# Patient Record
Sex: FEMALE | Race: ASIAN | NOT HISPANIC OR LATINO | Employment: UNEMPLOYED | URBAN - METROPOLITAN AREA
[De-identification: names, ages, dates, MRNs, and addresses within clinical notes are randomized per-mention and may not be internally consistent; named-entity substitution may affect disease eponyms.]

---

## 2022-10-13 NOTE — PROGRESS NOTES
"PRENATAL VISIT   FIRST OBSTETRICAL EXAM - OB   Nou is a new pt to the [facility] CNM's  , professional, among used for the visit    Assessment / Impression   First prenatal visit at 11w0d,  LMP 8/3/22, EDC 5/10/23  28yo    Last pap = never  BMI 26.21  EDPS = 030, \"never\" to #10    Plan:      -IOB labs drawn.  Including 1 hour GCT, hemoglobin electrophoresis, lead.  No GC chlamydia done today.  -Pt is a candidate for drawing lead level per [facility] screening tool--only because patient came from [facility] 2 months ago.   -Patient possibly interested in waterbirth.    -Reviewed prenatal care schedule.   -Optimal nutrition and weight gain discussed. Pregnancy weight gain of 15-25 lbs (BMI 25.0-29.9) encouraged.   -Anticipatory guidance for common pregnancy questions and concerns reviewed.   -Danger s/sx for this trimester reviewed with patient.   -Reviewed genetic screening options with patient, patient Does not elect for first trimester screening. The patient does not elect for quad screening.  Patient will check with her insurance to see what it will cover.  Patient originally said yes she would like the NIPS blood test--but when I mention that she is low risk, and that sometimes there is an out-of-pocket cost, encourage patient to check with her insurance first.  So she will do this and let us know in 1 week if she would like it drawn.  Gave written information regarding this test and she can check with her insurance company.  -Reviewed carrier testing options with patient, patient does not elect for testing or referral to genetic counseling.  -IOB packet given and reviewed with patient.   -CN services and hospital options reviewed; emergency and scheduling phone numbers given to patient.   -Because the patient does not have 1 high risk nor 2 or more moderate risk factors, low-dose aspirin not be initiated. (first preg, ask about SES NV)  -Antepartum VTE risk factors absent.  -Patient is at increased " risk for overt diabetes, so early 1hr GCT will be added to IOB labs today.  -Pt is not a candidate for an antepartum OB consult.    -Rx for all supplements recommended given including PNV, Slow Fe, vit D3, vit C, vit b 12  -Return to clinic 1 week for physical exam, GC chlamydia, Pap ?, check back about NIPS, SES?, safety check    Total time: 60 minutes spent on the date of the encounter doing chart review, review of test results, patient visit and documentation.     Subjective:   Sundar Weaver is a 26 year old G 1 P 0000 here today for her first obstetrical exam at 11 okay 1 SROM w0d. Here with  Bala. This pregnancy is planned. Sundar is a new immigrant--arrived in [place] from [facility] 2 months ago. The patient reports nausea, but no vomiting, fatigue and some mild breast tenderness. She has a certain 8/3/22, predicting an expected date of delivery of Estimated Date of Delivery: 5/10/22. Last period was normal. Her previous three cycles were normal. Her pregnancy is dated by LMP for now, but no FHT's so will defer to dating US if different.     The patient states that she is in a monogamous relationship and states that she is safe (but  always present). Offered GC/CT screening today and patient accepts at NV.  Current symptoms also include: breast tenderness, fatigue and frequent urination.     Risk factors: none Pregnancy Risk Factors:Less than 12th grade education, language barrier (needs [facility] )    The patient has the following high risk factors for preeclampsia:none. The patient has the following moderate risk factors for preeclampsia:first pregnancy. (check at NV for SES)    The patient has the following antepartum risk factors for VTE (two or more risk factors, or 1 * risk factor, places patient at higher risk): none.   Clinical history/risk factors requiring antepartum OB consult: none.     The patient has the following risk factors for overt diabetes: Body mass index greater than or equal to 23  "kg/m2in  Americans. Plus the additional risk factor(s) of: High-risk race/ethnicity (eg, , , ,  American, ).    Social History:   Education level: unknown (recent immigrant from [facility]  Occupation: [employer]   Partners name: Chor ()   ?   OB History   No obstetric history on file.        History:   No past medical history on file.   No past surgical history on file.   No family history on file.       No current outpatient medications on file.      Not on File     The patient's medical, surgical and family histories were reviewed     Pap smear: Last Pap: never had    EPDS score today: 0/30.\"never\" to #10   History of anxiety or depression: no    Review of Systems   General: Fatigue but otherwise denies problem   Eyes: Denies problem   Ears/Nose/Throat: Denies problem   Cardiovascular: Denies problem   Respiratory: Denies problem   Gastrointestinal: Nausea without vomiting, otherwise negative   Genitourinary: Denies any discharge, vaginal bleeding or itchiness or any other problem   Musculoskeletal: Breast tenderness otherwise denies problem   Skin: Denies problem   Neurologic: Denies problem   Psychiatric: Denies problem   Endocrine: Denies problem   Heme/Lymphatic: Denies problem   Allergic/Immunologic: Denies problem         Objective:   Objective  There were no vitals filed for this visit.     Physical Exam:   General Appearance: Alert, cooperative, no distress, appears stated age   HEENT: Normocephalic, without obvious abnormality, atraumatic. Conjunctiva/corneas clear  Lungs:  respirations unlabored   Heart: perfused normally  Breasts:deferred.   Abdomen: Gravid, soft, non-tender,  no masses.   FHT: NH  Pelvic: deferred  Musculoskeletal: Extremities normal, atraumatic, no cyanosis   Neurologic: Alert and oriented x 3. Normal speech   Lab: No results found for any visits on 10/19/22.        Addendum:    Order: [id number]   Status: Final result  "     Visible to patient: No (inaccessible in MyChart)      Dx: Normal first pregnancy confirmed, ant...      0 Result Notes  Details    Reading Physician Reading Date Result Priority   He Delcid MD  [phone] 10/19/2022      Narrative & Impression  EXAM: US OB < 14 WEEKS SINGLE-TRANSABDOMINAL  LOCATION: [facility]  DATE/TIME: 10/19/2022 5:29 PM     INDICATION: early US for dating, no FHT's in clinic  COMPARISON: None.  TECHNIQUE: Transabdominal scans were performed.      FINDINGS:  UTERUS: Single normal appearing intrauterine gestation sac.  CRL: Measures 3.4 cm, equals 10 weeks 3 days.  FETAL HEART RATE: 163 bpm.  AMNIOTIC FLUID: Normal.  PLACENTA: Not yet formed. Small subchorionic hemorrhage measuring 2.1 x 0.9 x 1 cm.     RIGHT OVARY: Normal.  LEFT OVARY: Small corpus luteum cyst.                                                                      IMPRESSION:   1.  Single living intrauterine gestation at 10 weeks 3 days, EDC 5/10/2023.  2.  Small subchorionic hemorrhage.

## 2022-10-19 ENCOUNTER — HOSPITAL ENCOUNTER (OUTPATIENT)
Dept: ULTRASOUND IMAGING | Facility: HOSPITAL | Age: 27
Discharge: HOME | End: 2022-10-19
Attending: MIDWIFE | Admitting: MIDWIFE
Payer: COMMERCIAL

## 2022-10-19 ENCOUNTER — PRENATAL OFFICE VISIT (OUTPATIENT)
Dept: MIDWIFE SERVICES | Facility: CLINIC | Age: 27
End: 2022-10-19
Payer: COMMERCIAL

## 2022-10-19 VITALS
BODY MASS INDEX: 25.95 KG/M2 | HEIGHT: 62 IN | OXYGEN SATURATION: 99 % | WEIGHT: 141 LBS | DIASTOLIC BLOOD PRESSURE: 62 MMHG | SYSTOLIC BLOOD PRESSURE: 108 MMHG | HEART RATE: 92 BPM

## 2022-10-19 DIAGNOSIS — Z34.00 NORMAL FIRST PREGNANCY CONFIRMED, ANTEPARTUM: ICD-10-CM

## 2022-10-19 DIAGNOSIS — Z34.00 SUPERVISION OF NORMAL FIRST PREGNANCY, ANTEPARTUM: ICD-10-CM

## 2022-10-19 DIAGNOSIS — Z34.00 NORMAL FIRST PREGNANCY CONFIRMED, ANTEPARTUM: Primary | ICD-10-CM

## 2022-10-19 LAB
ABO/RH(D): NORMAL
ANTIBODY SCREEN: NEGATIVE
ERYTHROCYTE [DISTWIDTH] IN BLOOD BY AUTOMATED COUNT: 12.9 % (ref 10–15)
GLUCOSE 1H P 50 G GLC PO SERPL-MCNC: 124 MG/DL (ref 70–129)
HCT VFR BLD AUTO: 34.3 % (ref 35–47)
HGB BLD-MCNC: 11.7 G/DL (ref 11.7–15.7)
MCH RBC QN AUTO: 29.2 PG (ref 26.5–33)
MCHC RBC AUTO-ENTMCNC: 34.1 G/DL (ref 31.5–36.5)
MCV RBC AUTO: 86 FL (ref 78–100)
PLATELET # BLD AUTO: 251 10E3/UL (ref 150–450)
RBC # BLD AUTO: 4.01 10E6/UL (ref 3.8–5.2)
SPECIMEN EXPIRATION DATE: NORMAL
SPECIMEN EXPIRATION DATE: NORMAL
WBC # BLD AUTO: 9.9 10E3/UL (ref 4–11)

## 2022-10-19 PROCEDURE — 82306 VITAMIN D 25 HYDROXY: CPT | Performed by: MIDWIFE

## 2022-10-19 PROCEDURE — 86850 RBC ANTIBODY SCREEN: CPT | Performed by: MIDWIFE

## 2022-10-19 PROCEDURE — 86803 HEPATITIS C AB TEST: CPT | Performed by: MIDWIFE

## 2022-10-19 PROCEDURE — 85660 RBC SICKLE CELL TEST: CPT | Mod: 90 | Performed by: MIDWIFE

## 2022-10-19 PROCEDURE — 86762 RUBELLA ANTIBODY: CPT | Performed by: MIDWIFE

## 2022-10-19 PROCEDURE — 83021 HEMOGLOBIN CHROMOTOGRAPHY: CPT | Mod: 90 | Performed by: MIDWIFE

## 2022-10-19 PROCEDURE — 87340 HEPATITIS B SURFACE AG IA: CPT | Performed by: MIDWIFE

## 2022-10-19 PROCEDURE — 86900 BLOOD TYPING SEROLOGIC ABO: CPT | Performed by: MIDWIFE

## 2022-10-19 PROCEDURE — 85027 COMPLETE CBC AUTOMATED: CPT | Performed by: MIDWIFE

## 2022-10-19 PROCEDURE — 99205 OFFICE O/P NEW HI 60 MIN: CPT | Performed by: MIDWIFE

## 2022-10-19 PROCEDURE — 83655 ASSAY OF LEAD: CPT | Mod: 90 | Performed by: MIDWIFE

## 2022-10-19 PROCEDURE — 99000 SPECIMEN HANDLING OFFICE-LAB: CPT | Performed by: MIDWIFE

## 2022-10-19 PROCEDURE — 86901 BLOOD TYPING SEROLOGIC RH(D): CPT | Performed by: MIDWIFE

## 2022-10-19 PROCEDURE — 87389 HIV-1 AG W/HIV-1&-2 AB AG IA: CPT | Performed by: MIDWIFE

## 2022-10-19 PROCEDURE — 87086 URINE CULTURE/COLONY COUNT: CPT | Performed by: MIDWIFE

## 2022-10-19 PROCEDURE — 86780 TREPONEMA PALLIDUM: CPT | Performed by: MIDWIFE

## 2022-10-19 PROCEDURE — 36415 COLL VENOUS BLD VENIPUNCTURE: CPT | Performed by: MIDWIFE

## 2022-10-19 PROCEDURE — 76801 OB US < 14 WKS SINGLE FETUS: CPT

## 2022-10-19 PROCEDURE — 2894A VOIDCORRECT: CPT | Performed by: MIDWIFE

## 2022-10-19 PROCEDURE — 82950 GLUCOSE TEST: CPT | Performed by: MIDWIFE

## 2022-10-19 PROCEDURE — 83020 HEMOGLOBIN ELECTROPHORESIS: CPT | Mod: 90 | Performed by: MIDWIFE

## 2022-10-19 RX ORDER — CHOLECALCIFEROL (VITAMIN D3) 50 MCG
2 TABLET ORAL DAILY
Qty: 60 TABLET | Refills: 1 | Status: SHIPPED | OUTPATIENT
Start: 2022-10-19 | End: 2024-09-17

## 2022-10-19 RX ORDER — PRENATAL VIT/IRON FUM/FOLIC AC 27MG-0.8MG
1 TABLET ORAL DAILY
Qty: 90 TABLET | Refills: 3 | Status: SHIPPED | OUTPATIENT
Start: 2022-10-19 | End: 2024-09-17

## 2022-10-19 RX ORDER — MULTIVIT WITH MINERALS/LUTEIN
250 TABLET ORAL DAILY
Qty: 60 TABLET | Refills: 1 | Status: SHIPPED | OUTPATIENT
Start: 2022-10-19 | End: 2024-09-17

## 2022-10-20 LAB
DEPRECATED CALCIDIOL+CALCIFEROL SERPL-MC: 17 UG/L (ref 20–75)
HBV SURFACE AG SERPL QL IA: NONREACTIVE
HCV AB SERPL QL IA: NONREACTIVE
HIV 1+2 AB+HIV1 P24 AG SERPL QL IA: NONREACTIVE
RUBV IGG SERPL QL IA: 18.7 INDEX
RUBV IGG SERPL QL IA: POSITIVE
T PALLIDUM AB SER QL: NONREACTIVE

## 2022-10-21 LAB
BACTERIA UR CULT: NO GROWTH
HGB A1 MFR BLD: 96.2 %
HGB A2 MFR BLD: 3 %
HGB C MFR BLD: 0 %
HGB E MFR BLD: 0 %
HGB F MFR BLD: 0.8 %
HGB FRACT BLD ELPH-IMP: NORMAL
HGB OTHER MFR BLD: 0 %
HGB S BLD QL SOLY: NORMAL
HGB S MFR BLD: 0 %
PATH INTERP BLD-IMP: NORMAL

## 2022-10-22 LAB — LEAD BLDV-MCNC: <2 UG/DL

## 2022-10-26 ENCOUNTER — PRENATAL OFFICE VISIT (OUTPATIENT)
Dept: MIDWIFE SERVICES | Facility: CLINIC | Age: 27
End: 2022-10-26
Payer: COMMERCIAL

## 2022-10-26 VITALS
DIASTOLIC BLOOD PRESSURE: 58 MMHG | SYSTOLIC BLOOD PRESSURE: 104 MMHG | BODY MASS INDEX: 26.13 KG/M2 | HEART RATE: 89 BPM | OXYGEN SATURATION: 99 % | WEIGHT: 142 LBS | HEIGHT: 62 IN

## 2022-10-26 DIAGNOSIS — Z34.00 NORMAL FIRST PREGNANCY CONFIRMED, ANTEPARTUM: Primary | ICD-10-CM

## 2022-10-26 LAB
CLUE CELLS: ABNORMAL
TRICHOMONAS, WET PREP: ABNORMAL
WBC'S/HIGH POWER FIELD, WET PREP: ABNORMAL
YEAST, WET PREP: ABNORMAL

## 2022-10-26 PROCEDURE — G0145 SCR C/V CYTO,THINLAYER,RESCR: HCPCS | Performed by: MIDWIFE

## 2022-10-26 PROCEDURE — 36415 COLL VENOUS BLD VENIPUNCTURE: CPT | Performed by: MIDWIFE

## 2022-10-26 PROCEDURE — 87591 N.GONORRHOEAE DNA AMP PROB: CPT | Performed by: MIDWIFE

## 2022-10-26 PROCEDURE — 99213 OFFICE O/P EST LOW 20 MIN: CPT | Performed by: MIDWIFE

## 2022-10-26 PROCEDURE — 87491 CHLMYD TRACH DNA AMP PROBE: CPT | Performed by: MIDWIFE

## 2022-10-26 PROCEDURE — 87210 SMEAR WET MOUNT SALINE/INK: CPT | Performed by: MIDWIFE

## 2022-10-26 NOTE — PROGRESS NOTES
Sundar presents to [facility] clinic alone for a routine prenatal visit follow up IOB visit at 11w3d.   physical exam, GC chlamydia, Pap, SES--no financial concerns.  Pt and  choosing NIPT today--knows they could have an out of pocket cost, they want to know sex of baby   safety check NV ( is here)  Feeling well.  Reviewed IOB labs, dating ultrasound.   Disc option for genetic screening: NIPS and 20 wk US desired only  Pregnancy discomforts include: not much N/V (only when hungry throws up)  Sleeping: yes  Fetal movement: not yet    Physical Exam:   General Appearance: Alert, cooperative, no distress, appears stated age   HEENT: Normocephalic, without obvious abnormality, atraumatic. Conjunctiva/corneas clear  Neck: Supple, symmetrical, trachea midline, no adenopathy.   Thyroid: not enlarged, symmetric, no tenderness/mass/nodules   Back: Symmetric, no curvature, ROM normal, no CVA tenderness   Lungs: Clear to auscultation bilaterally, respirations unlabored   Heart: Regular rate and rhythm, S1 and S2 normal, no murmur. No edema to lower extremities.   Breasts: No breast masses, tenderness, asymmetry, or nipple discharge. Nipples are everted.   Abdomen: Gravid, soft, non-tender, bowel sounds active all four quadrants, no masses.   FHT: 160   Vulva:  no sign of lesions or condyloma, normal hair distribution   Vagina: pink, normal rugae, no abnormal discharge   Cervix:  long/thick/closed, no lesions or inflammation noted, negative CMT with exam   Uterus: mid position, non tender, enlarged approximately 12 weeks size   Adnexa:  no masses appreciated, non-tender with palpation   Musculoskeletal: Extremities normal, atraumatic, no cyanosis   Skin: Skin color, texture, turgor normal, no rashes or lesions   Lymph nodes: Cervical, supraclavicular, and axillary nodes normal   Neurologic: Alert and oriented x 3. Normal speech       Reviewed our recommendation that she take an iron supplement daily to boost her iron stores  prior to birth   Pt is currently supplementing daily; discussed strategies to manage constipation.     Second trimester pregnancy anticipatory guidance reviewed.     Enc follow-up in 4 weeks or sooner prn. NV safety check and 20 wk US ordered

## 2022-10-27 LAB
C TRACH DNA SPEC QL PROBE+SIG AMP: NEGATIVE
N GONORRHOEA DNA SPEC QL NAA+PROBE: NEGATIVE

## 2022-10-31 LAB
BKR LAB AP GYN ADEQUACY: NORMAL
BKR LAB AP GYN INTERPRETATION: NORMAL
BKR LAB AP HPV REFLEX: NORMAL
BKR LAB AP LMP: NORMAL
BKR LAB AP PREVIOUS ABNORMAL: NORMAL
PATH REPORT.COMMENTS IMP SPEC: NORMAL
PATH REPORT.COMMENTS IMP SPEC: NORMAL
PATH REPORT.RELEVANT HX SPEC: NORMAL

## 2022-11-01 PROBLEM — Z13.79 GENETIC SCREENING: Status: ACTIVE | Noted: 2022-11-01

## 2022-11-01 LAB — SCANNED LAB RESULT: NORMAL

## 2022-11-09 DIAGNOSIS — Z34.02 ENCOUNTER FOR SUPERVISION OF NORMAL FIRST PREGNANCY IN SECOND TRIMESTER: Primary | ICD-10-CM

## 2022-11-22 NOTE — PROGRESS NOTES
Sundar presents to [facility] clinic for a routine prenatal visit at 15w3d. Juan  via phone.  Ordered 20 wk US    Feeling fine--some nausea and minimal vomiting still (not more than once a day).   Disc option for genetic screening: NIP (done,normal boy) and US only, declines AFP   Looked at wt gain chart--meets recommendation, doing well  Anatomy scan ordered.  Pregnancy discomforts include: some mild nausea and vomiting but improved  Sleeping: yes  Fetal movement: not yet  Needs varicella at next blood draw    Reviewed our recommendation that she take an iron supplement daily to boost her iron stores prior to birth   Pt is currently supplementing daily; discussed strategies to manage constipation.     Second trimester pregnancy anticipatory guidance reviewed.     Enc follow-up in 4 weeks or sooner prn.

## 2022-11-23 ENCOUNTER — PRENATAL OFFICE VISIT (OUTPATIENT)
Dept: MIDWIFE SERVICES | Facility: CLINIC | Age: 27
End: 2022-11-23

## 2022-11-23 VITALS
DIASTOLIC BLOOD PRESSURE: 64 MMHG | WEIGHT: 146 LBS | HEART RATE: 68 BPM | HEIGHT: 62 IN | BODY MASS INDEX: 26.87 KG/M2 | SYSTOLIC BLOOD PRESSURE: 106 MMHG

## 2022-11-23 DIAGNOSIS — Z34.00 SUPERVISION OF NORMAL FIRST PREGNANCY, ANTEPARTUM: Primary | ICD-10-CM

## 2022-11-23 PROCEDURE — 99207 PR PRENATAL VISIT: CPT | Performed by: MIDWIFE

## 2023-02-01 ENCOUNTER — TELEPHONE (OUTPATIENT)
Dept: MIDWIFE SERVICES | Facility: CLINIC | Age: 28
End: 2023-02-01

## 2023-02-01 NOTE — TELEPHONE ENCOUNTER
Last visit with CNM was 11/23/22 at 15.3 weeks of pregnancy.  Message routed to on-call CNM for review.

## 2023-02-09 ENCOUNTER — MEDICAL CORRESPONDENCE (OUTPATIENT)
Dept: HEALTH INFORMATION MANAGEMENT | Facility: CLINIC | Age: 28
End: 2023-02-09

## 2023-02-13 NOTE — PROGRESS NOTES
Sundar presents to [facility] clinic for a routine prenatal visit at 27w3d.Juan  ***  Disc lapse in care ***. Stressed the importance of regular PNC. Utox done today.   Feeling ***.  Here for her 1 hr. GCT, hgb, RPR today. Accepts Tdap for fetal protection of pertussis. ***  Reports normal fetal movements. Discussed [facility]. Denies PTL including, regular painful contractions, bleeding, leaking or changes in fetal movement.   Reviewed  labor precautions, warning signs and when/how to call the on-call CNM.   Offers no questions or concerns.   Questions about: ***.     Reviewed our recommendation that she take an iron supplement daily to boost her iron stores prior to birth   Pt. is currently supplementing *** daily; discussed strategies to manage constipation.     NV: plan RTC in 4 wks if labs normal

## 2023-02-15 ENCOUNTER — PRENATAL OFFICE VISIT (OUTPATIENT)
Dept: MIDWIFE SERVICES | Facility: CLINIC | Age: 28
End: 2023-02-15

## 2023-02-15 VITALS
WEIGHT: 162 LBS | HEART RATE: 72 BPM | DIASTOLIC BLOOD PRESSURE: 80 MMHG | SYSTOLIC BLOOD PRESSURE: 106 MMHG | BODY MASS INDEX: 30.11 KG/M2

## 2023-02-15 DIAGNOSIS — Z34.00 SUPERVISION OF NORMAL FIRST PREGNANCY, ANTEPARTUM: Primary | ICD-10-CM

## 2023-02-15 DIAGNOSIS — O09.33 INSUFFICIENT PRENATAL CARE IN THIRD TRIMESTER: ICD-10-CM

## 2023-02-15 DIAGNOSIS — Z75.8 LANGUAGE BARRIER: ICD-10-CM

## 2023-02-15 DIAGNOSIS — Z13.79 GENETIC SCREENING: ICD-10-CM

## 2023-02-15 DIAGNOSIS — Z60.3 LANGUAGE BARRIER: ICD-10-CM

## 2023-02-15 DIAGNOSIS — Z34.02 ENCOUNTER FOR SUPERVISION OF NORMAL FIRST PREGNANCY IN SECOND TRIMESTER: ICD-10-CM

## 2023-02-15 LAB
AMPHETAMINES UR QL SCN: NORMAL
BARBITURATES UR QL SCN: NORMAL
BENZODIAZ UR QL SCN: NORMAL
BZE UR QL SCN: NORMAL
CANNABINOIDS UR QL SCN: NORMAL
CREAT UR-MCNC: 119 MG/DL
GLUCOSE 1H P 50 G GLC PO SERPL-MCNC: 90 MG/DL (ref 70–129)
HGB BLD-MCNC: 11.8 G/DL (ref 11.7–15.7)
HOLD SPECIMEN: NORMAL
OPIATES UR QL SCN: NORMAL
OXYCODONE UR QL: NORMAL
PCP QUAL URINE (ROCHE): NORMAL

## 2023-02-15 PROCEDURE — 99207 PR PRENATAL VISIT: CPT | Performed by: ADVANCED PRACTICE MIDWIFE

## 2023-02-15 PROCEDURE — 36415 COLL VENOUS BLD VENIPUNCTURE: CPT | Performed by: ADVANCED PRACTICE MIDWIFE

## 2023-02-15 PROCEDURE — 90471 IMMUNIZATION ADMIN: CPT | Performed by: ADVANCED PRACTICE MIDWIFE

## 2023-02-15 PROCEDURE — 80307 DRUG TEST PRSMV CHEM ANLYZR: CPT | Performed by: ADVANCED PRACTICE MIDWIFE

## 2023-02-15 PROCEDURE — 86780 TREPONEMA PALLIDUM: CPT | Performed by: ADVANCED PRACTICE MIDWIFE

## 2023-02-15 PROCEDURE — 82950 GLUCOSE TEST: CPT | Performed by: ADVANCED PRACTICE MIDWIFE

## 2023-02-15 PROCEDURE — 85018 HEMOGLOBIN: CPT | Performed by: ADVANCED PRACTICE MIDWIFE

## 2023-02-15 PROCEDURE — 90715 TDAP VACCINE 7 YRS/> IM: CPT | Performed by: ADVANCED PRACTICE MIDWIFE

## 2023-02-15 PROCEDURE — 86787 VARICELLA-ZOSTER ANTIBODY: CPT | Performed by: ADVANCED PRACTICE MIDWIFE

## 2023-02-15 SDOH — SOCIAL STABILITY - SOCIAL INSECURITY: ACCULTURATION DIFFICULTY: Z60.3

## 2023-02-15 NOTE — PROGRESS NOTES
Paulineu presents with her  today.  Her nausea and vomiting of pregnancy has resolved!  Her baby is active, and she denies headache, visual disturbances, right upper quadrant abdominal pain, regular uterine contractions, loss of fluid or vaginal bleeding.  12-week lapse in prenatal care discussed; patient states she did not come to her prenatal appointments due to winter weather/snow.  This writer described the importance of regular and consistent prenatal care within our midwifery practice, and patient and  both have verbal understanding of and agree with returning for prenatal care consistently for the remainder of the pregnancy.  She is agreeable to UDS today.  20-week fetal anatomy ultrasound ordered and scheduled for ..  Serum labs: 1 hour GCT, hemoglobin and RPR.  She is excepting of Tdap and understands the  benefits.  She DECLINES COVID-19 and influenza vaccines today, but may be willing next visit.  Written information reviewed regarding 1 hour GCT GDM screening test, vaccines and pregnancy and  information.   labor precautions and danger signs and symptoms reviewed.  All questions answered.  Encouraged daily fetal movement counting and to call or return to clinic with any questions, concerns, or as needed.

## 2023-02-16 LAB
T PALLIDUM AB SER QL: NONREACTIVE
VZV IGG SER QL IA: 1439 INDEX
VZV IGG SER QL IA: POSITIVE

## 2023-02-17 ENCOUNTER — DOCUMENTATION ONLY (OUTPATIENT)
Dept: MIDWIFE SERVICES | Facility: CLINIC | Age: 28
End: 2023-02-17

## 2023-02-17 ENCOUNTER — HOSPITAL ENCOUNTER (OUTPATIENT)
Dept: ULTRASOUND IMAGING | Facility: HOSPITAL | Age: 28
Discharge: HOME | End: 2023-02-17
Attending: ADVANCED PRACTICE MIDWIFE | Admitting: ADVANCED PRACTICE MIDWIFE

## 2023-02-17 DIAGNOSIS — Z34.00 SUPERVISION OF NORMAL FIRST PREGNANCY, ANTEPARTUM: ICD-10-CM

## 2023-02-17 PROCEDURE — 76805 OB US >/= 14 WKS SNGL FETUS: CPT

## 2023-03-08 ENCOUNTER — PRENATAL OFFICE VISIT (OUTPATIENT)
Dept: MIDWIFE SERVICES | Facility: CLINIC | Age: 28
End: 2023-03-08

## 2023-03-08 VITALS
DIASTOLIC BLOOD PRESSURE: 64 MMHG | BODY MASS INDEX: 30.36 KG/M2 | WEIGHT: 165 LBS | OXYGEN SATURATION: 97 % | HEART RATE: 78 BPM | SYSTOLIC BLOOD PRESSURE: 106 MMHG | HEIGHT: 62 IN

## 2023-03-08 DIAGNOSIS — Z75.8 LANGUAGE BARRIER: ICD-10-CM

## 2023-03-08 DIAGNOSIS — Z34.00 SUPERVISION OF NORMAL FIRST PREGNANCY, ANTEPARTUM: Primary | ICD-10-CM

## 2023-03-08 DIAGNOSIS — O09.33 INSUFFICIENT PRENATAL CARE IN THIRD TRIMESTER: ICD-10-CM

## 2023-03-08 DIAGNOSIS — O26.03 EXCESSIVE WEIGHT GAIN DURING PREGNANCY IN THIRD TRIMESTER: ICD-10-CM

## 2023-03-08 DIAGNOSIS — Z60.3 LANGUAGE BARRIER: ICD-10-CM

## 2023-03-08 PROCEDURE — 99207 PR PRENATAL VISIT: CPT | Performed by: ADVANCED PRACTICE MIDWIFE

## 2023-03-08 SDOH — SOCIAL STABILITY - SOCIAL INSECURITY: ACCULTURATION DIFFICULTY: Z60.3

## 2023-03-08 NOTE — PROGRESS NOTES
Jean Paul presents with FOB Chor, and this visit is conducted with the assistance of a professional [employer] telephone .  Patient states all is well!  20-week fetal anatomy ultrasound results reviewed and WNL.  Baby is active, and she denies regular uterine contractions, loss of fluid or vaginal bleeding.  Lab results reviewed from last visit: UDS all negative, 1 hour GCT 98 mg/dL, RPR nonreactive, hemoglobin 11.8g/dL.  Patient continues to take a prenatal vitamin daily AND other supplements such as oral iron, B12, vitamin C and vitamin D3.  Next visit: Please review 32-week checklist and CNM details under pregnancy diagnosis.   labor precautions and danger signs and symptoms reviewed.  All questions answered.  Encouraged daily fetal movement counting and to call or return to clinic with any questions, concerns, or as needed.

## 2023-03-10 ENCOUNTER — APPOINTMENT (OUTPATIENT)
Dept: INTERPRETER SERVICES | Facility: CLINIC | Age: 28
End: 2023-03-10

## 2023-03-22 ENCOUNTER — PRENATAL OFFICE VISIT (OUTPATIENT)
Dept: MIDWIFE SERVICES | Facility: CLINIC | Age: 28
End: 2023-03-22

## 2023-03-22 VITALS — SYSTOLIC BLOOD PRESSURE: 110 MMHG | BODY MASS INDEX: 31.23 KG/M2 | WEIGHT: 168 LBS | DIASTOLIC BLOOD PRESSURE: 64 MMHG

## 2023-03-22 DIAGNOSIS — Z34.00 SUPERVISION OF NORMAL FIRST PREGNANCY, ANTEPARTUM: Primary | ICD-10-CM

## 2023-03-22 PROCEDURE — 99207 PR PRENATAL VISIT: CPT | Performed by: ADVANCED PRACTICE MIDWIFE

## 2023-03-22 NOTE — PROGRESS NOTES
Jean Paul presents with Chor for a routine PNV at 32w 3d. [employer]  utilized via telephone for entire visit. Feeling overall fine.  Discussed:  1.) Verified date of birth with Nou.  Clarified IS December 3rd, 1995.  2.) Updated/ filled in CNM details in problem list.  3.) Assisted with sign up for my chart.    Reviewed maternal growth chart.    More than recommended weight gain.  Reviewed to limit sugars, salt, meal portion size while continuing healthy snacking throughout day, and focusing on exercise - outside if possible.  RTC 2 weeks, sooner as needed.   Has CNM numbers and aware to call with DFM, LOF, PTL, s/s of PEC, or any questions or concerns.    SARAH Laguerre CNM    3/22/2023  10:27 AM

## 2023-03-22 NOTE — PATIENT INSTRUCTIONS
"\"We hope you had a positive experience and that you can definitely recommend [facility] to your family and friends. You ll be receiving a survey soon and we look forward to hearing your feedback\".    [facility]  - Contact information:  Appointment line and to get a hold of CNM in clinic Monday-Friday 8 am - 5 pm:  [phone].  There are some clinics with early start times (1st appointment 7:40 am) and others with evening hours (last appointment 6:20 pm).  Most are typically open from 8 am to 5 pm.    CNM on call answering service: [phone].  Specify your hospital of choice and leave a brief message for CNM;  will then page CNM who is on call at your specified hospital and you should receive a call back with 15 minutes.  Be sure that your ringer is audible and that you can accept blocked calls so that we can get back in touch with you! This number should be reserved for urgent needs if during the day, before 8 am, after 5 pm, weekends, holidays.    Contact the on-call CNM with warning signs, such as:  vaginal bleeding   Vaginal discharge and itching or pain and burning during urination  Leg/calf pain or swelling on one side  severe abdominal pain  nausea and vomiting more than 4-5 times a day, or if you are unable to keep anything down  fever more than 100.4 degrees F.     [employer]  After each of your visits you are welcome to check Actimagine for your visit summary including education and links to information relevant to your pregnancy and/or well woman care.   Find the \"Visits\" tab at the top of the page, you will see a list of recent visits and for each visit a for link for \"View After Visit Summary.\" View of your After Visit Summary will allow you to read our recommendations from your visit, review any education provided, and link to websites with useful information.   If you have any questions or difficulty navigating TheCrowd, please feel free to " "contact us and we will do our best to direct you.  Meet the Midwives from [facility]  You are invited to an informational meet and greet with [facility] Certified Nurse-Midwives. Our free \"Meet the Midwives\" event is a great opportunity to learn about our midwives' philosophy and experience, the hospitals where we can assist with your birth, and answer questions you may have. Partners, friends, and family are welcome to attend. Currently, this is a virtual event.  Date  Last Thursday of every month at 7 pm.    Link to next (live) meeting  [email or web address]/classes-[email or web address]-the-midwives-from-[facility]  To Join by Telephone (audio only) Call:   [phone] Phone Conference ID: [phone]#      Touring the Maternity Care Center  At this time we are offering a virtual tour of the Maternity Care Centers at both [facility] and [facility]:   [facility] Maternity Care:   [email or web address]/[email or web address]:   [email or web address].[email or web address]/locations/[facility]      Breastfeeding and Birth Control  How do I decide what birth control method is best for me while I am breastfeeding?  Choosing a method of birth control is very personal. First, answer the following questions:     Do you want to have more children?   How much spacing between births do you want for your children?   Do you smoke or have you had any health problems, such as liver disease or a blood clot?  Talk about the answers to each of these questions with your health care provider to help you choose the best method for you.    Can I use breastfeeding as my birth control?  Using breastfeeding as your birth control (the lactational amenorrhea method) can be a good way to keep from getting pregnant in the first months after the baby is born. Each time " your baby nurses, your body releases a hormone called prolactin, which stops your body from making the hormones that cause you to ovulate (release an egg). If you are not ovulating, you cannot get pregnant.    The lactational amenorrhea method works only if:   you have not started your period yet.   you are breastfeeding only and not giving your baby any other food or drink.   you are breastfeeding at least every 4 hours during the day and every 6 hours at night.   your baby is less than 6 months old.  When any 1 of these 4 things is not happening, you no longer have good protection from getting pregnant, and you should use another form of birth control.    What birth control methods are safe for me to use while I breastfeed?    Methods without hormones  Methods without hormones do not affect you, your baby, or your breastfeeding.  Methods without hormones that are the most effective   The copper intrauterine contraceptive device (IUD) (ParaGard) is a small, T-shaped device that is in- serted into your uterus (womb) through the vagina and cervix. The copper IUD lasts for 10 years.   Sterilization (getting your tubes tied or your partner having a vasectomy) is very effective, but it is per- manent. You should choose sterilization only if you do not want to have more children.  A method without hormones that is effective   The lactational amenorrhea method described above is effective for the first 6 months.  Methods without hormones that are less effective   Natural family planning is monitoring your body for signs of ovulation and not having sex when you think you are ovulating. This method is reliable only if you are having regular periods every month.   Barrier methods (condoms, diaphragms, sponges, and spermicides) are used at the time you have sex. These methods are effective only if you use them correctly every time.    Methods with hormones  Birth control methods that use hormones can be used while you are  breastfeeding. They may have a small effect on lowering the amount of milk you make. All hormones will get into your breast milk in very small amounts, but there is no known harm to your baby from this small amount of hormone in breast milk.only methodsmethods use only 1 hormone, called progestin. You can start them right after your baby is born or wait 4 to 6 weeks to make sure your milk supply is good.   Progestin-only pills ( minipills ): If you like to take pills every day, you can use the minipill. In order forpill to work well, you have to take 1 at the same time each day. When you stop breastfeeding, you should start pills that have both estrogen and progestin because they are better at keeping you from get- ting pregnant.   Progestin IUD (Mirena): The progestin IUD is shaped and inserted into the uterus like the copper IUD. It works for up to 5 years. Both IUDs are usually inserted 4 to 6 weeks after the baby is born.  Progestin implant (Nexplanon): The progestin implant is a small matchstick-sized flexible shyann. It is placed into the fatty tissue in the back of your arm. It works for up to 3 years.  Progestin shot (Depo-Provera): The progestin shot is given every 3 months.    estrogen and progestin methods  These methods use 2 hormones, called estrogen and progestin.     These methods increase your risk of a blood clot, which is already higher than normal after you have a baby. You should not use them until your baby is at least 6 weeks old. The combined methods are not recommended as the first choice for women who are breastfeeding. If a combined method is the one that you feel will be best for you to prevent getting pregnant, these methods are okay to use while breastfeeding.   Combined birth control pills: You take a pill each day.  Vaginal ring (NuvaRing): The ring is worn in the vagina for 3 weeks then left out for 1 week before youin a new ring.  Patch (Ortho Evra): The patch is placed on your skin and  changed every week for 3 weeks then left off forweek before putting a new patch on a different area of your skin.    Pediatric Care Providers at [facility]:    Choosing the right provider is one of the most important decisions you ll make about your health care. We can help you find the right one. Remember, you re looking for a provider you can trust and work with to improve your health and well-being, so take time to think about what you need. Depending on how complicated your health care needs are, you may need to see more than one type of provider.    Primary Care Providers: You ll see a primary care provider first for most health issues. They ll work with you to get your recommended screenings, help you manage chronic conditions, and refer you to other types of providers if you need them. Your primary care provider may be called a family physician or doctor, internist, general practitioner, nurse practitioner, or physician s assistant. Your child or teenager s provider may be called a pediatrician.  Specialists: You ll see a specialist for certain services or to treat specific conditions. Specialists include cardiologists, oncologists, psychologists, allergists, podiatrists, and orthopedists. You may need a referral from your primary care provider before you go to a specialist in order for your health plan to pay for your visit.\michelledHere are some tips for finding a provider where you live:  If you already have a provider you like and want to keep working with, call their office and ask if they accept your coverage.  Call your insurance company or state Medicaid and CHIP program. Look at their website or check your member handbook to find providers in your network who take your health coverage.  Ask your friends or family if they have providers they like and use these tools to compare health care providers in your area.    Family Medicine at  [facility]:     [email or web address]-medicine    Many of our families enjoy all seeing the same doctor, who comes to know the whole family very well. We base our practice on the knowledge of the patient in the context of family and community.  WHY CHOOSE A FAMILY MEDICINE PHYSICIAN?  Ability of the whole family to see the same doctor  Focus on the whole person, including physical and emotional health  A personal relationship with their doctor that is nurtured over time  Respect for individual and family beliefs and values  No need to change primary care providers when a certain age is reached  Coordination of care when other health care services are needed    Pediatrics at  [facility]:   [email or web address]-care    Through a teaching affiliation with the [facility], [facility] staff keeps current on new developments in the field of pediatrics.     Everything we do centers around caring for children. We place special emphasis on wellness and prevention.pediatric care team includes a team of pediatricians and certified nurse practitioners who provide care to pediatric and adolescent patients ages 0 to 18, and some up to the age of 26. We offer preventive health maintenance for healthy children as well the diagnosis and treatment of common and chronic illnesses and injuries. In addition, we also offer several pediatric specialists who focus on adolescent health issues and developmental and behavioral issues.    Circumcision    Educational video:     [email or web address]    What is circumcision?  At birth, baby boys have loose skin that covers the head of the penis. This skin is called the foreskin. When all or part of the foreskin of the penis is cut off, this is called circumcision.  Why is circumcision done?  Circumcision is done for many reasons including Judaism,  cultural, looks, and health. Some Faith groups circumcise all boys as a kushal-based practice. Many people in the [place] choose to circumcise their baby boys because they believe it is culturally normal. It is not a common practice in [place], [place], or [place].  Some parents choose circumcision so that their son will have a penis that looks like his father s if the father was also circumcised. Other people choose circumcision because they believe it is  or will protect the boy or man from infection or cancer later in life.  Does circumcision protect against infection or cancer?  Circumcision does seem to protect against some types of infection or cancer. Cancer of the penis is one type of cancer that circumcision may prevent. However, cancer of the penis is very rare. One hundred thousand circumcisions would need to be done to prevent one case of cancer of the penis. Circumcision may also decrease the chance of some sexually transmitted infections, such as HIV and human papilloma virus (HPV). See the next page for more information on the risks and benefits of circumcision.  What happens during a circumcision?  Babies born in the hospital are usually circumcised before they go home. Health care providers also perform circumcisions in their offices and clinics within a few weeks after birth.  Temple circumcisions are most often done at home or in a Judaism.  Before the circumcision is performed, some providers give an injection (shot) of a small amount of anesthetic (numbing medicine) at the base of the penis to block the pain or put an anesthetic cream on the penis to numb the area that will be cut.  There are 2 different ways to do a circumcision. In one type, a clamp placed around the head of the penis cuts off the blood supply to the foreskin, and the foreskin above the clamp is cut off. The clamp is left on the penis until the area heals and it falls off a few days later. In another  type of circumcision, the foreskin is cut off with scissors or a scalpel.  After the circumcision, petroleum jelly and sometimes gauze may be put over the area of the penis where the skin was removed. This protects the end of the penis while it heals.  Can I keep my son s penis  if it is circumcised?  Regular washing with soap and water will keep any penis clean. Circumcision does not make the penis . Uncircumcised boys do need to be taught to clean beneath their foreskin, just like they need to be taught to wash their hands or brush their teeth.  How do I decide if I should have my son circumcised?  The American Academy of Pediatrics (AAP) says that  circumcision may have health benefits. They do not recommend circumcision for all boys as a routine procedure. The AAP recommends that you talk to your health care provider to decide if circumcision is the right choice for your family. You may also wish to discuss the question with your family or .  What are the risks and benefits of circumcision?  We do not have a lot of good scientific information about the health risks or benefits of circumcision.  Possible Risks:  Very few baby boys (less than 1 in 100) will have a problem after circumcision, such as bleeding or mild infection of the penis. These problems are usually not serious and are easy to treat.  Less common problems are:   Removal of too much or too little foreskin  Some rare problems are:   Narrowing of the opening of the penis, which can cause problems with urination   Removal of part of the penis or death of some of the other skin on the penis  Infection that spreads to other parts of the body  People used to think babies did not really feel pain. Now we know that they do. Many baby boys appear to feel a lot of pain during circumcision if anesthesia is not used.  We do not know if circumcision affects sexual function or sensation.  Possible Benefits:   Less risk for  some kinds of cancers, like cancer of the penis   Fewer urinary tract (bladder or kidney) infections for babies   Less risk for some sexually transmitted infections, such as HIV, herpes, and HPV    May protect female sexual partners from some sexually transmitted infections    For More Information  American Academy of Family Physicians: Circumcision  http://familydoctor.org/familydoctor/en/pregnancy-newborns/caring-for-newborns/infant- care/circumcision.html  MedlinePlus: Circumcision (includes a slide show on the procedure)  www.nlm.nih.gov/medlineplus/circumcision.html  American Academy of Pediatrics: Policy statement on circumcision  Http://pediatrics.aappublications.org/content/130/3/e756.abstract      Childbirth and Parenting Education:         Everyday Miracles:   https://www.everyday-miracles.org/    Free Video Series from [facility]: https://nursing.[facility]/academics/specialty-areas/nurse-midwifery/having-baby-prenatal-videos/having-baby-prenatal-and    MARY ALICE parenting center: http://[facility] Butter[facility] HospitalFuze/   [phone]  Blooma: (education, yoga & wellness) www.Diabetica.Karma Snap  Enlightened Mama: www.enlightenedmama.Karma Snap   Childbirth collective: (Parent topic nights)  www.childbirthcollective.org/  Hypnobabies:  www.hypnobabiestwincities.com/  Hypnobirthing:  Http://hypnobirthing.com/  The Birth Hour: https://theFoxtrot.Karma Snap/online-childbirth-class/    APPS and Podcasts:   Audra Hood Nurture    Evidence Based Birth  The Birth Hour (for birth stories)   Birthful   Expectful   The Longest Shortest Time  PregnancyPodcast Maira Ryan    Book Recommendations:   Cheryl Hogansburg's Birthing From Within--first few chapters include a new-age tone, you may prefer to skip it and keep going, because there is good stuff later.  This book recommendation covers emotional preparation, but does cover coping with pain, and use of both pharmacological and nonpharmacological methods.    Dr. Feliciano' The Pregnancy Book  "and The Birth Book--the pregnancy book goes month-by month      The Birth Partner by Yissel Moralez    Womanly Art of Breastfeeding by La Leche League International   Bestfeeding by Chasity Warren--great pictures    Mothering Your Nursing Toddler, by Stacey Ernandez.   Addresses dealing with so many of the challenging behaviors of a nursing toddler.  How Weaning Happens, by La Leche League.  Discusses weaning at all ages, from medically necessary weaning of an infant, all the way up to age 5 (or older), with why/why not, and strategies.  Very empowering book both for deciding to wean and deciding not to.    American College of Nurse-Midwives (ACNM) http://www.midwife.org/; look at the informational handouts at http://www.midwife.org/Share-With-Women     www.mymidwife.org    Mother to Baby (Medication and Herbal guidance in pregnancy): http://www.mothertobaby.org  Toll-Free Hotline: 303.570.6940  LactMed (Medication use while breastfeeding): http://toxnet.nlm.nih.gov/newtoxnet/lactmed.htm    Women's Health.gov:  http://www.womenshealth.gov/a-z-topics/index.html    American pregnancy association - http://americanpregnancy.org    Centering Pregnancy (group prenatal care option): http://centeringhealthcare.org    Information about doulas:  Childbirth collective: http://www.childbirthcollective.org/  Doulas of [place] (NORI):  www.nori.org  [facility]  project: http://twincitiesdoulaproject.com/     Early Childhood and Family Education (ECFE):  ECFE offers parents hands-on learning experiences that will nourish a lifetime of teachable moments.  http://ecfe.info/ecfe-home/    March of Dimes www.Splendid Lab.Vocollect - Nutrition  www.mypyramid.gov  Under \"For Consumers,\" click on \"pregnant and breastfeeding women.\"      Centers for Disease Control and Prevention (CDC) - Vaccines : http://www.cdc.gov/vaccines/       When researching information on the web, question the validity of websites.  The domains " ".gov, .edu and.org tend to be more reliable information.  If there are a lot of advertisements, be cautious of the information provided. Stay away from blogs and chat rooms please!             Virtual Breastfeeding Support:    During this time of isolation, breastfeeding families need even more community!  Here are some area organizations offering virtual support groups for breastfeeding:    [employer] Support Group,  at 10:30 am   Run by TERESA Chin of [employer] [facility]   Go to [employer] Facebook page and click on \"events\" for link   https://www.SimpleReach.com/events/441552997414883/  [facility] Milk Hour,  at 2:30 pm    Run by TERESA Simms   Go to [facility] FB page and send message to get link   https://www.SimpleReach.com/Keynoirfoundations/  [facility] holding virtual meetings the first Tuesday of each month, 8-9 pm, and the   Third Saturday, 10 - 11 am.  Go to [facility] and [place] FB page; message to get link [email or web address]://[email or web address] césar/?hc_location=[facility] offers a Lactation Lounge every Friday 12pm - 1pm, run by Nguyen Decker [facility] Leader   Sign up via link at Intronis/cbe-lactation   https://www.Intronis/cbe-lactation  [facility] is offering virtual support groups every Monday, 10:30 am - 12 pm, run by nurse IBCLC   Https://www.facebook.com/events/438064481680653/    Prenatal Breastfeeding Classes:      Bloom[employer] is offering virtual breastfeeding and  care classes:  https://www.Intronis/education-workshops  BirthEd childbirth and breastfeeding education offering virtual prenatal breastfeeding classes  https://www.birthedmn.com/workshops    Breastfeeding clinic visits are at [facility] on , [facility] on  and [facility] " on . Call to schedule, [phone].    New Parent Connection:   Nelda Kang, [id number] [facility]  In-person meetings on  from 6 pm - 7:15 pm for parents of  to 9 months, at the same site.   All are free, drop-in, no registration required.    There are also free virtual meetings ongoing on :  11:30 am - 12:30 pm for parents of newborns to 3 months  4:15 pm to 5:15 pm for parents of 3 to 9-month olds  For joining info parents should call Jocelyne Leach at [phone]

## 2023-04-05 ENCOUNTER — PRENATAL OFFICE VISIT (OUTPATIENT)
Dept: MIDWIFE SERVICES | Facility: CLINIC | Age: 28
End: 2023-04-05

## 2023-04-05 VITALS
HEART RATE: 68 BPM | WEIGHT: 165 LBS | SYSTOLIC BLOOD PRESSURE: 108 MMHG | HEIGHT: 62 IN | DIASTOLIC BLOOD PRESSURE: 68 MMHG | BODY MASS INDEX: 30.36 KG/M2

## 2023-04-05 DIAGNOSIS — Z34.00 SUPERVISION OF NORMAL FIRST PREGNANCY, ANTEPARTUM: Primary | ICD-10-CM

## 2023-04-05 PROCEDURE — 99207 PR PRENATAL VISIT: CPT | Performed by: ADVANCED PRACTICE MIDWIFE

## 2023-04-05 NOTE — PATIENT INSTRUCTIONS
"\"We hope you had a positive experience and that you can definitely recommend [facility] to your family and friends. You ll be receiving a survey soon and we look forward to hearing your feedback\".    [facility]  - Contact information:  Appointment line and to get a hold of CNM in clinic Monday-Friday 8 am - 5 pm:  [phone].  There are some clinics with early start times (1st appointment 7:40 am) and others with evening hours (last appointment 6:20 pm).  Most are typically open from 8 am to 5 pm.    CNM on call answering service: [phone].  Specify your hospital of choice and leave a brief message for CNM;  will then page CNM who is on call at your specified hospital and you should receive a call back with 15 minutes.  Be sure that your ringer is audible and that you can accept blocked calls so that we can get back in touch with you! This number should be reserved for urgent needs if during the day, before 8 am, after 5 pm, weekends, holidays.    Contact the on-call CNM with warning signs, such as:  vaginal bleeding   Vaginal discharge and itching or pain and burning during urination  Leg/calf pain or swelling on one side  severe abdominal pain  nausea and vomiting more than 4-5 times a day, or if you are unable to keep anything down  fever more than 100.4 degrees F.     WISHCLOUDShart  After each of your visits you are welcome to check iPowow for your visit summary including education and links to information relevant to your pregnancy and/or well woman care.   Find the \"Visits\" tab at the top of the page, you will see a list of recent visits and for each visit a for link for \"View After Visit Summary.\" View of your After Visit Summary will allow you to read our recommendations from your visit, review any education provided, and link to websites with useful information.   If you have any questions or difficulty navigating AtTask, please feel free to " "contact us and we will do our best to direct you.  Meet the Midwives from [facility]  You are invited to an informational meet and greet with [facility] Certified Nurse-Midwives. Our free \"Meet the Midwives\" event is a great opportunity to learn about our midwives' philosophy and experience, the hospitals where we can assist with your birth, and answer questions you may have. Partners, friends, and family are welcome to attend. Currently, this is a virtual event.  Date  Last Thursday of every month at 7 pm.    Link to next (live) meeting  [email or web address]-[facility]  To Join by Telephone (audio only) Call:   [phone] Phone Conference ID: [phone]#      Touring the Maternity Care Center  At this time we are offering a virtual tour of the Maternity Care Centers at both [facility] and [facility]:   [facility] Maternity Care:   [email or web address]:   [email or web address]://[email or web address]/locations/[facility]      Breastfeeding and Birth Control  How do I decide what birth control method is best for me while I am breastfeeding?  Choosing a method of birth control is very personal. First, answer the following questions:     Do you want to have more children?   How much spacing between births do you want for your children?   Do you smoke or have you had any health problems, such as liver disease or a blood clot?  Talk about the answers to each of these questions with your health care provider to help you choose the best method for you.    Can I use breastfeeding as my birth control?  Using breastfeeding as your birth control (the lactational amenorrhea method) can be a good way to keep from getting pregnant in the first months after the baby is born. Each time " your baby nurses, your body releases a hormone called prolactin, which stops your body from making the hormones that cause you to ovulate (release an egg). If you are not ovulating, you cannot get pregnant.    The lactational amenorrhea method works only if:   you have not started your period yet.   you are breastfeeding only and not giving your baby any other food or drink.   you are breastfeeding at least every 4 hours during the day and every 6 hours at night.   your baby is less than 6 months old.  When any 1 of these 4 things is not happening, you no longer have good protection from getting pregnant, and you should use another form of birth control.    What birth control methods are safe for me to use while I breastfeed?    Methods without hormones  Methods without hormones do not affect you, your baby, or your breastfeeding.  Methods without hormones that are the most effective   The copper intrauterine contraceptive device (IUD) (ParaGard) is a small, T-shaped device that is in- serted into your uterus (womb) through the vagina and cervix. The copper IUD lasts for 10 years.   Sterilization (getting your tubes tied or your partner having a vasectomy) is very effective, but it is per- manent. You should choose sterilization only if you do not want to have more children.  A method without hormones that is effective   The lactational amenorrhea method described above is effective for the first 6 months.  Methods without hormones that are less effective   Natural family planning is monitoring your body for signs of ovulation and not having sex when you think you are ovulating. This method is reliable only if you are having regular periods every month.   Barrier methods (condoms, diaphragms, sponges, and spermicides) are used at the time you have sex. These methods are effective only if you use them correctly every time.    Methods with hormones  Birth control methods that use hormones can be used while you are  breastfeeding. They may have a small effect on lowering the amount of milk you make. All hormones will get into your breast milk in very small amounts, but there is no known harm to your baby from this small amount of hormone in breast milk.only methodsmethods use only 1 hormone, called progestin. You can start them right after your baby is born or wait 4 to 6 weeks to make sure your milk supply is good.   Progestin-only pills ( minipills ): If you like to take pills every day, you can use the minipill. In order forpill to work well, you have to take 1 at the same time each day. When you stop breastfeeding, you should start pills that have both estrogen and progestin because they are better at keeping you from get- ting pregnant.   Progestin IUD (Mirena): The progestin IUD is shaped and inserted into the uterus like the copper IUD. It works for up to 5 years. Both IUDs are usually inserted 4 to 6 weeks after the baby is born.  Progestin implant (Nexplanon): The progestin implant is a small matchstick-sized flexible shyann. It is placed into the fatty tissue in the back of your arm. It works for up to 3 years.  Progestin shot (Depo-Provera): The progestin shot is given every 3 months.    estrogen and progestin methods  These methods use 2 hormones, called estrogen and progestin.     These methods increase your risk of a blood clot, which is already higher than normal after you have a baby. You should not use them until your baby is at least 6 weeks old. The combined methods are not recommended as the first choice for women who are breastfeeding. If a combined method is the one that you feel will be best for you to prevent getting pregnant, these methods are okay to use while breastfeeding.   Combined birth control pills: You take a pill each day.  Vaginal ring (NuvaRing): The ring is worn in the vagina for 3 weeks then left out for 1 week before youin a new ring.  Patch (Ortho Evra): The patch is placed on your skin and  changed every week for 3 weeks then left off forweek before putting a new patch on a different area of your skin.    Pediatric Care Providers at [facility]:    Choosing the right provider is one of the most important decisions you ll make about your health care. We can help you find the right one. Remember, you re looking for a provider you can trust and work with to improve your health and well-being, so take time to think about what you need. Depending on how complicated your health care needs are, you may need to see more than one type of provider.    Primary Care Providers: You ll see a primary care provider first for most health issues. They ll work with you to get your recommended screenings, help you manage chronic conditions, and refer you to other types of providers if you need them. Your primary care provider may be called a family physician or doctor, internist, general practitioner, nurse practitioner, or physician s assistant. Your child or teenager s provider may be called a pediatrician.  Specialists: You ll see a specialist for certain services or to treat specific conditions. Specialists include cardiologists, oncologists, psychologists, allergists, podiatrists, and orthopedists. You may need a referral from your primary care provider before you go to a specialist in order for your health plan to pay for your visit.\michelledHere are some tips for finding a provider where you live:  If you already have a provider you like and want to keep working with, call their office and ask if they accept your coverage.  Call your insurance company or state Medicaid and CHIP program. Look at their website or check your member handbook to find providers in your network who take your health coverage.  Ask your friends or family if they have providers they like and use these tools to compare health care providers in your area.    Family Medicine at  [facility]:     [email or web address]-medicine    Many of our families enjoy all seeing the same doctor, who comes to know the whole family very well. We base our practice on the knowledge of the patient in the context of family and community.  WHY CHOOSE A FAMILY MEDICINE PHYSICIAN?  Ability of the whole family to see the same doctor  Focus on the whole person, including physical and emotional health  A personal relationship with their doctor that is nurtured over time  Respect for individual and family beliefs and values  No need to change primary care providers when a certain age is reached  Coordination of care when other health care services are needed    Pediatrics at  [facility]:   [email or web address]-care    Through a teaching affiliation with the [facility], [facility] staff keeps current on new developments in the field of pediatrics.     Everything we do centers around caring for children. We place special emphasis on wellness and prevention.pediatric care team includes a team of pediatricians and certified nurse practitioners who provide care to pediatric and adolescent patients ages 0 to 18, and some up to the age of 26. We offer preventive health maintenance for healthy children as well the diagnosis and treatment of common and chronic illnesses and injuries. In addition, we also offer several pediatric specialists who focus on adolescent health issues and developmental and behavioral issues.    Circumcision    Educational video:     [email or web address]    What is circumcision?  At birth, baby boys have loose skin that covers the head of the penis. This skin is called the foreskin. When all or part of the foreskin of the penis is cut off, this is called circumcision.  Why is circumcision done?  Circumcision is done for many reasons including Sikh,  cultural, looks, and health. Some Spiritism groups circumcise all boys as a kushal-based practice. Many people in the [place] choose to circumcise their baby boys because they believe it is culturally normal. It is not a common practice in [place], [place], or [place].  Some parents choose circumcision so that their son will have a penis that looks like his father s if the father was also circumcised. Other people choose circumcision because they believe it is  or will protect the boy or man from infection or cancer later in life.  Does circumcision protect against infection or cancer?  Circumcision does seem to protect against some types of infection or cancer. Cancer of the penis is one type of cancer that circumcision may prevent. However, cancer of the penis is very rare. One hundred thousand circumcisions would need to be done to prevent one case of cancer of the penis. Circumcision may also decrease the chance of some sexually transmitted infections, such as HIV and human papilloma virus (HPV). See the next page for more information on the risks and benefits of circumcision.  What happens during a circumcision?  Babies born in the hospital are usually circumcised before they go home. Health care providers also perform circumcisions in their offices and clinics within a few weeks after birth.  Moravian circumcisions are most often done at home or in a Sikhism.  Before the circumcision is performed, some providers give an injection (shot) of a small amount of anesthetic (numbing medicine) at the base of the penis to block the pain or put an anesthetic cream on the penis to numb the area that will be cut.  There are 2 different ways to do a circumcision. In one type, a clamp placed around the head of the penis cuts off the blood supply to the foreskin, and the foreskin above the clamp is cut off. The clamp is left on the penis until the area heals and it falls off a few days later. In another  type of circumcision, the foreskin is cut off with scissors or a scalpel.  After the circumcision, petroleum jelly and sometimes gauze may be put over the area of the penis where the skin was removed. This protects the end of the penis while it heals.  Can I keep my son s penis  if it is circumcised?  Regular washing with soap and water will keep any penis clean. Circumcision does not make the penis . Uncircumcised boys do need to be taught to clean beneath their foreskin, just like they need to be taught to wash their hands or brush their teeth.  How do I decide if I should have my son circumcised?  The American Academy of Pediatrics (AAP) says that  circumcision may have health benefits. They do not recommend circumcision for all boys as a routine procedure. The AAP recommends that you talk to your health care provider to decide if circumcision is the right choice for your family. You may also wish to discuss the question with your family or .  What are the risks and benefits of circumcision?  We do not have a lot of good scientific information about the health risks or benefits of circumcision.  Possible Risks:  Very few baby boys (less than 1 in 100) will have a problem after circumcision, such as bleeding or mild infection of the penis. These problems are usually not serious and are easy to treat.  Less common problems are:   Removal of too much or too little foreskin  Some rare problems are:   Narrowing of the opening of the penis, which can cause problems with urination   Removal of part of the penis or death of some of the other skin on the penis  Infection that spreads to other parts of the body  People used to think babies did not really feel pain. Now we know that they do. Many baby boys appear to feel a lot of pain during circumcision if anesthesia is not used.  We do not know if circumcision affects sexual function or sensation.  Possible Benefits:   Less risk for  some kinds of cancers, like cancer of the penis   Fewer urinary tract (bladder or kidney) infections for babies   Less risk for some sexually transmitted infections, such as HIV, herpes, and HPV    May protect female sexual partners from some sexually transmitted infections    For More Information  American Academy of Family Physicians: Circumcision  http://familydoctor.org/familydoctor/en/pregnancy-newborns/caring-for-newborns/infant- care/circumcision.html  MedlinePlus: Circumcision (includes a slide show on the procedure)  www.nlm.nih.gov/medlineplus/circumcision.html  American Academy of Pediatrics: Policy statement on circumcision  Http://pediatrics.aappublications.org/content/130/3/e756.abstract      Childbirth and Parenting Education:         Everyday Miracles:   https://www.everyday-miracles.org/    Free Video Series from [facility]: https://nursing.[facility]/academics/specialty-areas/nurse-midwifery/having-baby-prenatal-videos/having-baby-prenatal-and    MARY ALICE parenting center: http://[facility] RegionSalesforce Radian6/   (893) 406-BABY  Blooma: (education, yoga & wellness) www.Elliptic Technologies.Obvious  Enlightened Mama: www.enlightenedmama.Obvious   Childbirth collective: (Parent topic nights)  www.childbirthcollective.org/  Hypnobabies:  www.hypnobabiestwincities.com/  Hypnobirthing:  Http://hypnobirthing.com/  The Birth Hour: https://theEtransmedia Technology.Obvious/online-childbirth-class/    APPS and Podcasts:   Audra Hood Nurture    Evidence Based Birth  The Birth Hour (for birth stories)   Birthful   Expectful   The Longest Shortest Time  PregnancyPodcast Maira Ryan    Book Recommendations:   Cheryl Waterbury's Birthing From Within--first few chapters include a new-age tone, you may prefer to skip it and keep going, because there is good stuff later.  This book recommendation covers emotional preparation, but does cover coping with pain, and use of both pharmacological and nonpharmacological methods.    Dr. Feliciano' The Pregnancy Book  "and The Birth Book--the pregnancy book goes month-by month      The Birth Partner by Yissel Moralez    Womanly Art of Breastfeeding by La Leche League International   Bestfeeding by Chasity Warren--great pictures    Mothering Your Nursing Toddler, by Stacey Ernandez.   Addresses dealing with so many of the challenging behaviors of a nursing toddler.  How Weaning Happens, by La Leche League.  Discusses weaning at all ages, from medically necessary weaning of an infant, all the way up to age 5 (or older), with why/why not, and strategies.  Very empowering book both for deciding to wean and deciding not to.    American College of Nurse-Midwives (ACNM) http://www.midwife.org/; look at the informational handouts at http://www.midwife.org/Share-With-Women     www.mymidwife.org    Mother to Baby (Medication and Herbal guidance in pregnancy): http://www.mothertobaby.org  Toll-Free Hotline: 792.371.9472  LactMed (Medication use while breastfeeding): http://toxnet.nlm.nih.gov/newtoxnet/lactmed.htm    Women's Health.gov:  http://www.womenshealth.gov/a-z-topics/index.html    American pregnancy association - http://americanpregnancy.org    Centering Pregnancy (group prenatal care option): http://centeringhealthcare.org    Information about doulas:  Childbirth collective: http://www.childbirthcollective.org/  Doulas of [place] (NORI):  www.nori.org  [facility]  project: http://twincitiesdoulaproject.com/     Early Childhood and Family Education (ECFE):  ECFE offers parents hands-on learning experiences that will nourish a lifetime of teachable moments.  http://ecfe.info/ecfe-home/    March of Dimes www.Definigen.CirroSecure - Nutrition  www.mypyramid.gov  Under \"For Consumers,\" click on \"pregnant and breastfeeding women.\"      Centers for Disease Control and Prevention (CDC) - Vaccines : http://www.cdc.gov/vaccines/       When researching information on the web, question the validity of websites.  The domains " ".gov, .edu and.org tend to be more reliable information.  If there are a lot of advertisements, be cautious of the information provided. Stay away from blogs and chat rooms please!             Virtual Breastfeeding Support:    During this time of isolation, breastfeeding families need even more community!  Here are some area organizations offering virtual support groups for breastfeeding:    [employer] Support Group,  at 10:30 am   Run by TERESA Chin of [employer] [facility]   Go to [employer] [facility] [employer] Facebook page and click on \"events\" for link   https://www.NoRedInk.com/events/763917037160985/  [employer] Milk Hour,  at 2:30 pm    Run by TERESA Simms   Go to [facility] FB page and send message to get link   https://www.NoRedInk.com/XP Investimentosfoundations/  [facility] holding virtual meetings the first Tuesday of each month, 8-9 pm, and the   Third Saturday, 10 - 11 am.  Go to [facility] [place] FB page; message to get link [email or web address] [email or web address]_[email or web address]=[facility] offers a Lactation Lounge every Friday 12pm - 1pm, run by Nguyen Decker [facility] Leader   Sign up via link at SourceThought/cbe-lactation   https://www.SourceThought/cbe-lactation  [facility] is offering virtual support groups every Monday, 10:30 am - 12 pm, run by nurse IBCLC   Https://www.facebook.com/events/726912143816990/    Prenatal Breastfeeding Classes:      [employer] is offering virtual breastfeeding and  care classes:  https://www.SourceThought/education-workshops  BirthEd childbirth and breastfeeding education offering virtual prenatal breastfeeding classes  https://www.birthedmn.com/workshops    Breastfeeding clinic visits are at [facility] on , [facility] on  and [facility] " on . Call to schedule, [phone].    New Parent Connection:   Nelda Kang, [id number] [facility]  In-person meetings on  from 6 pm - 7:15 pm for parents of  to 9 months, at the same site.   All are free, drop-in, no registration required.    There are also free virtual meetings ongoing on :  11:30 am - 12:30 pm for parents of newborns to 3 months  4:15 pm to 5:15 pm for parents of 3 to 9-month olds  For joining info parents should call Jocelyne Leach at [phone]

## 2023-04-05 NOTE — PROGRESS NOTES
Jean Paul presents with Chor for a routine PNV at 34w 5d. Professional CloudVelocity  via telephone utilized for entire visit, as Ipad interpretive services not working. Feeling overall well, no questions or concerns.    EPDS today = 0/30.    Reviewed maternal growth chart.   Overall more than recommended weight gain, however, interval weight gain stabilized.   Fetus likely vertex by Leopold's maneuvers, please confirm next visit.  RTC 2 weeks, sooner as needed.    NV: GBS and Hgb  Has CNM numbers and aware to call with DFM, LOF, PTL, or any questions or concerns.  Verbalizes has CNM phone numbers to call/page CNMs.    SARAH Laguerre CNM    4/5/2023   9:33 AM

## 2023-04-19 ENCOUNTER — PRENATAL OFFICE VISIT (OUTPATIENT)
Dept: MIDWIFE SERVICES | Facility: CLINIC | Age: 28
End: 2023-04-19

## 2023-04-19 VITALS
DIASTOLIC BLOOD PRESSURE: 62 MMHG | WEIGHT: 174 LBS | HEART RATE: 74 BPM | SYSTOLIC BLOOD PRESSURE: 112 MMHG | BODY MASS INDEX: 32.34 KG/M2

## 2023-04-19 DIAGNOSIS — Z34.00 SUPERVISION OF NORMAL FIRST PREGNANCY, ANTEPARTUM: Primary | ICD-10-CM

## 2023-04-19 LAB — HGB BLD-MCNC: 11.7 G/DL (ref 11.7–15.7)

## 2023-04-19 PROCEDURE — 99207 PR PRENATAL VISIT: CPT | Performed by: ADVANCED PRACTICE MIDWIFE

## 2023-04-19 PROCEDURE — 36415 COLL VENOUS BLD VENIPUNCTURE: CPT | Performed by: ADVANCED PRACTICE MIDWIFE

## 2023-04-19 PROCEDURE — 87653 STREP B DNA AMP PROBE: CPT | Performed by: ADVANCED PRACTICE MIDWIFE

## 2023-04-19 PROCEDURE — 85018 HEMOGLOBIN: CPT | Performed by: ADVANCED PRACTICE MIDWIFE

## 2023-04-19 NOTE — PROGRESS NOTES
Jean Paul presents to clinic with Nou today for a routine PNV at 36w3d. Seen with assistance of professional Remote Assistant  via Ipad- video turned off during exam.  Feeling overall well.  No questions or concerns.    Reviewed maternal growth chart.    Fetus ROS by Leopold's Maneuvers and confirmed with bedside ultrasound.Ddeclines SVE.      GBS and Hgb today.    RTC 1 week, sooner as needed.    Has CNM numbers and aware to call with SROM, labor, or any questions or concerns.    OP or other malposition Risks: nulliparity.  Reviewed maternal postures and exercises to encourage optimal fetal positioning until labor begins.    SARAH Laguerre CNM   4/19/2023   9:53 AM

## 2023-04-19 NOTE — PATIENT INSTRUCTIONS
"\"We hope you had a positive experience and that you can definitely recommend [facility] to your family and friends. You ll be receiving a survey soon and we look forward to hearing your feedback\".    [facility]  - Contact information:  Appointment line and to get a hold of CNM in clinic Monday-Friday 8 am - 5 pm:  [phone].  There are some clinics with early start times (1st appointment 7:40 am) and others with evening hours (last appointment 6:20 pm).  Most are typically open from 8 am to 5 pm.    CNM on call answering service: [phone].  Specify your hospital of choice and leave a brief message for CNM;  will then page CNM who is on call at your specified hospital and you should receive a call back with 15 minutes.  Be sure that your ringer is audible and that you can accept blocked calls so that we can get back in touch with you! This number should be reserved for urgent needs if during the day, before 8 am, after 5 pm, weekends, holidays.    Contact the on-call CNM with warning signs, such as:  vaginal bleeding   Vaginal discharge and itching or pain and burning during urination  Leg/calf pain or swelling on one side  severe abdominal pain  nausea and vomiting more than 4-5 times a day, or if you are unable to keep anything down  fever more than 100.4 degrees F.     [facility]  After each of your visits you are welcome to check Quu for your visit summary including education and links to information relevant to your pregnancy and/or well woman care.   Find the \"Visits\" tab at the top of the page, you will see a list of recent visits and for each visit a for link for \"View After Visit Summary.\" View of your After Visit Summary will allow you to read our recommendations from your visit, review any education provided, and link to websites with useful information.   If you have any questions or difficulty navigating [facility], please feel free to " "contact us and we will do our best to direct you.  Meet the Midwives from [facility]  You are invited to an informational meet and greet with [facility]ified Nurse-Midwives. Our free \"Meet the Midwives\" event is a great opportunity to learn about our midwives' philosophy and experience, the hospitals where we can assist with your birth, and answer questions you may have. Partners, friends, and family are welcome to attend. Currently, this is a virtual event.  Date  Last Thursday of every month at 7 pm.    Link to next (live) meeting  [email or web address]  To Join by Telephone (audio only) Call:   [phone] Phone Conference ID: [phone] #    Touring the Maternity Care Center  At this time we are offering a virtual tour of the Maternity Care Centers at both [facility] and [facility]:   [facility] Maternity Care:   [email or web address]/[email or web address]/[email or web address]--[facility] Maternity Care:   [email or web address]    Scroll to the bottom of this hyperlink if the above link does not work      Postpartum Depression  The first weeks of caring for a  baby are more than a full-time job. Although it is often a happy time, your feelings and moods may not be what you expected. Many women experience  baby blues.  Here are some tips to help you understand when feelings of sadness are normal, and when you should call your health care provider.    What are the baby blues?  As many as 3 of every 4 women will have short periods of mood swings, crying, or feeling cranky or restless during the first weeks after birth. These feelings can be worse when you are tired or anxious. Women who have the baby blues often say they feel like crying but don t know why. Baby blues usually happen in the first or second week " postpartum (after you give birth) and last less than a week. If you are not sleeping, becoming more upset, don t feel like you can take care of your baby, or your sadness lasts 2 weeks or more, call your health care provider.    What is postpartum depression?  About one in every 5 women will develop depression during the first few months postpartum that may be mild, moderate, or severe. Women who have postpartum depression may have some of these symptoms:  Feeling guilty   Not able to enjoy your baby and feeling like you are not bonding with your baby    Not able to sleep, even when the baby is sleeping  Sleeping too much and feeling too tired to get out of bed  Feeling overwhelmed and not able to do what you need to during the day  Not able to concentrate  Don t feel like eating  Feeling like you are not normal or not yourself anymore  Not able to make decisions  Feeling like a failure as a mother  Feeling lonely or all alone  Thinking your baby might be better off without you  If you have any of these symptoms, call your health care provider!    Which symptoms of postpartum depression are dangerous?  Sometimes a woman with postpartum depression will have thoughts of harming herself or her baby. If you find yourself thinking about hurting yourself or your baby, call your health care provider immediately.    MOTHERHOOD: THE EARLY DAYS  You prepare for the birth of your baby for many months during pregnancy, and then the first months at home after your baby is born can be a quiet, gentle time of getting to know this new person who has come to live in your home. But for most women it is not all quiet or sweet. And for some women it is a very hard time.  What Can I Expect in the First Few Months After the Baby Comes?  New mothers and their families face many challenges in the first few months:  Your body and your hormones have to get back to normal.  You and the baby will be learning to breastfeed.  Babies only sleep a  few hours at a time. The entire family will have a hard time getting enough sleep.  You and your family need to learn how to parent this new family member.  If you have a partner, you have to figure out how to stay together as a couple and maybe even start to have sex again.  You may have to figure out how to keep from getting pregnant again right away.  You may need to return to work and find day care.    How Long Will it Take for My Body to Get Back to Normal?  Some changes will occur quickly. Others will not occur as quickly.  Your uterus, cervix, and vagina will all shrink to their nonpregnant size in about 2 weeks. Your vagina may be tender and dry for a few months--especially if you are breastfeeding.  If you had stitches or hemorrhoids, your   bottom   will be sore for 2 weeks or more.  For some women who have problems urinating, it can take several months for you to be able to hold your urine when you cough or sneeze or suddenly  something heavy.  Your breast milk will   come in   2 to 3 days after the birth of your baby. It will take 6 to 8 weeks for you and the baby to get the hang of breastfeeding and find a pattern. During these first weeks, you can have engorged breasts at times and often leak milk.  Your stomach and intestines all have to fall back into place. You may have a lot of gas for a few weeks.  You may be constipated--especially if you are breastfeeding.  Your stretched stomach muscles can recover in a few weeks, but for some women it takes longer--6 months or a year--to recover.  If you had a  delivery, you may have pain or numbness around the incision for 6 months or more.  Losing the weight you gained during pregnancy will probably take 6 months to a year. Have patience! It took 40 weeks to get here. Give yourself 40 weeks to get back.    What Can I Expect When My Hormones Change?  About 75% of all women will get the   blues.   This usually starts about 3 days after the birth  of your baby. You may cry easily and feel very, very tired. A few women become very depressed. If you had a  delivery or your new baby was sick, you are at a higher risk for depression.  Call your health care provider right away if you cannot care for yourself or your baby, if you feel very nervous or worried, if you cannot stop crying, or if you are having thoughts of hurting yourself or your baby.    Taking Care of Yourself  While you are still pregnant:  Talk with your partner and your family about the time ahead. Arrange for someone to help you during the first weeks at home if you can.  Talk with your health care provider about birth control options and make a plan before the baby comes.  If you are worried about how to parent a , take parenting classes. You will learn a lot about how babies act and you will make some friends who are going through the same thing at the same time. Most [facility] have these classes.  Arrange for someone to help with baby care if you can.  After the baby comes:  Ask for help. Let other people do the cooking and cleaning and run the house. Focus on yourself and your baby.  Sleep whenever you can. Try not to be tempted to   get some things done   when the baby sleeps. This is your time to sleep, too.  Drink lots of water. You will need at least 6 big glasses of water everyday to avoid constipation and make enough breast milk. Every time you sit down to breastfeed, have a big glass of water with you to drink while you are nursing.  Eat lots of vegetables and fruit. You will need lots of vitamins and fiber to help your body get back to normal. This will also help you avoid constipation.  Go outside and walk. Babies can go outside even if it is very cold. Fresh air and sunshine will do you both good.  Take sitz baths. Put about 6 inches of warm water in your bathtub and sit in there for 15 minutes 2 to 3 times a day. This will help your   bottom   heal more quickly. It  will also give you 15 minutes of private time!  Talk to other mothers. Join a new parents group. Call Ashley and go to [facility] meetings if you are breastfeeding.     With your partner:  Keep talking. Share the experience.  Spend time alone. Even a 30-minute walk can be a date.  Start a birth control method. You can get pregnant before you even have a period. It is very important to use birth control if you do not want to get pregnant again right away.  When you have sex, use a lubricant. A lot of lubricant! Take it slow.  The first few months after a baby comes can be a lot like floating in a jar of honey--very sweet and pink, but very sticky, too. Take time to enjoy the good parts. Remind yourself that this time will pass. Bon voyage!    FOR MORE INFORMATION  For questions about depression during and after pregnancy:  http://www.womenshealth.gov/publications/our-publications/fact-sheet/depression-pregnancy.html   After birth: The first 6 weeks:  http://www.[employer]/Post-Birth-and-Recovery   Breastfeeding resources:  http://www.Relative.aidiesCarlyps[employer]vMulu.org/health-info/getting-breastfeeding-off-to-a-good-start/    Preparing for your baby:       Car Seat Clinics:  https://dps.[place].gov/divisions/ots/child-passenger-safety/Pages/car-seat-checks.aspx  [facility]    Free Car Seat Distribution Facilities     By Appt. Address Contact Information (For appointment)      \Yes [employer] [facility]\[address]\[place],\cell Yesenia Padilla)[email or web address]      Yes [facility]\ [facility]\[place],\cell Shannan Israel)[email or web address]      Yes [facility]\[address]\[place],\cell Adriana Le)[phone]      Immunizations:  http://www.cdc.gov/vaccines/schedules/easy-to-read/child.html    [place] Screening Program  Http://www.[facility]./newbornscreening/  [place]  newborns are tested soon after birth for more than 50 hidden, rare disorders, including hearing loss and critical congenital heart disease (CCHD). This site provides resources and information for families and providers.    When to call:   Appointment line and to get a hold of a midwife in clinic Monday-Friday 8 am - 5 pm:  [phone].  There are some clinics with early start times (1st appointment 7:40 am) and others with evening hours (last appointment 6:20 pm).  Most are typically open from 8 am to 5 pm.    CNM on call answering service: [phone].  Specify your hospital of choice and leave a brief message for a midwife;  will then page a midwife who is on call at your specified hospital and you should receive a call back with 15 minutes.  Be sure that your ringer is audible and that you can accept blocked calls so that we can get back in touch with you! This number should be reserved for urgent needs if during the day, before 8 am, after 5 pm, weekends, holidays.    Contact the on-call CNM with warning signs, such as:  vaginal bleeding   Vaginal discharge and itching or pain and burning during urination  Leg/calf pain or swelling on one side  severe abdominal pain  nausea and vomiting more than 4-5 times a day, or if you are unable to keep anything down  fever more than 100.4 degrees F.     Make plans for transportation and  as needed for when you are going to the hospital.    Ask your health care provider about vaccinations you may need following delivery. By now, you should have received a Tdap immunization to protect against pertussis or whooping cough. Fathers and family members who will be in close contact with the baby should also receive a Tdap shot at least two weeks before the expected birth of the baby if they have not had a Td (tetanus) shot for at least two years.    Your midwife may offer to check your cervix for changes. If you are past your due date, discuss the next  steps leading to delivery with your midwife. If you don't start labor on your own by 41 or 42 weeks, your midwife may recommend giving you medicines to ripen your cervix and start labor.  Induction of labor: http://onlinelibrary.leo.com/store/10.1016/j.jmwh.2008.04.018/asset/j.jmwh.2008.04.018.pdf?v=1&t=jyim8dps&m=[email or web address]    Tell your midwife or physician how you plan to feed your baby (breast or bottle), who you have chosen to do pediatric care for your baby, and if you have a boy, whether you have chosen to have him circumcised. You will need a car seat correctly installed in your vehicle to bring your baby home. As you start to set up the nursery at home for your baby, make sure the crib is safe. The mattress needs to fit snugly against the edges of the crib. If you can fit a soda can between the bars, they are too far apart and can allow the baby's head to caught between them.    Learn about infant care and feeding, including information about infant CPR. We recommend that you put your baby to sleep on his or her back to reduce the chance of Sudden Infant Death Syndrome (SIDS). To maintain a healthy environment in which your child can grow, it's best to keep your home smoke-free. By preparing ahead, your transition into parenthood will go smoothly for you and your baby.    Your midwife will want to see you for a checkup 2 to 6 weeks after delivery.      Making Plans for Feeding My Baby    By this point, you probably have read a lot about feeding your baby.  Breastfeed or formula? Each mother s decision is her own and [facility] respects you and your choices. We ve gathered information on both breastfeeding and formula feeding to help with your decision. Talking with your physician or nurse-midwife can also help in your decision.  However you plan to feed your baby, [facility] Maternity Care Centers encourage rooming in with your baby, skin-to-skin contact and feeding your baby  based on his or her cues.    Skin-to-skin contact  Being close to mom helps your baby adjust to life outside of the womb.  It helps your baby regulate their body temperature, heart rate, and breathing.  Your baby will usually be placed skin-to-skin immediately following birth or as soon as possible, if medical intervention is needed.    Rooming-In  Having your baby stay with you in your room is called  rooming-in .  Keeping your baby in your room helps you to learn how to care for your baby by getting to know your baby s cues, body rhythms and sleep cycle.       Cue-based feeding  Cues (signals) are baby s way of telling you what he or she wants.  When you learn your infant s cues, you know how to care for and feed your baby.   Feeding cues are the licking and smacking of lips, bringing their fist to their mouth, and a reflex called  rooting - where baby turns and opens his or her mouth, searching for the breast or bottle.  Crying is a late feeding cue.  Babies can feed frequently, often at least 8 times in 24 hours.  Breastfeeding facts  Breast milk is the best source of nutrition for your baby and is available at birth. In the first couple of days, your milk volume is already starting to increase, though it may not be noticeable. Breastfeed frequently to increase your milk supply. Within three to five days, you will begin to notice larger milk volumes. An increase in breast size, heaviness and firmness are often described as the milk  coming in.  Frequent breastfeeding can help breasts from getting overly firm and painful. You will know the baby is getting enough milk if your baby is having wet and dirty diapers and gaining weight.     If your goal is to exclusively breastfeed, it is important to not use any formula or artificial nipples (including bottles and pacifiers) while your baby is learning to breastfeed.  While it may seem like an  easy  option to give your baby a bottle, formula should only be given if  there is a medical reason for your baby to have it.    Positioning and attachment   Get comfortable.  Use pillows as needed to support your arms and baby.  Hold baby close at the level of your breast, facing you in a tummy to tummy position.  Skin to skin helps with this.  Position the baby with his or her nose by the nipple.  There should be a straight line from baby s ear to shoulder to hips.  Tickle your baby s lips or wait for baby to open mouth wide, bring baby to breast by leading with the chin.  Aim the nipple at the roof of baby s mouth.  A rapid sucking pattern is followed by longer, drawing pattern with occasional swallows heard.  When baby is correctly latched, your nipple and much of the areola are pulled well into baby s mouth.      Returning to work or school  Focus on a good start to breastfeeding.  Many women continue to provide breastmilk for their baby when they return to work or school.  Making plans about where to pump and store milk can make the transition go well.  Talk with other mothers who have also returned to work or school for tips and support.  Your employer s Human Resource department may be a resource as well.     Returning to work or school: (continued)   babies can mean fewer  sick  days for you.  A quality breast pump will also save time and add comfort.  Check with your insurance prior to giving birth for breast pump coverage.  Many insurance companies include a pump within your benefits.  Wait until your baby is at least three weeks old to introduce a bottle for the first time.  Have someone besides you give the bottle.  Breastfeed when you are with your baby. Reserve your bottles of breast milk for when you are away.   Your breasts will need to be  emptied  either by your baby or a pump.  Plan to pump at least twice in an eight hour day.  If you cannot pump at work, continue breastfeeding at home. Any amount of breast milk is worth giving to your baby.    Formula feeding  facts  If you are planning to use formula to feed your baby, you will want to make some preparations ahead of time. Talk to your doctor or nurse-midwife about what type of formula to use. Some are iron-fortified, meaning they have extra iron in them. You will want to purchase formula and bottles before your baby is born to be sure you are ready after you return from the hospital. The [facility] do not provide formula samples to take home.    Be sure to follow formula mixing directions closely. Regular milk in the dairy case at the grocery store should not be given to babies under 1 year old. Baby formula is sold in several forms including:  Ready-to-use. This is the most expensive, but no mixing is necessary.  Concentrated liquid. This is less expensive than ready-to-use and you mix with water.  Powder. This is the least expensive. You mix one level scoop of powdered formula with two ounces of water and stir well.    Most babies need 2.5 ounces of formula per pound of body weight each day. This means an 8-pound baby may drink about 20 ounces of formula a day; however, this is just an estimate. The most important thing is to pay attention to your baby s cues.  If your baby is always fussy, needs more iron or has certain food allergies, your physician may suggest you change your baby s formula to a different kind.     How do I warm my baby s bottles?  You may feed your baby a bottle without warming it first. It is OK for the breast milk or formula to be cool or room temperature. If your baby seems to prefer it warmed, you can put the filled bottle in a container of warm water and let it stand for a few minutes. Check the temperature of the liquid on your skin before feeding it to your baby; to be sure it isn t too hot. Do not heat bottles in the microwave. Microwaves heat food and liquids unevenly, and this can cause hot spots that can burn your baby.    How do I clean and sterilize bottles?  Sterilize  bottles and nipples before you use them for the first time. You can do this by putting them in boiling water for 5 minutes. After that first time, you can wash them in hot and soapy water. Rinse them carefully to be sure there is no soap left on them. You can also wash them in the .      Am I in Labor?  What is labor? Labor is the work that your body does to birth your baby. Your uterus (the womb) contracts(tightens). The contractions(labor pains) push your baby down onto your cervix(the opening ofyour uterus). Thispressure causesyour cervix to open. When your cervix iscompletely open (10 centimeters dilated), you will push your baby through your vagina and out into the world.  What do contractions feel like? When contractionsfirst start, theyusually feellike cramps duringyour period. Sometimesyoufeelpain in your back. Mostoften,contractions feel like muscles pulling painfully in your lower belly. At first, the contractions will probably be 15 to 20 minutes apart.They maybe irregular and will not feel too painful. As labor goes on, the contractionsget stronger,closer together, more consistent, and more painful.  How do I time the contractions? When the contractionsseem to be coming regularly, youshould start to time them.You time your contractions by counting the number of minutes from the start of one contraction to the start of the next contraction.  What should I do during early labor when the contractions start? If it is night andyoucan sleep, do so. If it happensduring the day, there are some things you can do to take care of yourself at home: Walk. If the painsyou are having are reallabor, walking will makethecontractionscome closer together and they will be stronger,but you will be able to cope with them better if you are standing or moving around. If the contractions are early labor ones that come andgo (sometimes called false labor), walkingcan make them go away. Take a shower or bath. This will  help you relax. Eat. Labor is a big event.Your body needs a lot of energy to be effective.Eat whatever you feel like eating. Drink water. Not drinking enough water can cause contractions to not be as effectiveas theyshould be.You need to be well hydrated (drinking enoughwater) to help your body work well during labor. Take a na p. If youfeel tired, lay down on your side and get all the rest you can. It helps to be rested when you go intoactive labor. Do something you enjoy. Spend time with family. Watch a movie. Distraction will help you relax. Get a massage. If your labor is in your back, a strong massage on your lower back may feel very good. Getting a foot massage or having a partner rub your feet can also be very relaxing. Don t panic. You can do this. Your body was made for this. You are strong!  When should I call my health care provider if I think I am in labor? Your contractions have been 5 minutes or less apart for at least an hour. Your contractions are becoming so painful youcannot walk or talk during one. You think your amniotic sac (bag of garcia) breaks. You may have a big gush of amniotic fluid (water) or just fluid that runs down your legs when you walk or move or change position.  Are there other reasons to call my health care provider? If you are concerned about anything, don t hesitate to call your health care provider.You should definitely call your health care provider or go to the hospital if:  It is 3 weeks or more before your due date, and you are having contractions.  You have vaginal bleeding that is more than your period, soaks your underwear, or runs down your legs.  You have sudden severe pain that does not go away with rest.  Your baby has not movedfor several hours.  You are leaking greenish fluid.    For More Information: http://onlinelibrary.leo.com/doi/10.1111/jmwh.02306/epdf     US Department of Health and Human Services: Signs oflabor,labor stages, and types of  birth  http://womenshealth.gov/pregnancy/childbirth-beyond/labor-birth.html#a      Childbirth and Parenting Education:       Everyday Miracles:   https://www.everyday-miracles.org/    Free Video Series from [facility]: https://nursing.[facility]/academics/specialty-areas/nurse-midwifery/having-baby-prenatal-videos/having-baby-prenatal-and    MARY ALICE parenting center: http://ClickerKaiser Richmond Medical CenterClinked/   (598) 911-BABY  [place]: (education, yoga & wellness) www.ThoughtLeadraMeetingSprout  Enlightened Mama: www.enlightenedmama.Emailage   Childbirth collective: (Parent topic nights)  www.childbirthcollective.org/  Hypnobabies:  www.hypnobabiestwincities.Emailage/  Hypnobirthing:  Http://hypnobirthing.Emailage/  The Birth Hour: https://F[employer] Solutions/online-childbirth-class/    APPS and Podcasts:   Audra Hood Nurture    Evidence Based Birth  The Birth Hour (for birth stories)   Birthful   Expectful   The Longest Shortest Time  PregnancyPodcast Maira Ryan    Book Recommendations:   Cheryl Clifton's Birthing From Within--first few chapters include a new-age tone, you may prefer to skip it and keep going, because there is good stuff later.  This book recommendation covers emotional preparation, but does cover coping with pain, and use of both pharmacological and nonpharmacological methods.    Dr. Feliciano' The Pregnancy Book and The Birth Book--the pregnancy book goes month-by month      The Birth Partner by Yissel Damon    Womanly Art of Breastfeeding by La Leche League International   Bestfeeding by Chasity Warren--great pictures    Mothering Your Nursing Toddler, by Stacey Ernandez.   Addresses dealing with so many of the challenging behaviors of a nursing toddler.  How Weaning Happens, by La Leche League.  Discusses weaning at all ages, from medically necessary weaning of an infant, all the way up to age 5 (or older), with why/why not, and strategies.  Very empowering book both for deciding to wean and deciding not to.    American College of  "Nurse-Midwives (ACNM) http://www.midwife.org/; look at the informational handouts at http://www.midwife.org/Share-With-Women     www.mymidwife.org    Mother to Baby (Medication and Herbal guidance in pregnancy): http://www.mothertobaby.org  Toll-Free Hotline: 189.209.7433  LactMed (Medication use while breastfeeding): http://toxnet.nlm.nih.gov/newtoxnet/lactmed.htm    Women's Health.gov:  http://www.womenshealth.gov/a-z-topics/index.html    American pregnancy association - http://americanpregnancy.org    Centering Pregnancy (group prenatal care option): http://centeringhealthcare.org    Information about doulas:  Childbirth collective: http://www.childbirthcollective.org/  Doulas of [place] (NORI):  www.nori.org  [facility]  project: http://Petrotechnicscitiesdoulaproject.com/     Early Childhood and Family Education (ECFE):  ECFE offers parents hands-on learning experiences that will nourish a lifetime of teachable moments.  http://ecfe.info/ecfe-home/    March of Dimes www.Cuedd.Generous Deals     FDA - Nutrition  www.mypyramid.gov  Under \"For Consumers,\" click on \"pregnant and breastfeeding women.\"      Centers for Disease Control and Prevention (CDC) - Vaccines : http://www.cdc.gov/vaccines/       When researching information on the web, question the validity of websites.  The domains .gov, .edu and.org tend to be more reliable information.  If there are a lot of advertisements, be cautious of the information provided. Stay away from blogs and chat rooms please!     [facility] Breastfeeding Support    Early Childhood Family Education [place] [address] provides a free, drop-in class/breastfeeding support group, facilitated by a lactation consultant and .  During the group you can connect with other parents, weigh your baby, ask questions about feeding and baby development, and more.  You do not need to register.  Fall in-person meetings will begin on September 12, are for parents of babies from birth to 9 " "months, and will meet on Monday evenings from 6 - 7:15 pm in [facility], which is at [address] [facility].  [place] [address] also offers virtual group meetings with a lactation consultant/.  These take place on , from 11:30 am - 12:30 pm for parents of newborns to 3-month-olds, and from 4:15 - 5:15 for parents of 3 - 9 - month olds.  To get the meeting link contact Jocelyne Leach at [phone].    [facility] offers a free, drop-in breastfeeding support group facilitated by TERESA Herrera.   at [place] [facility] [address], [place].  A scale is available to check baby weights, if desired.  [place] also has a variety of new parent classes:  (cost for registration)  https://Razor Insights/classes/    [facility] Lactation Lounge facilitated by TERESA Tilley:  Free, drop-in support group for breastfeeding, with baby scale available if needed.  Meets at [facility], second Tuesday of each month, 10 am - 12 pm.  Text [phone] for info.    [facility] Support Group,  at 10:30 am   Run by TERESA Chin of [facility]   Go to [facility] Facebook page, click on \"events\" for link:   Https://www.Flixpress.com/events/975496031612266/    [facility] [place] breastfeeding support community:  free, drop-in breastfeeding support facilitated by Certified Lactation Counselors, open  and  from 1 pm - 5 pm.  In [place]:  [facility] [place], [address]:   guidingstsurendrakota.org    [facility] Milk Hour,  at 2:30 pm    Run by TERESA Simms  Go to [facility] FB page and send message to get link   Https://www.Flixpress.com/healthfoundations/    Charli Campa:  http://www.[facility] Cent[facility]das.org/    [facility] offers a Lactation Lounge every Friday 12pm - 1pm, run by Nguyen" "Charli Decker Leader.  Sign up via link at Etece/cbe-lactation   https://www.Etece/cbe-lactation    [facility] is offering virtual support groups every Monday, 10:30 am - 12 pm, run by RN IBCLC: Https://www.CryoXtract Instruments.com/events/719231285638534/    Culturally-Specific Breastfeeding Support:     For Hmong Families:   The Hmong Breastfeeding Coalition is a wonderful support for [place] Hmong women who are breastfeeding.  They are best found on Facebook.    for Black families:    Chocolate Milk Club:  http://www.Auctions by Wallace/chocolate-milk-club/  Email: Demi@Anyone Home    R.O.S.E. = Reaching our Sisters Everywhere  Http://www.breastfeedingrose.org/    Club Mom breastfeeding support for Black mothers:  Contact Travis Guzman  Phone: [phone]   Email:  Conrad@[facility].     Riya Bourgeois  Phone: [phone]   Email:  Bernard@[facility].    Club Dad parent support for Black fathers:   Contact Eder Earl   Phone: [phone]   Email:  Anita@[facility]    For Native/Indigenous Families:    https://www.C[employer]oXtract Instruments.com/groups/nitamising.gimashkikinaan   Virtual Breastfeeding Support:    During this time of isolation, breastfeeding families need even more community!  Here are some area organizations offering virtual support groups for breastfeeding:    [facility] Support Group, Tuesdays at 10:30 am   Run by TERESA Chin of [facility]   Go to [facility] Facebook page and click on \"events\" for link   https://www.facebook.com/events/747927244075248/  [facility] Milk Hour, Thursdays at 2:30 pm    Run by TERESA Simms   Go to [facility] FB page and send message to get link   https://www.CryoXtract Instruments.com/healthfoundations/  [facility] holding virtual " meetings the first Tuesday of each month, 8-9 pm, and the   Third Saturday, 10 - 11 am.  Go to La Leche League of [place] and [place] FB page; message to get link https://www.Travel Distribution Systems.com/LLGlenysfGKenna/?hc_location=ufi  [facility] offers a Lactation Lounge every Friday 12pm - 1pm, run by Charli Lora Leader   Sign up via link at RealSelf/cbe-lactation   https://www.RealSelf/cbe-lactation  [facility] is offering virtual support groups every Monday, 10:30 am - 12 pm, run by nurse TERESA   Https://www.facebook.com/events/219333420315088/    Prenatal Breastfeeding Classes:      [facility] is offering virtual breastfeeding and  care classes:  https://www.RealSelf/education-workshops  BirthEd childbirth and breastfeeding education offering virtual prenatal breastfeeding classes  https://www.birthedmn.com/workshops

## 2023-04-20 LAB — GP B STREP DNA SPEC QL NAA+PROBE: NEGATIVE

## 2023-04-27 ENCOUNTER — PRENATAL OFFICE VISIT (OUTPATIENT)
Dept: MIDWIFE SERVICES | Facility: CLINIC | Age: 28
End: 2023-04-27

## 2023-04-27 VITALS
WEIGHT: 171 LBS | HEART RATE: 70 BPM | SYSTOLIC BLOOD PRESSURE: 102 MMHG | DIASTOLIC BLOOD PRESSURE: 70 MMHG | BODY MASS INDEX: 31.79 KG/M2

## 2023-04-27 DIAGNOSIS — Z60.3 LANGUAGE BARRIER: ICD-10-CM

## 2023-04-27 DIAGNOSIS — Z75.8 LANGUAGE BARRIER: ICD-10-CM

## 2023-04-27 DIAGNOSIS — O26.03 EXCESSIVE WEIGHT GAIN DURING PREGNANCY IN THIRD TRIMESTER: ICD-10-CM

## 2023-04-27 DIAGNOSIS — O09.33 INSUFFICIENT PRENATAL CARE IN THIRD TRIMESTER: ICD-10-CM

## 2023-04-27 DIAGNOSIS — Z34.00 SUPERVISION OF NORMAL FIRST PREGNANCY, ANTEPARTUM: Primary | ICD-10-CM

## 2023-04-27 PROCEDURE — 99207 PR PRENATAL VISIT: CPT | Performed by: MIDWIFE

## 2023-04-27 PROCEDURE — 59426 ANTEPARTUM CARE ONLY: CPT | Performed by: MIDWIFE

## 2023-04-27 SDOH — SOCIAL STABILITY - SOCIAL INSECURITY: ACCULTURATION DIFFICULTY: Z60.3

## 2023-04-27 NOTE — PROGRESS NOTES
"4/19/2023 GBS negative and hemoglobin 11.7 g/dL  Professional andrés  by ipad.    Sundar presents to [facility] for a routine prenatal visit at 37w 4d. Feeling well in general.   Discussed [facility]  No questions  Feeling \"ready, but a little nervous\" about upcoming labor/birth.     Reviewed our recommendation that she take an iron supplement daily to boost her iron stores prior to birth   Pt is currently supplementing iron some days. No constipation now;      Reviewed term labor precautions, warning signs and when/how to call the on-call CNM. Encouraged weekly visits until birth.   "

## 2023-05-05 ENCOUNTER — PRENATAL OFFICE VISIT (OUTPATIENT)
Dept: MIDWIFE SERVICES | Facility: CLINIC | Age: 28
End: 2023-05-05

## 2023-05-05 VITALS — SYSTOLIC BLOOD PRESSURE: 98 MMHG | BODY MASS INDEX: 32.16 KG/M2 | DIASTOLIC BLOOD PRESSURE: 60 MMHG | WEIGHT: 173 LBS

## 2023-05-05 DIAGNOSIS — O26.03 EXCESSIVE WEIGHT GAIN DURING PREGNANCY IN THIRD TRIMESTER: ICD-10-CM

## 2023-05-05 DIAGNOSIS — Z60.3 LANGUAGE BARRIER: ICD-10-CM

## 2023-05-05 DIAGNOSIS — Z75.8 LANGUAGE BARRIER: ICD-10-CM

## 2023-05-05 DIAGNOSIS — Z34.00 SUPERVISION OF NORMAL FIRST PREGNANCY, ANTEPARTUM: Primary | ICD-10-CM

## 2023-05-05 PROCEDURE — 99212 OFFICE O/P EST SF 10 MIN: CPT | Performed by: ADVANCED PRACTICE MIDWIFE

## 2023-05-05 SDOH — SOCIAL STABILITY - SOCIAL INSECURITY: ACCULTURATION DIFFICULTY: Z60.3

## 2023-05-05 NOTE — PROGRESS NOTES
Jean Paul presents to the clinic with Chor.  All is well!  Eager for baby's arrival!  Today, she is seen with the assistance of a Rivian Automotiveandrés  on the iPad.  Baby is active, and she denies regular uterine contractions, loss of fluid or vaginal bleeding.  She is taking both a prenatal vitamin and iron supplement at a different time of day.  Term labor precautions and danger signs and symptoms reviewed.  All questions answered.  Encouraged daily fetal movement counting and to call or return to clinic with any questions, concerns, or as needed.

## 2023-05-11 ENCOUNTER — PRENATAL OFFICE VISIT (OUTPATIENT)
Dept: MIDWIFE SERVICES | Facility: CLINIC | Age: 28
End: 2023-05-11
Payer: COMMERCIAL

## 2023-05-11 VITALS
HEIGHT: 62 IN | WEIGHT: 176 LBS | DIASTOLIC BLOOD PRESSURE: 70 MMHG | SYSTOLIC BLOOD PRESSURE: 106 MMHG | BODY MASS INDEX: 32.39 KG/M2 | HEART RATE: 76 BPM

## 2023-05-11 DIAGNOSIS — Z75.8 LANGUAGE BARRIER: ICD-10-CM

## 2023-05-11 DIAGNOSIS — Z34.00 SUPERVISION OF NORMAL FIRST PREGNANCY, ANTEPARTUM: Primary | ICD-10-CM

## 2023-05-11 DIAGNOSIS — Z60.3 LANGUAGE BARRIER: ICD-10-CM

## 2023-05-11 DIAGNOSIS — O26.03 EXCESSIVE WEIGHT GAIN DURING PREGNANCY IN THIRD TRIMESTER: ICD-10-CM

## 2023-05-11 PROCEDURE — 99212 OFFICE O/P EST SF 10 MIN: CPT | Performed by: ADVANCED PRACTICE MIDWIFE

## 2023-05-11 SDOH — SOCIAL STABILITY - SOCIAL INSECURITY: ACCULTURATION DIFFICULTY: Z60.3

## 2023-05-11 NOTE — PROGRESS NOTES
Sundar presents to the clinic with her  Chor.  The visit was conducted with the assistance of a professional telephone BookingBug .  All is well!  Patient denies regular uterine contractions, loss of fluid or vaginal bleeding.  Baby is active.  CNM details completed under pregnancy diagnosis.  GBS NEGATIVE and 36-week hemoglobin was 11.7 g/dL.  Term labor precautions and danger signs and symptoms reviewed.  All questions answered.  Encouraged daily fetal movement counting and to call or return to clinic with any questions, concerns, or as needed.

## 2023-05-18 ENCOUNTER — HOSPITAL ENCOUNTER (OUTPATIENT)
Dept: ULTRASOUND IMAGING | Facility: HOSPITAL | Age: 28
Discharge: HOME | End: 2023-05-18
Attending: ADVANCED PRACTICE MIDWIFE | Admitting: ADVANCED PRACTICE MIDWIFE
Payer: COMMERCIAL

## 2023-05-18 ENCOUNTER — HOSPITAL ENCOUNTER (INPATIENT)
Facility: HOSPITAL | Age: 28
LOS: 4 days | Discharge: HOME | End: 2023-05-22
Attending: ADVANCED PRACTICE MIDWIFE | Admitting: ADVANCED PRACTICE MIDWIFE
Payer: COMMERCIAL

## 2023-05-18 DIAGNOSIS — O36.8130 DECREASED FETAL MOVEMENTS IN THIRD TRIMESTER, SINGLE OR UNSPECIFIED FETUS: ICD-10-CM

## 2023-05-18 DIAGNOSIS — Z37.9 VACUUM-ASSISTED VAGINAL DELIVERY: ICD-10-CM

## 2023-05-18 PROBLEM — Z34.90 ENCOUNTER FOR INDUCTION OF LABOR: Status: ACTIVE | Noted: 2023-05-18

## 2023-05-18 PROBLEM — O48.0 POST-TERM PREGNANCY, 40-42 WEEKS OF GESTATION: Status: ACTIVE | Noted: 2023-05-18

## 2023-05-18 PROBLEM — O12.03 GESTATIONAL EDEMA IN THIRD TRIMESTER: Status: ACTIVE | Noted: 2023-05-18

## 2023-05-18 LAB
ABO/RH(D): NORMAL
ALBUMIN SERPL BCG-MCNC: 3.1 G/DL (ref 3.5–5.2)
ALP SERPL-CCNC: 203 U/L (ref 35–104)
ALT SERPL W P-5'-P-CCNC: 9 U/L (ref 10–35)
ANION GAP SERPL CALCULATED.3IONS-SCNC: 11 MMOL/L (ref 7–15)
ANTIBODY SCREEN: NEGATIVE
AST SERPL W P-5'-P-CCNC: 16 U/L (ref 10–35)
BILIRUB SERPL-MCNC: 0.3 MG/DL
BUN SERPL-MCNC: 8.6 MG/DL (ref 6–20)
CALCIUM SERPL-MCNC: 8.6 MG/DL (ref 8.6–10)
CHLORIDE SERPL-SCNC: 108 MMOL/L (ref 98–107)
CREAT SERPL-MCNC: 0.56 MG/DL (ref 0.51–0.95)
DEPRECATED HCO3 PLAS-SCNC: 18 MMOL/L (ref 22–29)
ERYTHROCYTE [DISTWIDTH] IN BLOOD BY AUTOMATED COUNT: 14 % (ref 10–15)
GFR SERPL CREATININE-BSD FRML MDRD: >90 ML/MIN/1.73M2
GLUCOSE SERPL-MCNC: 71 MG/DL (ref 70–99)
HCT VFR BLD AUTO: 35.3 % (ref 35–47)
HGB BLD-MCNC: 11.2 G/DL (ref 11.7–15.7)
HOLD SPECIMEN: NORMAL
MCH RBC QN AUTO: 27.5 PG (ref 26.5–33)
MCHC RBC AUTO-ENTMCNC: 31.7 G/DL (ref 31.5–36.5)
MCV RBC AUTO: 87 FL (ref 78–100)
PLATELET # BLD AUTO: 167 10E3/UL (ref 150–450)
POTASSIUM SERPL-SCNC: 3.9 MMOL/L (ref 3.4–5.3)
PROT SERPL-MCNC: 6.5 G/DL (ref 6.4–8.3)
RBC # BLD AUTO: 4.08 10E6/UL (ref 3.8–5.2)
SODIUM SERPL-SCNC: 137 MMOL/L (ref 136–145)
SPECIMEN EXPIRATION DATE: NORMAL
WBC # BLD AUTO: 11.1 10E3/UL (ref 4–11)

## 2023-05-18 PROCEDURE — 80053 COMPREHEN METABOLIC PANEL: CPT | Performed by: ADVANCED PRACTICE MIDWIFE

## 2023-05-18 PROCEDURE — 3E0P7VZ INTRODUCTION OF HORMONE INTO FEMALE REPRODUCTIVE, VIA NATURAL OR ARTIFICIAL OPENING: ICD-10-PCS | Performed by: ADVANCED PRACTICE MIDWIFE

## 2023-05-18 PROCEDURE — 120N000001 HC R&B MED SURG/OB

## 2023-05-18 PROCEDURE — 86592 SYPHILIS TEST NON-TREP QUAL: CPT | Performed by: ADVANCED PRACTICE MIDWIFE

## 2023-05-18 PROCEDURE — 36415 COLL VENOUS BLD VENIPUNCTURE: CPT | Performed by: ADVANCED PRACTICE MIDWIFE

## 2023-05-18 PROCEDURE — 76819 FETAL BIOPHYS PROFIL W/O NST: CPT

## 2023-05-18 PROCEDURE — 99222 1ST HOSP IP/OBS MODERATE 55: CPT | Performed by: ADVANCED PRACTICE MIDWIFE

## 2023-05-18 PROCEDURE — 86780 TREPONEMA PALLIDUM: CPT | Performed by: ADVANCED PRACTICE MIDWIFE

## 2023-05-18 PROCEDURE — 250N000013 HC RX MED GY IP 250 OP 250 PS 637: Performed by: ADVANCED PRACTICE MIDWIFE

## 2023-05-18 PROCEDURE — 86850 RBC ANTIBODY SCREEN: CPT | Performed by: ADVANCED PRACTICE MIDWIFE

## 2023-05-18 PROCEDURE — 85027 COMPLETE CBC AUTOMATED: CPT | Performed by: ADVANCED PRACTICE MIDWIFE

## 2023-05-18 RX ORDER — ONDANSETRON 4 MG/1
4 TABLET, ORALLY DISINTEGRATING ORAL EVERY 6 HOURS PRN
Status: DISCONTINUED | OUTPATIENT
Start: 2023-05-18 | End: 2023-05-20 | Stop reason: HOSPADM

## 2023-05-18 RX ORDER — FENTANYL CITRATE 50 UG/ML
50 INJECTION, SOLUTION INTRAMUSCULAR; INTRAVENOUS EVERY 30 MIN PRN
Status: DISCONTINUED | OUTPATIENT
Start: 2023-05-18 | End: 2023-05-20 | Stop reason: HOSPADM

## 2023-05-18 RX ORDER — NALOXONE HYDROCHLORIDE 0.4 MG/ML
0.4 INJECTION, SOLUTION INTRAMUSCULAR; INTRAVENOUS; SUBCUTANEOUS
Status: DISCONTINUED | OUTPATIENT
Start: 2023-05-18 | End: 2023-05-20 | Stop reason: HOSPADM

## 2023-05-18 RX ORDER — CARBOPROST TROMETHAMINE 250 UG/ML
250 INJECTION, SOLUTION INTRAMUSCULAR
Status: DISCONTINUED | OUTPATIENT
Start: 2023-05-18 | End: 2023-05-20 | Stop reason: HOSPADM

## 2023-05-18 RX ORDER — CITRIC ACID/SODIUM CITRATE 334-500MG
30 SOLUTION, ORAL ORAL
Status: DISCONTINUED | OUTPATIENT
Start: 2023-05-18 | End: 2023-05-20 | Stop reason: HOSPADM

## 2023-05-18 RX ORDER — PROCHLORPERAZINE MALEATE 10 MG
10 TABLET ORAL EVERY 6 HOURS PRN
Status: DISCONTINUED | OUTPATIENT
Start: 2023-05-18 | End: 2023-05-20 | Stop reason: HOSPADM

## 2023-05-18 RX ORDER — ACETAMINOPHEN 325 MG/1
650 TABLET ORAL EVERY 4 HOURS PRN
Status: DISCONTINUED | OUTPATIENT
Start: 2023-05-18 | End: 2023-05-20 | Stop reason: HOSPADM

## 2023-05-18 RX ORDER — KETOROLAC TROMETHAMINE 30 MG/ML
30 INJECTION, SOLUTION INTRAMUSCULAR; INTRAVENOUS
Status: DISCONTINUED | OUTPATIENT
Start: 2023-05-18 | End: 2023-05-22 | Stop reason: HOSPADM

## 2023-05-18 RX ORDER — METHYLERGONOVINE MALEATE 0.2 MG/ML
200 INJECTION INTRAVENOUS
Status: DISCONTINUED | OUTPATIENT
Start: 2023-05-18 | End: 2023-05-20 | Stop reason: HOSPADM

## 2023-05-18 RX ORDER — OXYTOCIN 10 [USP'U]/ML
10 INJECTION, SOLUTION INTRAMUSCULAR; INTRAVENOUS
Status: DISCONTINUED | OUTPATIENT
Start: 2023-05-18 | End: 2023-05-20 | Stop reason: HOSPADM

## 2023-05-18 RX ORDER — MORPHINE SULFATE 10 MG/ML
10 INJECTION, SOLUTION INTRAMUSCULAR; INTRAVENOUS
Status: COMPLETED | OUTPATIENT
Start: 2023-05-18 | End: 2023-05-19

## 2023-05-18 RX ORDER — LIDOCAINE HYDROCHLORIDE 10 MG/ML
INJECTION, SOLUTION EPIDURAL; INFILTRATION; INTRACAUDAL; PERINEURAL
Status: DISPENSED
Start: 2023-05-18 | End: 2023-05-19

## 2023-05-18 RX ORDER — ONDANSETRON 2 MG/ML
4 INJECTION INTRAMUSCULAR; INTRAVENOUS EVERY 6 HOURS PRN
Status: DISCONTINUED | OUTPATIENT
Start: 2023-05-18 | End: 2023-05-20 | Stop reason: HOSPADM

## 2023-05-18 RX ORDER — HYDROXYZINE HYDROCHLORIDE 50 MG/1
50 TABLET, FILM COATED ORAL
Status: DISCONTINUED | OUTPATIENT
Start: 2023-05-18 | End: 2023-05-19

## 2023-05-18 RX ORDER — OXYTOCIN 10 [USP'U]/ML
10 INJECTION, SOLUTION INTRAMUSCULAR; INTRAVENOUS
Status: DISCONTINUED | OUTPATIENT
Start: 2023-05-18 | End: 2023-05-22 | Stop reason: HOSPADM

## 2023-05-18 RX ORDER — IBUPROFEN 800 MG/1
800 TABLET, FILM COATED ORAL
Status: DISCONTINUED | OUTPATIENT
Start: 2023-05-18 | End: 2023-05-22 | Stop reason: HOSPADM

## 2023-05-18 RX ORDER — PROCHLORPERAZINE 25 MG
25 SUPPOSITORY, RECTAL RECTAL EVERY 12 HOURS PRN
Status: DISCONTINUED | OUTPATIENT
Start: 2023-05-18 | End: 2023-05-20 | Stop reason: HOSPADM

## 2023-05-18 RX ORDER — METOCLOPRAMIDE HYDROCHLORIDE 5 MG/ML
10 INJECTION INTRAMUSCULAR; INTRAVENOUS EVERY 6 HOURS PRN
Status: DISCONTINUED | OUTPATIENT
Start: 2023-05-18 | End: 2023-05-20 | Stop reason: HOSPADM

## 2023-05-18 RX ORDER — OXYTOCIN/0.9 % SODIUM CHLORIDE 30/500 ML
340 PLASTIC BAG, INJECTION (ML) INTRAVENOUS CONTINUOUS PRN
Status: DISCONTINUED | OUTPATIENT
Start: 2023-05-18 | End: 2023-05-20 | Stop reason: HOSPADM

## 2023-05-18 RX ORDER — TERBUTALINE SULFATE 1 MG/ML
0.25 INJECTION, SOLUTION SUBCUTANEOUS
Status: DISCONTINUED | OUTPATIENT
Start: 2023-05-18 | End: 2023-05-19

## 2023-05-18 RX ORDER — OXYTOCIN/0.9 % SODIUM CHLORIDE 30/500 ML
100-340 PLASTIC BAG, INJECTION (ML) INTRAVENOUS CONTINUOUS PRN
Status: DISCONTINUED | OUTPATIENT
Start: 2023-05-18 | End: 2023-05-22 | Stop reason: HOSPADM

## 2023-05-18 RX ORDER — MISOPROSTOL 200 UG/1
400 TABLET ORAL
Status: DISCONTINUED | OUTPATIENT
Start: 2023-05-18 | End: 2023-05-20 | Stop reason: HOSPADM

## 2023-05-18 RX ORDER — METOCLOPRAMIDE 10 MG/1
10 TABLET ORAL EVERY 6 HOURS PRN
Status: DISCONTINUED | OUTPATIENT
Start: 2023-05-18 | End: 2023-05-20 | Stop reason: HOSPADM

## 2023-05-18 RX ORDER — MISOPROSTOL 200 UG/1
800 TABLET ORAL
Status: DISCONTINUED | OUTPATIENT
Start: 2023-05-18 | End: 2023-05-20 | Stop reason: HOSPADM

## 2023-05-18 RX ORDER — NALOXONE HYDROCHLORIDE 0.4 MG/ML
0.2 INJECTION, SOLUTION INTRAMUSCULAR; INTRAVENOUS; SUBCUTANEOUS
Status: DISCONTINUED | OUTPATIENT
Start: 2023-05-18 | End: 2023-05-20 | Stop reason: HOSPADM

## 2023-05-18 RX ADMIN — DINOPROSTONE 10 MG: 10 INSERT VAGINAL at 17:36

## 2023-05-18 ASSESSMENT — ACTIVITIES OF DAILY LIVING (ADL)
DIFFICULTY_EATING/SWALLOWING: NO
WEAR_GLASSES_OR_BLIND: NO
ADLS_ACUITY_SCORE: 20
TOILETING_ISSUES: NO
DOING_ERRANDS_INDEPENDENTLY_DIFFICULTY: YES
DRESSING/BATHING_DIFFICULTY: NO
HEARING_DIFFICULTY_OR_DEAF: NO
FALL_HISTORY_WITHIN_LAST_SIX_MONTHS: NO
TOILETING: 0-->INDEPENDENT
CHANGE_IN_FUNCTIONAL_STATUS_SINCE_ONSET_OF_CURRENT_ILLNESS/INJURY: NO
COMMUNICATION: DIFFICULTY UNDERSTANDING;DIFFICULTY SPEAKING
TOILETING: 0-->INDEPENDENT
CONCENTRATING,_REMEMBERING_OR_MAKING_DECISIONS_DIFFICULTY: NO
ADLS_ACUITY_SCORE: 20
ADLS_ACUITY_SCORE: 35
ADLS_ACUITY_SCORE: 20
WALKING_OR_CLIMBING_STAIRS_DIFFICULTY: NO
ADLS_ACUITY_SCORE: 20
DIFFICULTY_COMMUNICATING: YES

## 2023-05-18 NOTE — PROGRESS NOTES
Writer walks into room, Flower MCNEAL and Юлия MCNEAL, utilizing Ipad  to discuss plan of care, SVE performed by Flower as documented, Flower and Юлия discuss options for cervical ripening with Nou, Nou chooses option for cervidil.

## 2023-05-18 NOTE — PROGRESS NOTES
"Labor Progress Note:    Assessment: 27 year old  @ 41w1d here for IOL for BPP 2/8 and postdates  - Fetal status: FHT Category 1 with moderate variability    Plan:   - Cervidil placed with ring forceps by Cynthia Jones CNM at 1720.   -Routine CNM support & management. Encourage position changes, ambulation, rest as desired.  -Anticipate progress and NSVB.   -Reevaluate progress in 12 hours or sooner with a change in status.       Subjective:  Nouxiong is coping well with change in plans for IOL today. Not feeling contractions. PO fluid intake.   Voiding without issue. Encouraged to order food.  Chor at bedside.    Objective:  Patient Vitals for the past 4 hrs:   BP Temp Pulse Resp Height Weight   23 1554 -- -- -- -- 1.562 m (5' 1.5\") 80.8 kg (178 lb 3.2 oz)   23 1524 124/82 99  F (37.2  C) 68 16 -- --       Membrane Status: Intact     Fetal Heart Rate:  FHR:Baseline: 140 bpm, Variability: Moderate (6 - 25 bpm), Accelerations: present and Decelerations: Absent    Uterine contractions:TocoFrequency: Every 3-10 minutes, Duration: 45 seconds and Intensity: mild    SVE: deferred      SARAH Gallego CNM        Total time spent with patient: 10 minutes, of which >50% time spent counseling and coordination of care.    2023 5:38 PM     "

## 2023-05-18 NOTE — H&P
Date: 2023  Time: 4:06 PM    Admission H&P  Jean Paul Goodman,  1995, MRN [id number]    [facility]   No admission diagnoses are documented for this encounter.    PCP: No Ref-Primary, Physician, None          Extended Emergency Contact Information  Primary Emergency Contact: yang chor  Home Phone: [phone]  Relation: Spouse       Chief Complaint: Post-term pregnancy, 40-42 weeks of gestation and decreased fetal movements       HPI:      Jean Paul Goodman, is a 27 year old,  at 41w1d, LMP 2022, Estimated Date of Delivery: May 10, 2023, confirmed by ultrasound at 10 weeks, admitted to [facility] on 2023 at 1600 secondary to: postdates with BPP 2/8 and decreased fetal movement. She initially presented to clinic for routine OB and noted decreased fetal movements. She had a reactive NST     Prenatal History:  Jean Paul Goodman began care with [facility] the  [facility] on 10/19/2022 at 11 weeks gestation with regular care thereafter for a total of 12 visits; she did have a lapse in care in the second trimester between 15-27 weeks GA. Her care was otherwise uncomplicated.        Pre-pregnant weight:  141 lbs   Pre-pregnant BMI: 26.21    Total weight gain:  37 lbs    Labs:  Prenatal Office Visit on 2023   Component Date Value Ref Range Status     Group B Strep PCR 2023 Negative  Negative Final    Presumed negative for Streptococcus agalactiae (Group B Streptococcus) or the number of organisms may be below the limit of detection of the assay.     Hemoglobin 2023 11.7  11.7 - 15.7 g/dL Final   Prenatal Office Visit on 02/15/2023   Component Date Value Ref Range Status     VZV Annel IgG Instrument Value 02/15/2023 1,439.0  <135.0 Index Final     Varicella Zoster Antibody IgG 02/15/2023 Positive   Final    Suggests previous exposure or immunization and probable immunity.     Glu Gest Screen 1hr  50g 02/15/2023 90  70 - 129 mg/dL Final     Treponema Antibody Total 02/15/2023 Nonreactive  Nonreactive Final     Hemoglobin 02/15/2023 11.8  11.7 - 15.7 g/dL Final     Hold Specimen 02/15/2023 JI   Final     Amphetamines Urine 02/15/2023 Screen Negative  Screen Negative Final    Cutoff for a negative amphetamine is less than 500 ng/mL.     Benzodiazepine Urine 02/15/2023 Screen Negative  Screen Negative Final    Cutoff for a negative benzodiazepine is less than 100 ng/mL.     Opiates Urine 02/15/2023 Screen Negative  Screen Negative Final    Cutoff for a negative opiate is less than 300 ng/mL.     PCP Urine 02/15/2023 Screen Negative  Screen Negative Final    Cutoff for a negative PCP is less than 25 ng/mL.     Cannabinoids Urine 02/15/2023 Screen Negative  Screen Negative Final    Cutoff for a negative cannabinoid is less than 50 ng/mL.     Barbituates Urine 02/15/2023 Screen Negative  Screen Negative Final    Cutoff for a negative barbiturate is less than 200 ng/mL.     Cocaine Urine 02/15/2023 Screen Negative  Screen Negative Final    Cutoff for a negative cocaine is less than 300 ng/mL.     Oxycodone Urine 02/15/2023 Screen Negative  Screen Negative Final    Cutoff for a negative oxycodone is less than 100 ng/mL.     Creatinine Urine mg/dL 02/15/2023 119.0  mg/dL Final   Prenatal Office Visit on 10/26/2022   Component Date Value Ref Range Status     Trichomonas 10/26/2022 Absent  Absent Final     Yeast 10/26/2022 Absent  Absent Final     Clue Cells 10/26/2022 Absent  Absent Final     WBCs/high power field 10/26/2022 1+ (A)  None Final     See Scanned Result 10/26/2022 INVITAE NON-INVASIVE PRENATAL SCREENING-Scanned   Final     Interpretation 10/26/2022 Negative for Intraepithelial Lesion or Malignancy (NILM)    Final     Comment 10/26/2022    Final                    Value:This result contains rich text formatting which cannot be displayed here.     Specimen Adequacy 10/26/2022 Satisfactory for evaluation,  endocervical/transformation zone component absent   Final     Clinical Information 10/26/2022    Final                    Value:This result contains rich text formatting which cannot be displayed here.     LMP/Menopause Date 10/26/2022    Final                    Value:This result contains rich text formatting which cannot be displayed here.     Reflex Testing 10/26/2022 Yes if ASCUS   Final     Previous Abnormal? 10/26/2022    Final                    Value:This result contains rich text formatting which cannot be displayed here.     Performing Labs 10/26/2022    Final                    Value:This result contains rich text formatting which cannot be displayed here.     Chlamydia Trachomatis 10/26/2022 Negative  Negative Final    Negative for C. trachomatis rRNA by transcription mediated amplification.   A negative result by transcription mediated amplification does not preclude the presence of infection because results are dependent on proper and adequate collection, absence of inhibitors and sufficient rRNA to be detected.     Neisseria gonorrhoeae 10/26/2022 Negative  Negative Final    Negative for N. gonorrhoeae rRNA by transcription mediated amplification. A negative result by transcription mediated amplification does not preclude the presence of C. trachomatis infection because results are dependent on proper and adequate collection, absence of inhibitors and sufficient rRNA to be detected.   Prenatal Office Visit on 10/19/2022   Component Date Value Ref Range Status     Culture 10/19/2022 No Growth   Final     ABO/RH(D) 10/19/2022 O POS   Final     SPECIMEN EXPIRATION DATE 10/19/2022 [id number]   Final     Antibody Screen 10/19/2022 Negative  Negative Final     SPECIMEN EXPIRATION DATE 10/19/2022 [id number]   Final     WBC Count 10/19/2022 9.9  4.0 - 11.0 10e3/uL Final     RBC Count 10/19/2022 4.01  3.80 - 5.20 10e6/uL Final     Hemoglobin 10/19/2022 11.7  11.7 - 15.7 g/dL Final     Hematocrit  "10/19/2022 34.3 (L)  35.0 - 47.0 % Final     MCV 10/19/2022 86  78 - 100 fL Final     MCH 10/19/2022 29.2  26.5 - 33.0 pg Final     MCHC 10/19/2022 34.1  31.5 - 36.5 g/dL Final     RDW 10/19/2022 12.9  10.0 - 15.0 % Final     Platelet Count 10/19/2022 251  150 - 450 10e3/uL Final     Rubella Annel IgG Instrument Value 10/19/2022 18.70  <0.90 Index Final     Rubella Antibody IgG 10/19/2022 Positive   Final    Suggests previous exposure or immunization and probable immunity.     Treponema Antibody Total 10/19/2022 Nonreactive  Nonreactive Final     Hepatitis B Surface Antigen 10/19/2022 Nonreactive  Nonreactive Final     Hepatitis C Antibody 10/19/2022 Nonreactive  Nonreactive Final     HIV Antigen Antibody Combo 10/19/2022 Nonreactive  Nonreactive Final    HIV-1 p24 Ag & HIV-1/HIV-2 Ab Not Detected     Vitamin D, Total (25-Hydroxy) 10/19/2022 17 (L)  20 - 75 ug/L Final     Lead Venous Blood 10/19/2022 <2.0  <=4.9 ug/dL Final    Comment: INTERPRETIVE INFORMATION: Lead, Blood (Venous)    Analysis performed by Inductively Coupled Plasma-Mass   Spectrometry (ICP-MS).    Elevated results may be due to skin or collection-related   contamination, including the use of a noncertified   lead-free tube. If contamination concerns exist due to   elevated levels of blood lead, confirmation with a second   specimen collected in a certified lead-free tube is   recommended.    Information sources for blood lead reference intervals and   interpretive comments include the CDC's \"Childhood Lead   Poisoning Prevention: Recommended Actions Based on Blood   Lead Level\" and the \"Adult Blood Lead Epidemiology and   Surveillance: Reference Blood Lead Levels (BLLs) for Adults   in the [place].\" Thresholds and time intervals for retesting,   medical evaluation, and response vary by state and   regulatory body. Contact your State Department of Health   and/or applicable regulatory agency for specific guidance   on medical management                    "         recommendations.    This test was developed and its performance characteristics   determined by [facility]. It has not been cleared or   approved by the U.S. Food and Drug Administration. This   test was performed in a CLIA-certified laboratory and is   intended for clinical purposes.         Group          Concentration   Comment    Children       3.5-19.9 ug/dL  Children under the age of 6                                 years are the most vulnerable                                 to the harmful effects of                                  lead exposure. Environmental                                  investigation and exposure                                  history to identify potential                                 sources of lead. Biological                                  and nutritional monitoring                                 are recommended. Follow-up                                  blood lead monitoring is                                  recommended.                                                           20-44.9 ug/dL   Lead hazard reduction and                                  prompt medical evaluation are                                 recommended. Contact a                                  Pediatric Environmental                                  Health Specialty Unit or                                  poison control center for                                  guidance.                   Greater than    Critical. Immediate medical                  44.9 ug/dL      evaluation, including                                  detailed neurological exam is                                 recommended. Consider                                  chelation therapy when                                 symptoms of lead toxicity   are                                  present. Contact a Pediatric                                  Environmental Health                                  Specialty  Unit or poison                                  control center for                                                             assistance.    Adult          5-19.9 ug/dL    Medical removal is                                  recommended for pregnant                                  women or those who are trying                                 or may become pregnant.                                  Adverse health effects are                                  possible. Reduced lead                                  exposure and increased blood                                  lead monitoring are                                  recommended.                    20-69.9 ug/dL   Adverse health effects are                                  indicated. Medical removal                                  from lead exposure is                                  required by OSHA if blood                                  lead level exceeds 50 ug/dL.                                 Prompt medical evaluation is                                 recommended.                    Greater than    Critical. Immediate medical                                             69.9 ug/dL      evaluation is recommended.                                  Consider chelation therapy                                 when symptoms of lead                                  toxicity are present.  Performed By: [facility] (Closed)  [address] [place] [id number]  : Breezy Restrepo MD, PhD     Glu Gest Screen 1hr 50g 10/19/2022 124  70 - 129 mg/dL Final     Hemoglobin A2 10/20/2022 3.0  2.0 - 3.5 % Final     Hemoglobin F 10/20/2022 0.8  0.0 - 2.1 % Final    REFERENCE INTERVAL: Hemoglobin F    Access complete set of age- and/or gender-specific   reference intervals for this test in the [facility]   Test Directory ([email or web address]).     Hemoglobin S 10/20/2022 0.0  0.0 - 0.0 % Final     Hemoglobin C 10/20/2022 0.0  0.0 - 0.0 % Final      Hemoglobin E 10/20/2022 0.0  0.0 - 0.0 % Final     Hemoglobin A 10/20/2022 96.2  95.0 - 97.9 % Final     Hemoglobin - Other 10/20/2022 0.0  0.0 - 0.0 % Final     Hemoglobin Evaluation 10/20/2022 See Note   Final       Impression: Normal HPLC evaluation.     This normal HPLC result does not rule out the possibility   of alpha globin gene deletions associated with silent   carrier status or alpha thalassemia trait. Individuals who   carry a rare, Greek beta thalassemia variant often have a   normal Hb A2 and may not be identified by this assay.   Please correlate with clinical and laboratory findings.     Sickle Cell Solubility 10/20/2022 Not Performed   Final     Hemoglobin, Capillary Electrophore* 10/20/2022 Not Performed   Final    Performed By: [email or web address]  [address] [place] [id number]  : Breezy Restrepo MD, PhD       Pertinent Radiology:  [facility] 23  IMPRESSION:  1.  Single living intrauterine gestation in vertex/cephalic presentation.  2.  Abnormal  biophysical profile. Fetal breathing movement, gross body movement and fetal tone were all abnormal.       OB HISTORY  OB History    Para Term  AB Living   1 0 0 0 0 0   SAB IAB Ectopic Multiple Live Births   0 0 0 0 0      # Outcome Date GA Lbr Jose/2nd Weight Sex Delivery Anes PTL Lv   1 Current                  Medical History  History reviewed. No pertinent past medical history.      Surgical History  She  has a past surgical history that includes no history of surgery.       Social History  Reviewed, and she  reports that she has never smoked. She has never been exposed to tobacco smoke. She has never used smokeless tobacco. She reports that she does not drink alcohol and does not use drugs.  Partner: Bala  Education level: High school  Occupation: [employer]      Family History  Reviewed, and family history includes No Known Problems in her brother, brother, brother, brother, father, maternal grandfather,  "maternal grandmother, mother, paternal grandfather, paternal grandmother, sister, sister, and sister.          No Known Allergies   Medications Prior to Admission   Medication Sig Dispense Refill Last Dose     cyanocobalamin (CYANOCOBALAMIN) 1000 MCG tablet Take 1 tablet (1,000 mcg) by mouth daily 60 tablet 1 Past Week     ferrous sulfate (SLO-FE) 142 (45 Fe) MG CR tablet Take 1 tablet (142 mg) by mouth daily 60 tablet 1 Past Week     Prenatal Vit-Fe Fumarate-FA (PRENATAL MULTIVITAMIN W/IRON) 27-0.8 MG tablet Take 1 tablet by mouth daily 90 tablet 3 Past Week     vitamin C (ASCORBIC ACID) 250 MG tablet Take 1 tablet (250 mg) by mouth daily 60 tablet 1 Past Week     vitamin D3 (CHOLECALCIFEROL) 50 mcg (2000 units) tablet Take 2 tablets (100 mcg) by mouth daily 60 tablet 1 Past Week        Review of Systems:  Pertinent items are noted in HPI.   Physical Exam:  Temp:  [99  F (37.2  C)] 99  F (37.2  C)  Pulse:  [68-80] 68  Resp:  [16] 16  BP: (114-124)/(76-82) 124/82    /82 (BP Location: Right arm, Patient Position: Semi-Brady's, Cuff Size: Adult Regular)   Pulse 68   Temp 99  F (37.2  C)   Resp 16   Ht 1.562 m (5' 1.5\")   Wt 80.8 kg (178 lb 3.2 oz)   LMP 08/03/2022 (Exact Date)   BMI 33.13 kg/m      Height: 5' 1.5\"  General appearance:  comfortable  Psych: AAO x3  Skin: Pink, warm & dry  HEENT: unremarkable  Cardiovascular:  RRR, S1, S2, no extra sounds or murmurs  Respiratory:  breath sounds CTA bilaterally, anteriorly and posteriorly  Abdomen: soft, NT, gravid  Leopolds: vertex         EFW:<4500 grams (AGA)     FHR:Baseline: 140 bpm, Variability: Moderate (6 - 25 bpm), Accelerations: present and Decelerations: Absent    Uterine contractions: Frequency: Every 6-8 minutes, Duration: 45 seconds and Intensity: mild    SVE:Dilation of Cervix:  0        Score = 0  Effacement:  40-50%;   Score = 1  Consistency:  Average;   Score = 1  Position:  Mid;   Score = 1  Station:  -3;  Score = 0    Extremeties: +2 " edema        Membrane Status: intact        Notable AP/IP factors to date:  1.) Normal pregnancy course  2.) Post-dates testing today with reactive NST ad BPP 2/8  3.) Reviewed options for cervical ripening including PO cytotec or PV cervidil. Nou opts for cervidil.    Impression:  Jean Paul Goodman is a 27 year old year old at 41w1d weeks, Category 1 FHTs, IOL for postdates.      Plan:  1.  [facility]  2.  Encourage position changes  3.  Labor support PRN.  4.  Continuous fetal monitoring  5.  Anticipate Spontaneous vaginal birth  6. CNM to place cervidil after pt is admitted.     Total time with patient:  40 minutes at bedside and in the [facility] obtaining and clarifying the above history, performing the physical exam, education and counseling and developing this plan of care.  >50% spent on counseling and coordination of care.    Visit conducted with ipad [employer] interpretor.     Provider: SARAH Gallego CNM, ELIZABET SMITH

## 2023-05-19 ENCOUNTER — ANESTHESIA (OUTPATIENT)
Dept: OBGYN | Facility: HOSPITAL | Age: 28
End: 2023-05-19
Payer: COMMERCIAL

## 2023-05-19 ENCOUNTER — ANESTHESIA EVENT (OUTPATIENT)
Dept: OBGYN | Facility: HOSPITAL | Age: 28
End: 2023-05-19
Payer: COMMERCIAL

## 2023-05-19 LAB
FLUAV RNA SPEC QL NAA+PROBE: NEGATIVE
FLUBV RNA RESP QL NAA+PROBE: NEGATIVE
RPR SER QL: NONREACTIVE
RSV RNA SPEC NAA+PROBE: NEGATIVE
SARS-COV-2 RNA RESP QL NAA+PROBE: POSITIVE
T PALLIDUM AB SER QL: REACTIVE

## 2023-05-19 PROCEDURE — 258N000003 HC RX IP 258 OP 636: Performed by: ADVANCED PRACTICE MIDWIFE

## 2023-05-19 PROCEDURE — 250N000013 HC RX MED GY IP 250 OP 250 PS 637: Performed by: ADVANCED PRACTICE MIDWIFE

## 2023-05-19 PROCEDURE — 250N000011 HC RX IP 250 OP 636: Performed by: ANESTHESIOLOGY

## 2023-05-19 PROCEDURE — 87637 SARSCOV2&INF A&B&RSV AMP PRB: CPT | Performed by: ADVANCED PRACTICE MIDWIFE

## 2023-05-19 PROCEDURE — 258N000003 HC RX IP 258 OP 636: Performed by: ANESTHESIOLOGY

## 2023-05-19 PROCEDURE — 250N000009 HC RX 250: Performed by: ADVANCED PRACTICE MIDWIFE

## 2023-05-19 PROCEDURE — 120N000001 HC R&B MED SURG/OB

## 2023-05-19 PROCEDURE — 250N000011 HC RX IP 250 OP 636: Performed by: ADVANCED PRACTICE MIDWIFE

## 2023-05-19 PROCEDURE — 00HU33Z INSERTION OF INFUSION DEVICE INTO SPINAL CANAL, PERCUTANEOUS APPROACH: ICD-10-PCS | Performed by: ANESTHESIOLOGY

## 2023-05-19 PROCEDURE — 3E0R3BZ INTRODUCTION OF ANESTHETIC AGENT INTO SPINAL CANAL, PERCUTANEOUS APPROACH: ICD-10-PCS | Performed by: ANESTHESIOLOGY

## 2023-05-19 PROCEDURE — 370N000003 HC ANESTHESIA WARD SERVICE: Performed by: ANESTHESIOLOGY

## 2023-05-19 RX ORDER — SODIUM CHLORIDE, SODIUM LACTATE, POTASSIUM CHLORIDE, CALCIUM CHLORIDE 600; 310; 30; 20 MG/100ML; MG/100ML; MG/100ML; MG/100ML
INJECTION, SOLUTION INTRAVENOUS CONTINUOUS PRN
Status: DISCONTINUED | OUTPATIENT
Start: 2023-05-19 | End: 2023-05-20 | Stop reason: HOSPADM

## 2023-05-19 RX ORDER — LIDOCAINE 40 MG/G
CREAM TOPICAL
Status: DISCONTINUED | OUTPATIENT
Start: 2023-05-19 | End: 2023-05-20 | Stop reason: HOSPADM

## 2023-05-19 RX ORDER — FENTANYL/ROPIVACAINE/NS/PF 2MCG/ML-.1
PLASTIC BAG, INJECTION (ML) EPIDURAL
Status: DISCONTINUED | OUTPATIENT
Start: 2023-05-19 | End: 2023-05-20 | Stop reason: HOSPADM

## 2023-05-19 RX ORDER — SODIUM CHLORIDE, SODIUM LACTATE, POTASSIUM CHLORIDE, CALCIUM CHLORIDE 600; 310; 30; 20 MG/100ML; MG/100ML; MG/100ML; MG/100ML
INJECTION, SOLUTION INTRAVENOUS CONTINUOUS
Status: DISCONTINUED | OUTPATIENT
Start: 2023-05-19 | End: 2023-05-22 | Stop reason: HOSPADM

## 2023-05-19 RX ORDER — BUPIVACAINE HYDROCHLORIDE 2.5 MG/ML
INJECTION, SOLUTION EPIDURAL; INFILTRATION; INTRACAUDAL
Status: COMPLETED | OUTPATIENT
Start: 2023-05-19 | End: 2023-05-19

## 2023-05-19 RX ORDER — OXYTOCIN/0.9 % SODIUM CHLORIDE 30/500 ML
1-24 PLASTIC BAG, INJECTION (ML) INTRAVENOUS CONTINUOUS
Status: DISCONTINUED | OUTPATIENT
Start: 2023-05-19 | End: 2023-05-20 | Stop reason: HOSPADM

## 2023-05-19 RX ORDER — FENTANYL CITRATE-0.9 % NACL/PF 10 MCG/ML
100 PLASTIC BAG, INJECTION (ML) INTRAVENOUS EVERY 5 MIN PRN
Status: DISCONTINUED | OUTPATIENT
Start: 2023-05-19 | End: 2023-05-20 | Stop reason: HOSPADM

## 2023-05-19 RX ORDER — DIPHENHYDRAMINE HYDROCHLORIDE 50 MG/ML
25 INJECTION INTRAMUSCULAR; INTRAVENOUS EVERY 6 HOURS PRN
Status: DISCONTINUED | OUTPATIENT
Start: 2023-05-19 | End: 2023-05-22 | Stop reason: HOSPADM

## 2023-05-19 RX ORDER — NALBUPHINE HYDROCHLORIDE 20 MG/ML
2.5-5 INJECTION, SOLUTION INTRAMUSCULAR; INTRAVENOUS; SUBCUTANEOUS EVERY 6 HOURS PRN
Status: DISCONTINUED | OUTPATIENT
Start: 2023-05-19 | End: 2023-05-22 | Stop reason: HOSPADM

## 2023-05-19 RX ADMIN — SODIUM CHLORIDE, POTASSIUM CHLORIDE, SODIUM LACTATE AND CALCIUM CHLORIDE: 600; 310; 30; 20 INJECTION, SOLUTION INTRAVENOUS at 06:41

## 2023-05-19 RX ADMIN — SODIUM CHLORIDE, POTASSIUM CHLORIDE, SODIUM LACTATE AND CALCIUM CHLORIDE 500 ML: 600; 310; 30; 20 INJECTION, SOLUTION INTRAVENOUS at 14:10

## 2023-05-19 RX ADMIN — HYDROXYZINE HYDROCHLORIDE 50 MG: 50 TABLET, FILM COATED ORAL at 02:16

## 2023-05-19 RX ADMIN — BUPIVACAINE HYDROCHLORIDE 5 ML: 2.5 INJECTION, SOLUTION EPIDURAL; INFILTRATION; INTRACAUDAL at 15:37

## 2023-05-19 RX ADMIN — FENTANYL CITRATE 50 MCG: 50 INJECTION, SOLUTION INTRAMUSCULAR; INTRAVENOUS at 13:56

## 2023-05-19 RX ADMIN — SODIUM CHLORIDE, POTASSIUM CHLORIDE, SODIUM LACTATE AND CALCIUM CHLORIDE: 600; 310; 30; 20 INJECTION, SOLUTION INTRAVENOUS at 12:54

## 2023-05-19 RX ADMIN — DIPHENHYDRAMINE HYDROCHLORIDE 25 MG: 50 INJECTION, SOLUTION INTRAMUSCULAR; INTRAVENOUS at 23:00

## 2023-05-19 RX ADMIN — FENTANYL CITRATE 50 MCG: 50 INJECTION, SOLUTION INTRAMUSCULAR; INTRAVENOUS at 14:07

## 2023-05-19 RX ADMIN — Medication: at 15:28

## 2023-05-19 RX ADMIN — ACETAMINOPHEN 650 MG: 325 TABLET ORAL at 18:32

## 2023-05-19 RX ADMIN — SODIUM CHLORIDE, POTASSIUM CHLORIDE, SODIUM LACTATE AND CALCIUM CHLORIDE 250 ML: 600; 310; 30; 20 INJECTION, SOLUTION INTRAVENOUS at 18:31

## 2023-05-19 RX ADMIN — Medication 1 MILLI-UNITS/MIN: at 11:15

## 2023-05-19 RX ADMIN — MORPHINE SULFATE 10 MG: 10 INJECTION INTRAVENOUS at 03:12

## 2023-05-19 ASSESSMENT — ACTIVITIES OF DAILY LIVING (ADL)
ADLS_ACUITY_SCORE: 20

## 2023-05-19 NOTE — PROGRESS NOTES
"Labor Progress Note:    Patient Name:  Jean Paul Goodman  :      1995  MRN:      [id number]      Professional in-person [facility]  used to conduct several visits with patient.  All staff in full PPE since diagnosis of COVID.       Subjective:  Nou continues to be very uncomfortable.  It is a mix of the pain of contractions, and her extreme fatigue from lack of sleep last night.  Has mostly rested in bed, but agreed to get up and move around the room for an hour at CNM request (sat toilet, walked in room, rocked in rocking chair).      After the hour of movement is requesting pain relief.  Interested in starting with Fentanyl, but desires epidural if doesn't get enough relief.  Spouse Chor is reluctant about the epidural.  Asking questions about how it is placed, how it works, long-term side effects or risks.  At end of conversation Chor \"ok's\" Nou's ability to get an epidural if she desires.      Voiding without issue. Declining lunch as in too much pain (chicken/rice meal and fruit moved to fridge).  Chor at bedside.        Objective:  Temp:  [98.2  F (36.8  C)-99.1  F (37.3  C)] 98.8  F (37.1  C)  Pulse:  [66-82] 70  Resp:  [16-20] 20  BP: (118-141)/(63-83) 136/73  SpO2:  [93 %-99 %] 97 %  /73   Pulse 70   Temp 98.8  F (37.1  C) (Oral)   Resp 20   Ht 1.562 m (5' 1.5\")   Wt 80.8 kg (178 lb 3.2 oz)   LMP 2022 (Exact Date)   SpO2 97%   BMI 33.13 kg/m      FHR: 135, mod variability, accels present, no decels.   Contractions: q 3-6 minutes for stronger contractions and irritability between, mild-mod to palpation.  Cervix: exam deferred at this time.  Other: Breathing and moaning lightly with contractions.  Appears very fatigued.  Pitocin:  5mu/min      Assessment:    -  at 41.2 weeks  - IOL for post-dates testing with BPP 2/8 and reactive NST (total score 4/10)    Cervical ripening = cervidil.      SROM with subsequent stronger spontaneous contractions.      Pitocin " "augmentation started 1115.  - Maternal fatigue, which is contributing to difficulty coping with pain of labor  - Meconium stained fluid - ROM X 9 hours  - COVID positive - diagnosed TODAY (new mild intermittent cough started today).  - Category I FHTs      Plan:   - IV Fentanyl given with some relief of pain (particularly relaxation between contractions is more evident).  After discussion with spouse Chor and him \"OKing\" the option of an epidural, patient may choose to pursue this after fentanyl wears off.  She will try to rest now.  In right lateral position with peanut ball.  -Routine CNM support & management. Encourage position changes, ambulation, rest as desired.  -Anticipate progress         Total time spent with patient: 70 minutes, of which >50% time spent counseling and coordination of care.      Provider:  SARAH Bragg/ELIZABET        Date:  5/19/2023  Time:  2:25 PM        "

## 2023-05-19 NOTE — PROGRESS NOTES
"Labor Progress Note:    Patient Name:  Jean Paul Goodman  :      1995  MRN:      [id number]      Professional [employer]  used to conduct this visit      Subjective:  \"Nou\" is coping well with contractions, breathing and moaning quietly.  States has been uncomfortable since last evening, but the pain increased significantly when her water broke at 0520.  She was able to get a few hours of relatively solid sleep after po Vistaril / IM morphine were administered half way through the night, but since water broke she isn't able to sleep any longer.    Was up on the ball for a little while and moving around room.  Now, just wants to lay in bed to see if can rest in-between contractions.  Declines getting up to tub or shower to help with pain management at this time.  Plans an unmedicated birth.    Spouse Chor at bedside and supportive.      Objective:  Temp:  [98.6  F (37  C)-99  F (37.2  C)] 98.6  F (37  C)  Pulse:  [66-82] 70  Resp:  [16-18] 17  BP: (114-141)/(63-83) 120/63  SpO2:  [93 %-99 %] 96 %  /63 (BP Location: Right arm, Patient Position: Semi-Brady's, Cuff Size: Adult Regular)   Pulse 70   Temp 98.6  F (37  C) (Oral)   Resp 17   Ht 1.562 m (5' 1.5\")   Wt 80.8 kg (178 lb 3.2 oz)   LMP 2022 (Exact Date)   SpO2 96%   BMI 33.13 kg/m      FHR: 127, mod variability, accels present, no decels.   Contractions: q 2-7 minutes, mild-mod to palpation.  Seems to have some coupling from how she is reacting to contractions.  Contraction pattern not picking up well with EFM.  Cervix: cervical exam deferred at this time   (*recent exam by RN at 0522 = 1 / 50-60% / -2 / medium consistency, anterior, cephalic, blood show present, small meconium stained fluid noted following SROM)  Other: Nou is breathing with contractions, moaning lightly, and obviously uncomfortable.  Fatigued as well, and morphine/vistaril effects still present which is adding to fatigue.       Assessment:    -  " at 41.2 weeks  - IOL for post-dates testing with BPP 2/8 and reactive NST (total score 4/10)  - Cervical ripening with cervidil last night.  Cervidil spontaneously feel out with SROM at 0520 this morning.  - SROM with meconium stained fluid 0520 - now 2+ hours post ROM  - Category I FHTs  - GBS negative, O+, rubella immune      Plan:   - Discussed CNMs recommendation of expectant management at this time as Nou has become so uncomfortable since SROM at 0520.  Discussed typical progression of early labor, and how I'd be hopeful that her cervix will change some over the next several hours, ripening on its own.  Encouraged to eat, hydrate, and alternate between rest and movement this morning.  Discussed ideas for non-medication coping.  Plan reevaluation later in the morning, or sooner PRN  - Moving monitors from traditional external monitor to Ermelinda as contractions not picking up well.  This will also allow Nou more flexibility to move.  -Routine CNM support & management.   -Anticipate progress.         Total time spent with patient: 30 minutes, of which >50% time spent counseling and coordination of care.        Provider:  SARAH Bragg/ELIZABET        Date:  5/19/2023  Time:  7:25 AM

## 2023-05-19 NOTE — PLAN OF CARE
Problem: Labor  Goal: Acceptable Pain Control  Outcome: Progressing   Goal Outcome Evaluation:                    Dr Cast at bedside at 1515 for labor epidural. Test dose given at 1528. Patient repositioned to right lateral  @ 1537. Patient rating contractions at 0 out of 10.    Rafaela Cunningham RN  5/19/2023 3:51 PM

## 2023-05-19 NOTE — PROGRESS NOTES
Labor Progress Note:    Patient Name:  Jean Paul Goodman  :      1995  MRN:      [id number]        COVID test came back POSITIVE.  Will put patient in isolation now.    Has had exposure to evening staff, night staff, and daytime staff without wearing a mask.        Provider:  MANDA Bragg          Date:  2023  Time:  12:13 PM

## 2023-05-19 NOTE — PROGRESS NOTES
"Labor Progress Note:    Patient Name:  Jean Paul Goodman  :      1995  MRN:      [id number]      Professional in-person [facility]  used to conduct several visits with patient.    All staff in full PPE since diagnosis of COVID.       Subjective:  Nou is very comfortable now.  Getting much needed rest!      Objective:  Temp:  [98.2  F (36.8  C)-99.1  F (37.3  C)] 98.5  F (36.9  C)  Pulse:  [66-82] 70  Resp:  [16-20] 18  BP: (101-161)/(55-92) 114/56  SpO2:  [93 %-99 %] 97 %  /56   Pulse 70   Temp 98.5  F (36.9  C)   Resp 18   Ht 1.562 m (5' 1.5\")   Wt 80.8 kg (178 lb 3.2 oz)   LMP 2022 (Exact Date)   SpO2 97%   BMI 33.13 kg/m      FHR: 140, min-mod variability following decel but had been moderate prior to it, accels present, prolonged decel to christal 85 X 3.5 minutes, and 1 other decel christal 110 X1 minute.  Contractions: q 1-3 minutes, mild-mod to palpation.  Cervix: On initial exam is 4cm, 90% around 3/4 of cervix except for anterior lip area which is swollen at 70%, +1, anterior, soft, cephalic.  Full internal monitors placed (IUPC at 2-3:00 position with ease, and FSE.with ease), and following placement of monitors cervix is now 5 cm from the manipulation.  Other: Very comfortable, peaceful seeming now  Pitocin:  9 mu/min until prolonged decel when pitocin stopped.  Will plan to restart once Category I again.  Barboza:  Placed by CNM       Assessment:    -  at 41.2 weeks currently in early labor following IOL for post-dates testing with BPP 2/8 and reactive NST (total score 4/10)    Cervical ripening with cervidil.      SROM with subsequent stronger spontaneous contractions.      Pitocin augmentation now  - Meconium stained fluid - ROM X nearly 12 hours  - COVID positive - diagnosed TODAY (new mild intermittent cough started today).  - Category II FHTs  - Epidural with great comfort achieved      Plan:   - CNM talked with Nou and Chor about recommendation for placement of " IUPC to help guide pitocin augmentation more effectively.  Explained procedure, reasoning for the recommendation, risks/benefits.  The couple agrees to proceed.  While discussion happening about IUPC prolonged FHR decel noted.  Position changes instituted with improvement of FHR.  CNM then talked with the couple about placement of FSE as well.  The couple also agrees to this intervention. Therefore full internals placed, and Ermelinda monitor removed.    - Barboza cath placed  - Plan to restart pitocin once FHR Category I again.  Will advance pitocin to achieve adequate labor per guidelines/orders.  -Routine CNM support & management.   -Anticipate progress       Midwives Paulette Awad and Flower Carreno will assume care at 1800      Total time spent with patient: 30 minutes, of which >50% time spent counseling and coordination of care.      Provider:  MANDA Bragg        Date:  5/19/2023  Time:  4:59 PM

## 2023-05-19 NOTE — ANESTHESIA PREPROCEDURE EVALUATION
Anesthesia Pre-Procedure Evaluation    Patient: Jean Paul Goodman   MRN: [id number] : 1995        Procedure :           History reviewed. No pertinent past medical history.   Past Surgical History:   Procedure Laterality Date     NO HISTORY OF SURGERY        No Known Allergies   Social History     Tobacco Use     Smoking status: Never     Passive exposure: Never     Smokeless tobacco: Never   Vaping Use     Vaping status: Not on file   Substance Use Topics     Alcohol use: Never      Wt Readings from Last 1 Encounters:   23 80.8 kg (178 lb 3.2 oz)        Anesthesia Evaluation   Pt has had prior anesthetic.         ROS/MED HX  ENT/Pulmonary: Comment: COVID positive      Neurologic:       Cardiovascular:       METS/Exercise Tolerance:     Hematologic:  - neg hematologic  ROS     Musculoskeletal:       GI/Hepatic:       Renal/Genitourinary:       Endo:  - neg endo ROS     Psychiatric/Substance Use:       Infectious Disease:       Malignancy:       Other:            Physical Exam    Airway        Mallampati: II   TM distance: > 3 FB   Neck ROM: full   Mouth opening: > 3 cm    Respiratory Devices and Support         Dental           Cardiovascular             Pulmonary                   OUTSIDE LABS:  CBC:   Lab Results   Component Value Date    WBC 11.1 (H) 2023    WBC 9.9 10/19/2022    HGB 11.2 (L) 2023    HGB 11.7 2023    HCT 35.3 2023    HCT 34.3 (L) 10/19/2022     2023     10/19/2022     BMP:   Lab Results   Component Value Date     2023    POTASSIUM 3.9 2023    CHLORIDE 108 (H) 2023    CO2 18 (L) 2023    BUN 8.6 2023    CR 0.56 2023    GLC 71 2023     COAGS: No results found for: PTT, INR, FIBR  POC: No results found for: BGM, HCG, HCGS  HEPATIC:   Lab Results   Component Value Date    ALBUMIN 3.1 (L) 2023    PROTTOTAL 6.5 2023    ALT 9 (L) 2023    AST 16 2023    ALKPHOS 203 (H)  05/18/2023    BILITOTAL 0.3 05/18/2023     OTHER:   Lab Results   Component Value Date    LIANNA 8.6 05/18/2023       Anesthesia Plan    ASA Status:  3      Anesthesia Type: Epidural.              Consents    Anesthesia Plan(s) and associated risks, benefits, and realistic alternatives discussed. Questions answered and patient/representative(s) expressed understanding.    - Discussed:     - Discussed with:  Patient         Postoperative Care            Comments:    Other Comments: I performed this patient s epidural which required substantially increased work beyond that of the typical epidural based on the need to use COVID-19 precaution which required increased time and skill employed through the use of personal protective equipment in preparing for and during the epidural as well as the additional time spent properly disposing of the equipment upon completion of the procedure.            Nathalie Cast MD

## 2023-05-19 NOTE — ANESTHESIA PROCEDURE NOTES
"Epidural catheter Procedure Note    Pre-Procedure   Staff -        Anesthesiologist:  Nathalie Cast MD       Performed By: anesthesiologist       Location: pre-op       Pre-Anesthestic Checklist: patient identified, IV checked, risks and benefits discussed, informed consent, monitors and equipment checked, pre-op evaluation, at physician/surgeon's request and post-op pain management  Timeout:       Correct Patient: Yes        Correct Procedure: Yes        Correct Site: Yes        Correct Position: Yes   Procedure Documentation  Procedure: epidural catheter       Patient Position: sitting       Patient Prep/Sterile Barriers: sterile gloves, mask, patient draped       Skin prep: Chloraprep       Insertion Site: L3-4. (midline approach).       Technique: LORT saline        Needle Type: Touhy needle       Needle Gauge: 18.        Needle Length (Inches): 5        Catheter: 19 G.          Catheter threaded easily.           Threaded 12 cm at skin.         # of attempts: 1 and  # of redirects:  0    Assessment/Narrative         Paresthesias: No.       Test dose of 5 mL lidocaine 1.5% w/ 1:200,000 epinephrine at 13:32 CDT.         Test dose negative, 3 minutes after injection, for signs of intravascular, subdural, or intrathecal injection.       Insertion/Infusion Method: LORT saline       Aspiration negative for Heme or CSF via Epidural Catheter.    Medication(s) Administered   0.25% Bupivacaine PF (Epidural) - EPIDURAL   5 mL - 5/19/2023 3:37:00 PM    FOR [facility] ([facility] ONLY:   Pain Team Contact information: please page the Pain Team Via paymio. Search \"Pain\". During daytime hours, please page the attending first. At night please page the resident first.      "

## 2023-05-19 NOTE — PLAN OF CARE
Problem: Plan of Care - These are the overarching goals to be used throughout the patient stay.    Goal: Plan of Care Review  Description: The Plan of Care Review/Shift note should be completed every shift.  The Outcome Evaluation is a brief statement about your assessment that the patient is improving, declining, or no change.  This information will be displayed automatically on your shift note.  5/19/2023 0735 by Rafaela Cunningham, RN  Outcome: Progressing   Goal Outcome Evaluation:               Nou is not coping well with her pain with her contractions. Asking for relief. Fentanyl given at 1356 and 1407. Will assess pain and make a plan.  Rafaela Cunningham RN  5/19/2023 2:16 PM

## 2023-05-19 NOTE — PROGRESS NOTES
"Labor Progress Note:    Patient Name:  Jean Paul Goodman  :      1995  MRN:      [id number]      Professional iPad [employer]  used to conduct this visit    Subjective:  \"Nou\" continues to be uncomfortable.  Moaning quietly and breathing with contractions.  Was able to eat.  Up to the bathroom PRN.  Has mostly been in bed through the morning due to fatigue from residual morphine/vistaril administration.  Chor at bedside and supportive.    Endorses new cough that has begun in last 24 hours.  Doesn't feel ill otherwise, just mild coughing.  No illness exposures that the couple are aware of.        Objective:3  SpO2:  [93 %-99 %] 97 %  /73   Pulse 70   Temp 99  F (37.2  C) (Oral)   Resp 18   Ht 1.562 m (5' 1.5\")   Wt 80.8 kg (178 lb 3.2 oz)   LMP 2022 (Exact Date)   SpO2 97%   BMI 33.13 kg/m      FHR: 130, mod variability, accels present, no decels.   Contractions: q 1-7 minutes, mild-mod to palpation.  Also some irritability between true contractions that patient doesn't respond to (breathing with stronger contractions).   Cervix: 3, 90%, 0 to +1, anterior, cephalic, bloody show present.  Continues to leak meconium stained fluid.  Other: Lungs clear to ascultation bilaterally, non-labored breathing pattern.  Mild intermittent cough.      Assessment:    -  at 41.2 weeks  - IOL for post-dates testing with BPP 2/8 and reactive NST (total score 4/10)  - Cervical ripening with cervidil last night.  Then SROM with subsequent stronger spontaneous contractions resulting in adequate cervical ripening.  - Meconium stained fluid - ROM X 5.5 hours  - New cough in last 24 hours, and temp now 99.0.  No known illness exposure.  - Category I FHTs      Plan:   - Will do a nasal swab now to evaluate for COVID, influenza, and RSV.  Await results  - Discussed the following 2 options now that cervix is ripe:  1) Further expectant management to see if labor progresses on its own as leslye " relatively frequently and has demonstrated cervical change, vs. 2) Starting pitocin to help augment labor now.  Nou and Chor talk, and would like to move things forward for actively, and are choosing to start pitocin.  Will start low-dose pitocin protocol.  -Routine CNM support & management. Encourage position changes, ambulation, rest as desired.  -Anticipate progress and vaginal birth.         Total time spent with patient: 30 minutes, of which >50% time spent counseling and coordination of care.      Provider:  SARAH Bragg/ELIZABET        Date:  5/19/2023  Time:  10:49 AM

## 2023-05-19 NOTE — PLAN OF CARE
Problem: Plan of Care - These are the overarching goals to be used throughout the patient stay.    Goal: Plan of Care Review  Description: The Plan of Care Review/Shift note should be completed every shift.  The Outcome Evaluation is a brief statement about your assessment that the patient is improving, declining, or no change.  This information will be displayed automatically on your shift note.  Outcome: Progressing   Goal Outcome Evaluation:             At bedside with Татьяна CNBENY, using Ipad  to talk with patient about her pain and plan of care.  Rafaela Cunningham RN  5/19/2023 7:36 AM

## 2023-05-20 PROBLEM — O41.1230 CHORIOAMNIONITIS IN THIRD TRIMESTER: Status: ACTIVE | Noted: 2023-05-20

## 2023-05-20 PROBLEM — Z37.9 VACUUM-ASSISTED VAGINAL DELIVERY: Status: ACTIVE | Noted: 2023-05-20

## 2023-05-20 LAB
BASOPHILS # BLD MANUAL: 0 10E3/UL (ref 0–0.2)
BASOPHILS NFR BLD MANUAL: 0 %
CREAT SERPL-MCNC: 0.97 MG/DL (ref 0.51–0.95)
EOSINOPHIL # BLD MANUAL: 0 10E3/UL (ref 0–0.7)
EOSINOPHIL NFR BLD MANUAL: 0 %
ERYTHROCYTE [DISTWIDTH] IN BLOOD BY AUTOMATED COUNT: 14.6 % (ref 10–15)
GFR SERPL CREATININE-BSD FRML MDRD: 83 ML/MIN/1.73M2
HCT VFR BLD AUTO: 37.2 % (ref 35–47)
HGB BLD-MCNC: 12 G/DL (ref 11.7–15.7)
LYMPHOCYTES # BLD MANUAL: 2.3 10E3/UL (ref 0.8–5.3)
LYMPHOCYTES NFR BLD MANUAL: 10 %
MCH RBC QN AUTO: 27.5 PG (ref 26.5–33)
MCHC RBC AUTO-ENTMCNC: 32.3 G/DL (ref 31.5–36.5)
MCV RBC AUTO: 85 FL (ref 78–100)
MONOCYTES # BLD MANUAL: 1.1 10E3/UL (ref 0–1.3)
MONOCYTES NFR BLD MANUAL: 5 %
NEUTROPHILS # BLD MANUAL: 19.5 10E3/UL (ref 1.6–8.3)
NEUTROPHILS NFR BLD MANUAL: 85 %
PLAT MORPH BLD: ABNORMAL
PLATELET # BLD AUTO: 166 10E3/UL (ref 150–450)
RBC # BLD AUTO: 4.36 10E6/UL (ref 3.8–5.2)
RBC MORPH BLD: ABNORMAL
WBC # BLD AUTO: 22.9 10E3/UL (ref 4–11)

## 2023-05-20 PROCEDURE — 250N000013 HC RX MED GY IP 250 OP 250 PS 637: Performed by: ADVANCED PRACTICE MIDWIFE

## 2023-05-20 PROCEDURE — 0UC97ZZ EXTIRPATION OF MATTER FROM UTERUS, VIA NATURAL OR ARTIFICIAL OPENING: ICD-10-PCS | Performed by: STUDENT IN AN ORGANIZED HEALTH CARE EDUCATION/TRAINING PROGRAM

## 2023-05-20 PROCEDURE — 722N000001 HC LABOR CARE VAGINAL DELIVERY SINGLE

## 2023-05-20 PROCEDURE — 258N000003 HC RX IP 258 OP 636: Performed by: STUDENT IN AN ORGANIZED HEALTH CARE EDUCATION/TRAINING PROGRAM

## 2023-05-20 PROCEDURE — 120N000001 HC R&B MED SURG/OB

## 2023-05-20 PROCEDURE — 85007 BL SMEAR W/DIFF WBC COUNT: CPT | Performed by: ADVANCED PRACTICE MIDWIFE

## 2023-05-20 PROCEDURE — 258N000003 HC RX IP 258 OP 636: Performed by: ADVANCED PRACTICE MIDWIFE

## 2023-05-20 PROCEDURE — 250N000011 HC RX IP 250 OP 636: Performed by: ADVANCED PRACTICE MIDWIFE

## 2023-05-20 PROCEDURE — 88307 TISSUE EXAM BY PATHOLOGIST: CPT | Mod: 26 | Performed by: PATHOLOGY

## 2023-05-20 PROCEDURE — 85027 COMPLETE CBC AUTOMATED: CPT | Performed by: ADVANCED PRACTICE MIDWIFE

## 2023-05-20 PROCEDURE — 250N000011 HC RX IP 250 OP 636: Performed by: ANESTHESIOLOGY

## 2023-05-20 PROCEDURE — 82565 ASSAY OF CREATININE: CPT | Performed by: ADVANCED PRACTICE MIDWIFE

## 2023-05-20 PROCEDURE — 36415 COLL VENOUS BLD VENIPUNCTURE: CPT | Performed by: ADVANCED PRACTICE MIDWIFE

## 2023-05-20 PROCEDURE — 88307 TISSUE EXAM BY PATHOLOGIST: CPT | Mod: TC | Performed by: ADVANCED PRACTICE MIDWIFE

## 2023-05-20 PROCEDURE — 250N000011 HC RX IP 250 OP 636: Performed by: STUDENT IN AN ORGANIZED HEALTH CARE EDUCATION/TRAINING PROGRAM

## 2023-05-20 PROCEDURE — 250N000009 HC RX 250: Performed by: ADVANCED PRACTICE MIDWIFE

## 2023-05-20 PROCEDURE — 999N000127 HC STATISTIC PERIPHERAL IV START W US GUIDANCE

## 2023-05-20 PROCEDURE — 0DQP0ZZ REPAIR RECTUM, OPEN APPROACH: ICD-10-PCS | Performed by: STUDENT IN AN ORGANIZED HEALTH CARE EDUCATION/TRAINING PROGRAM

## 2023-05-20 RX ORDER — ACETAMINOPHEN 325 MG/1
650 TABLET ORAL EVERY 4 HOURS PRN
Status: DISCONTINUED | OUTPATIENT
Start: 2023-05-20 | End: 2023-05-22 | Stop reason: HOSPADM

## 2023-05-20 RX ORDER — IBUPROFEN 800 MG/1
800 TABLET, FILM COATED ORAL EVERY 6 HOURS PRN
Status: DISCONTINUED | OUTPATIENT
Start: 2023-05-20 | End: 2023-05-22 | Stop reason: HOSPADM

## 2023-05-20 RX ORDER — MISOPROSTOL 200 UG/1
800 TABLET ORAL
Status: DISCONTINUED | OUTPATIENT
Start: 2023-05-20 | End: 2023-05-22 | Stop reason: HOSPADM

## 2023-05-20 RX ORDER — MODIFIED LANOLIN
OINTMENT (GRAM) TOPICAL
Status: DISCONTINUED | OUTPATIENT
Start: 2023-05-20 | End: 2023-05-22 | Stop reason: HOSPADM

## 2023-05-20 RX ORDER — AMPICILLIN 2 G/1
2 INJECTION, POWDER, FOR SOLUTION INTRAVENOUS EVERY 6 HOURS
Status: DISCONTINUED | OUTPATIENT
Start: 2023-05-20 | End: 2023-05-21

## 2023-05-20 RX ORDER — OXYTOCIN/0.9 % SODIUM CHLORIDE 30/500 ML
340 PLASTIC BAG, INJECTION (ML) INTRAVENOUS CONTINUOUS PRN
Status: DISCONTINUED | OUTPATIENT
Start: 2023-05-20 | End: 2023-05-22 | Stop reason: HOSPADM

## 2023-05-20 RX ORDER — ACETAMINOPHEN 325 MG/1
975 TABLET ORAL EVERY 6 HOURS
Status: DISCONTINUED | OUTPATIENT
Start: 2023-05-20 | End: 2023-05-20 | Stop reason: HOSPADM

## 2023-05-20 RX ORDER — CARBOPROST TROMETHAMINE 250 UG/ML
250 INJECTION, SOLUTION INTRAMUSCULAR
Status: DISCONTINUED | OUTPATIENT
Start: 2023-05-20 | End: 2023-05-22 | Stop reason: HOSPADM

## 2023-05-20 RX ORDER — HYDROCORTISONE 25 MG/G
CREAM TOPICAL 3 TIMES DAILY PRN
Status: DISCONTINUED | OUTPATIENT
Start: 2023-05-20 | End: 2023-05-22 | Stop reason: HOSPADM

## 2023-05-20 RX ORDER — METHYLERGONOVINE MALEATE 0.2 MG/ML
200 INJECTION INTRAVENOUS
Status: DISCONTINUED | OUTPATIENT
Start: 2023-05-20 | End: 2023-05-22 | Stop reason: HOSPADM

## 2023-05-20 RX ORDER — AMPICILLIN 2 G/1
2 INJECTION, POWDER, FOR SOLUTION INTRAVENOUS EVERY 6 HOURS
Status: DISCONTINUED | OUTPATIENT
Start: 2023-05-20 | End: 2023-05-20

## 2023-05-20 RX ORDER — AMPICILLIN 2 G/1
2 INJECTION, POWDER, FOR SOLUTION INTRAVENOUS EVERY 6 HOURS
Status: DISCONTINUED | OUTPATIENT
Start: 2023-05-20 | End: 2023-05-20 | Stop reason: HOSPADM

## 2023-05-20 RX ORDER — MISOPROSTOL 200 UG/1
400 TABLET ORAL
Status: DISCONTINUED | OUTPATIENT
Start: 2023-05-20 | End: 2023-05-22 | Stop reason: HOSPADM

## 2023-05-20 RX ORDER — OXYTOCIN 10 [USP'U]/ML
10 INJECTION, SOLUTION INTRAMUSCULAR; INTRAVENOUS
Status: DISCONTINUED | OUTPATIENT
Start: 2023-05-20 | End: 2023-05-22 | Stop reason: HOSPADM

## 2023-05-20 RX ORDER — DOCUSATE SODIUM 100 MG/1
100 CAPSULE, LIQUID FILLED ORAL 2 TIMES DAILY
Status: DISCONTINUED | OUTPATIENT
Start: 2023-05-20 | End: 2023-05-22 | Stop reason: HOSPADM

## 2023-05-20 RX ADMIN — AMPICILLIN SODIUM 2 G: 2 INJECTION, POWDER, FOR SOLUTION INTRAVENOUS at 20:39

## 2023-05-20 RX ADMIN — AMPICILLIN SODIUM 2 G: 2 INJECTION, POWDER, FOR SOLUTION INTRAVENOUS at 14:11

## 2023-05-20 RX ADMIN — METHYLERGONOVINE MALEATE 200 MCG: 0.2 INJECTION INTRAVENOUS at 09:52

## 2023-05-20 RX ADMIN — AMPICILLIN SODIUM 2 G: 2 INJECTION, POWDER, FOR SOLUTION INTRAMUSCULAR; INTRAVENOUS at 08:00

## 2023-05-20 RX ADMIN — GENTAMICIN SULFATE 160 MG: 40 INJECTION, SOLUTION INTRAMUSCULAR; INTRAVENOUS at 08:20

## 2023-05-20 RX ADMIN — GENTAMICIN SULFATE 160 MG: 40 INJECTION, SOLUTION INTRAMUSCULAR; INTRAVENOUS at 14:31

## 2023-05-20 RX ADMIN — Medication 100 ML/HR: at 10:13

## 2023-05-20 RX ADMIN — LIDOCAINE HYDROCHLORIDE 30 ML: 10 INJECTION, SOLUTION EPIDURAL; INFILTRATION; INTRACAUDAL; PERINEURAL at 09:58

## 2023-05-20 RX ADMIN — DOCUSATE SODIUM 100 MG: 100 CAPSULE, LIQUID FILLED ORAL at 14:15

## 2023-05-20 RX ADMIN — ACETAMINOPHEN 975 MG: 325 TABLET ORAL at 08:00

## 2023-05-20 RX ADMIN — Medication: at 04:05

## 2023-05-20 RX ADMIN — SODIUM CHLORIDE, POTASSIUM CHLORIDE, SODIUM LACTATE AND CALCIUM CHLORIDE: 600; 310; 30; 20 INJECTION, SOLUTION INTRAVENOUS at 05:12

## 2023-05-20 RX ADMIN — AMPICILLIN SODIUM 2 G: 2 INJECTION, POWDER, FOR SOLUTION INTRAVENOUS at 22:17

## 2023-05-20 RX ADMIN — KETOROLAC TROMETHAMINE 30 MG: 30 INJECTION, SOLUTION INTRAMUSCULAR; INTRAVENOUS at 12:36

## 2023-05-20 RX ADMIN — DOCUSATE SODIUM 100 MG: 100 CAPSULE, LIQUID FILLED ORAL at 22:28

## 2023-05-20 ASSESSMENT — ACTIVITIES OF DAILY LIVING (ADL)
ADLS_ACUITY_SCORE: 20

## 2023-05-20 NOTE — PLAN OF CARE
"Patient assessments and vitals are WDL. Pain has been adequately controlled by rest. Spouse is supportive of pt. Pt is attentive to infant and active in cares. Pt is breast and formula feeding baby. Educated mom how to use breast pump including usage and cares. Pt verbalized understanding. Continue to promote frequent breast pumping. When I got pt, got report that pt hadn't gotten up yet and saw that the pt's avatar had a catheter in. When I went to do my assessment, pt stated the catheter was removed in the morning and her vision was \"yellow\". Washed eyes with wash cloth and got pt up - vision returned to normal once she sat up and pt voided without difficulty. Continue to monitor pain.      Problem: Plan of Care - These are the overarching goals to be used throughout the patient stay.    Goal: Optimal Comfort and Wellbeing  Outcome: Progressing  Intervention: Monitor Pain and Promote Comfort  Recent Flowsheet Documentation  Taken 5/20/2023 1600 by Charley Mariano RN  Pain Management Interventions: rest  Intervention: Provide Person-Centered Care  Recent Flowsheet Documentation  Taken 5/20/2023 1600 by Charley Mariano RN  Trust Relationship/Rapport:    care explained    choices provided    questions answered    questions encouraged    thoughts/feelings acknowledged     Problem: Postpartum (Vaginal Delivery)  Goal: Optimal Pain Control and Function  Outcome: Progressing  Intervention: Prevent or Manage Pain  Recent Flowsheet Documentation  Taken 5/20/2023 1600 by Charley Mariano RN  Pain Management Interventions: rest     Problem: Postpartum (Vaginal Delivery)  Goal: Effective Urinary Elimination  Outcome: Progressing     "

## 2023-05-20 NOTE — PROGRESS NOTES
Labor Progress Note:    Patient Name:  Jean Paul Goodman  :      1995  MRN:      [id number]      Professional I-Pad ong  used to conduct beginning part of visit, and then switched to in-person  when  arrived on unit.       All staff in full PPE since diagnosis of COVID.      Dr Gandhi in to visit with/evaluate patient in-person.  Dr Gandhi discussed reason she is in evaluating patient, routes of potential delivery for Nou, and ways an IHOB can be of help for the end of labor/birth.  Cervical exam by Dr Gandhi at 0822 = 10/100/+2.  Dr Gandhi then pushed with Nou for a few contractions.  Dr Gandhi feels the baby is moving with pushing, and is recommending proceeding with further pushing.    CNM and RN coaching Nou in effective pushing. She is now getting the hang of pushing and baby does move small amounts with her strong pushing efforts.     FHR:  155, minimal variability with some intermittent moments of moderate variability, accels present, variable decels present with pushing to christal 115-145.  Contr:  q 2-3 minutes, strong to palpation (IUPC fell out with exam, so external toco placed)    Nou has gotten her first doses of Ampicillin (0800) and Gentamycin (0820) per Triple I protocol.  Also got oral Tylenol at 0800.        Lab updates:    Recent Results (from the past 24 hour(s))   Symptomatic Influenza A/B, RSV, & SARS-CoV2 PCR (COVID-19) Nasopharyngeal    Collection Time: 23 11:02 AM    Specimen: Nasopharyngeal; Swab   Result Value Ref Range    Influenza A PCR Negative Negative    Influenza B PCR Negative Negative    RSV PCR Negative Negative    SARS CoV2 PCR Positive (A) Negative   Creatinine    Collection Time: 23  8:04 AM   Result Value Ref Range    Creatinine 0.97 (H) 0.51 - 0.95 mg/dL    GFR Estimate 83 >60 mL/min/1.73m2   CBC with platelets and differential    Collection Time: 23  8:04 AM   Result Value Ref Range    WBC Count 22.9 (H) 4.0 - 11.0  10e3/uL    RBC Count 4.36 3.80 - 5.20 10e6/uL    Hemoglobin 12.0 11.7 - 15.7 g/dL    Hematocrit 37.2 35.0 - 47.0 %    MCV 85 78 - 100 fL    MCH 27.5 26.5 - 33.0 pg    MCHC 32.3 31.5 - 36.5 g/dL    RDW 14.6 10.0 - 15.0 %    Platelet Count 166 150 - 450 10e3/uL   Manual Differential    Collection Time: 05/20/23  8:04 AM   Result Value Ref Range    % Neutrophils 85 %    % Lymphocytes 10 %    % Monocytes 5 %    % Eosinophils 0 %    % Basophils 0 %    Absolute Neutrophils 19.5 (H) 1.6 - 8.3 10e3/uL    Absolute Lymphocytes 2.3 0.8 - 5.3 10e3/uL    Absolute Monocytes 1.1 0.0 - 1.3 10e3/uL    Absolute Eosinophils 0.0 0.0 - 0.7 10e3/uL    Absolute Basophils 0.0 0.0 - 0.2 10e3/uL    RBC Morphology Confirmed RBC Indices     Platelet Assessment  Automated Count Confirmed. Platelet morphology is normal.     Automated Count Confirmed. Platelet morphology is normal.       Vitals updates:    05/20/23 0855 -- -- -- -- 97 %   05/20/23 0852 -- (!) 102.3  F (39.1  C) Oral -- 96 %   05/20/23 0851 -- -- -- -- (!) 87 %   05/20/23 0846 -- -- -- -- (!) 83 %   05/20/23 0845 -- -- -- -- 98 %   05/20/23 0842 137/79 -- -- -- --   05/20/23 0840 -- -- -- -- 99 %   05/20/23 0835 -- -- -- -- 96 %   05/20/23 0832 -- -- -- -- (!) 89 %   05/20/23 0830 -- -- -- -- 96 %   05/20/23 0825 -- -- -- -- 98 %   05/20/23 0820 -- -- -- -- 97 %   05/20/23 0815 -- -- -- -- 96 %   05/20/23 0810 -- -- -- -- 95 %   05/20/23 0805 -- -- -- -- 96 %   05/20/23 0800 -- 100.3  F (37.9  C) -- -- 97 %   05/20/23 0755 -- -- -- -- 97 %   05/20/23 0750 -- -- -- -- 98 %   05/20/23 0745 -- -- -- -- 97 %   05/20/23 0742 135/89 -- -- -- --   05/20/23 0740 -- -- -- -- 98 %   05/20/23 0735 -- -- -- -- 97 %   05/20/23 0730 -- -- -- -- 96 %   05/20/23 0727 -- (!) 101.1  F (38.4  C) -- -- --   05/20/23 0726 -- -- -- -- 90 %   05/20/23 0725 -- -- -- -- 92 %   05/20/23 0720 -- -- -- -- 97 %   05/20/23 0715 -- -- -- -- 97 %   05/20/23 0711 -- -- -- -- (!) 86 %   05/20/23 0710 -- -- -- --  98 %   05/20/23 0705 -- -- -- -- 98 %   05/20/23 0700 -- -- -- -- 98 %   05/20/23 0655 -- -- -- -- 97 %   05/20/23 0650 -- -- -- -- 97 %   05/20/23 0645 -- -- -- -- 97 %   05/20/23 0640 -- -- -- -- 99 %   05/20/23 0635 124/70 99.1  F (37.3  C) Oral -- 98 %   05/20/23 0630 -- -- -- -- 96 %   05/20/23 0625 -- -- -- -- 96 %   05/20/23 0620 -- -- -- -- 95 %   05/20/23 0615 -- -- -- -- 96 %   05/20/23 0610 -- -- -- -- 95 %   05/20/23 0555 -- -- -- -- 96 %   05/20/23 0550 -- -- -- -- 98 %   05/20/23 0542 127/60 -- -- -- --   05/20/23 0528 -- 99.1  F (37.3  C) Oral 18 --   05/20/23 0510 -- -- -- -- 96 %   05/20/23 0505 -- -- -- -- 95 %   05/20/23 0500 -- -- -- -- 95 %   05/20/23 0455 -- -- -- -- 96 %   05/20/23 0442 122/70 -- -- -- --   05/20/23 0418 -- 98.8  F (37.1  C) Oral 18 --   05/20/23 0401 126/75 -- -- -- --   05/20/23 0345 -- 98.8  F (37.1  C) Oral -- --   05/20/23 0342 (!) 134/98 -- -- -- --   05/20/23 0243 125/78 98.9  F (37.2  C) Oral 18 --   05/20/23 0142 121/74 -- -- -- --   05/20/23 0130 -- 98.8  F (37.1  C) Oral 18 --   05/20/23 0040 134/75 -- -- -- --   05/20/23 0030 -- 98.8  F (37.1  C) Oral 18 --   05/19/23 2340 121/65 -- -- -- --   05/19/23 2335 -- 99.2  F (37.3  C) Oral 18 --   05/19/23 2243 114/66 99.2  F (37.3  C) Oral 18 --   05/19/23 2145 -- 99.3  F (37.4  C) Oral 18 --   05/19/23 2143 130/80 -- -- -- --   05/19/23 2041 122/66 -- -- -- --   05/19/23 2040 -- 98.9  F (37.2  C) Oral 18 --   05/19/23 2013 114/63 -- -- -- --   05/19/23 1942 123/78 -- -- -- --   05/19/23 1931 -- 99.1  F (37.3  C) Oral 18 --   05/19/23 1912 130/77 -- -- -- --   05/19/23 1832 -- 99.3  F (37.4  C) -- -- --   05/19/23 1745 -- -- -- -- 98 %   05/19/23 1744 -- -- -- -- 90 %   05/19/23 1742 118/69 -- -- -- --   05/19/23 1740 -- -- -- -- 97 %   05/19/23 1735 -- -- -- -- 97 %   05/19/23 1730 -- 99.4  F (37.4  C) -- -- 98 %   05/19/23 1725 -- -- -- -- 97 %   05/19/23 1720 -- -- -- -- 97 %   05/19/23 1715 -- -- -- -- 96 %    05/19/23 1711 112/64 -- -- -- --   05/19/23 1710 -- -- -- -- 97 %   05/19/23 1705 -- -- -- -- 97 %   05/19/23 1700 -- -- -- -- 98 %   05/19/23 1658 110/68 98.8  F (37.1  C) Oral -- --   05/19/23 1655 -- -- -- -- 97 %   05/19/23 1650 -- -- -- -- 98 %   05/19/23 1645 -- -- -- -- 98 %   05/19/23 1640 -- -- -- -- 98 %   05/19/23 1635 -- -- -- -- 97 %   05/19/23 1630 -- -- -- -- 99 %   05/19/23 1625 114/56 -- -- 18 97 %   05/19/23 1620 126/56 -- -- -- 99 %   05/19/23 1615 115/57 -- -- -- 99 %   05/19/23 1612 111/58 -- -- -- --   05/19/23 1610 -- -- -- -- 99 %   05/19/23 1605 102/55 -- -- -- 98 %   05/19/23 1600 105/57 -- -- -- 97 %   05/19/23 1555 101/56 98.5  F (36.9  C) -- -- 96 %   05/19/23 1550 -- -- -- -- 96 %   05/19/23 1545 102/55 -- -- 18 96 %   05/19/23 1540 -- -- -- -- 96 %   05/19/23 1535 -- -- -- -- 96 %   05/19/23 1534 114/62 -- -- -- --   05/19/23 1532 117/63 -- -- -- --   05/19/23 1531 122/81 -- -- -- --   05/19/23 1530 -- -- -- -- 96 %   05/19/23 1528 (!) 140/67 -- -- -- --   05/19/23 1526 (!) 156/71 -- -- -- --   05/19/23 1524 (!) 161/92 -- -- -- --   05/19/23 1500 134/86 98.8  F (37.1  C) Oral 20 97 %   05/19/23 1402 -- 98.8  F (37.1  C) Oral 20 97 %   05/19/23 1300 -- 99.1  F (37.3  C) Oral 18 --         Total time spent with patient: 35 minutes, >50% time spent counseling and coordination of care.          Provider:  MANDA Bragg          Date:  5/20/2023  Time:  9:18 AM

## 2023-05-20 NOTE — ANESTHESIA POSTPROCEDURE EVALUATION
Patient: Jean Paul Goodman    Procedure: * No procedures listed *       Anesthesia Type:  Epidural    Note:  Disposition: Inpatient   Postop Pain Control: Uneventful            Sign Out: Well controlled pain   PONV: No   Neuro/Psych: Uneventful            Sign Out: Acceptable/Baseline neuro status   Airway/Respiratory: Uneventful            Sign Out: Acceptable/Baseline resp. status   CV/Hemodynamics: Uneventful            Sign Out: Acceptable CV status; No obvious hypovolemia; No obvious fluid overload   Other NRE: NONE   DID A NON-ROUTINE EVENT OCCUR?            Last vitals:  Vitals:    05/20/23 1130 05/20/23 1145 05/20/23 1200   BP: 131/68 130/74 122/73   Pulse:      Resp:      Temp:      SpO2:          Electronically Signed By: Xiomara Shi MD  May 20, 2023  3:04 PM

## 2023-05-20 NOTE — PROGRESS NOTES
Labor Progress Note:    Patient Name:  Jean Paul Goodman  :      1995  MRN:      [id number]        CNM remained in an emotionally supportive role for Sundar and her family during the delivery.  Then assisted Dr Gandhi with visualization, as Dr Gandhi repaired Sundar's 4th degree laceration.    Please see Dr Gandhi's delivery note for full details of vacuum assisted vaginal birth, QBL and bleeding interventions, and repair of 4th degree laceration.  Dr Gandhi plans to continue with 1 more dose of both Ampicillin and Gentamycin due to Triple I diagnosis, several manual sweeps of uterus post birth for retained membranes/clots, and repair of 4th degree lac.  Then plans to discontinue antibiotics after that unless maternal fever persists.    Mom and baby skin-to-skin.  Combo of breast/bottle feeding planned.          Total time spent with patient: 70 minutes, >50% time spent counseling and coordination of care.          Provider:  SARAH Bragg/ELIZABET          Date:  2023  Time:  11:48 AM

## 2023-05-20 NOTE — PLAN OF CARE
Problem: Plan of Care - These are the overarching goals to be used throughout the patient stay.    Goal: Plan of Care Review  Description: The Plan of Care Review/Shift note should be completed every shift.  The Outcome Evaluation is a brief statement about your assessment that the patient is improving, declining, or no change.  This information will be displayed automatically on your shift note.  5/20/2023 1306 by aRfaela Cunningham, RN  Outcome: Progressing   Goal Outcome Evaluation:               Patient complete at 0823  Patient pushing at 0828  Delivery of viable male at 0946 via vacuum  Baby placed skin to skin  Placenta delivered at 0946  Pitocin started per provider at 0946  Toradol/Ibuprofen given at 1230  Rafaela Cunningham RN  5/20/2023 1:08 PM

## 2023-05-20 NOTE — L&D DELIVERY NOTE
OB Vaginal Delivery Note    Jean Paul Goodman MRN# [id number]   Age: 27 year old YOB: 1995       GA: 41w3d  GP:   Labor Complications: Chorioamnionitis   EBL:   mL  Delivery QBL: 79 mL  Delivery Type: Vaginal, Vacuum (Extractor)   ROM to Delivery Time: (Delivered) Days: 1 Hours: 4 Minutes: 26   Weight: 3.74 kg (8 lb 3.9 oz)    1 Minute 5 Minute 10 Minute   Apgar Totals: 8   9        TAD DYER;KLAUDIA DUNLAP;ELAINE BRENNAN     Delivery Details:  Jean Paul Goodman, a 27 year old  female delivered a viable infant with apgars of 8  and 9 . Delivery was via vaginal, vacuum (extractor)  to a sterile field under intravenous ;epidural;spinal  anesthesia.    See vacuum portion for details of vacuum and delivery.  Baby handed to mom.  Cord clamped x 2 and cut by FOB.  Segment of cord collected.  Placenta delivered sponataneously.    Exam revealed a 4th degree laceration.  With gloved finger in the rectum the rectal mucosa was brought together using 3-0 vicryl in a running fashion.  Glove changed.  Next sphincter was grasped with allis clamps, brought together in an end to end fashion with interruptued sutures of 3-0 vicryl at 6, 3, 9 and 12 o'clock.  Deep transverse perineal muscles brought together using interrupted 3-0 rapide.  Next the remaining 2nd degree laceration was closed using 3-0 vicryl, vaginal mucosa closed with running locked fashion, transitioned down and perineal muscles closed, transitioned to apex and skin closed in a running subcuticular fashion.  Additional single interrupted suture placed near introitus.  Repair hemostatic.  Rectal exam performed, good tone noted.     Infant delivered in         position. Anterior and posterior shoulders delivered without difficulty. The cord was clamped, cut twice and 3 vessels  were noted. Cord blood was obtained in routine fashion with the following disposition: lab .      Cord complications: none   Placenta  delivered at 2023  9:46 AM . Placental disposition was Hospital disposal . Initial uterine atony improved with massage, bimanual sweep and IV pitocin.  ON bimanual sweep placental membranes removed with improvement in uterine tone.  Fundal massage performed and fundus found to be firm.     Episiotomy: none    Perineum, vagina, cervix were inspected, and the following lacerations were noted:   Perineal lacerations: 4th                  Excellent hemostasis was noted. Needle count correct. Infant and patient in delivery room in good and stable condition.        Tongkhuexiong, Male-Nouxiong [[id number]]    Labor Event Times    Active labor onset date: 23 Onset time:  2:30 AM CDT   Start pushing date/time: 2023 0828      Labor Length    3rd Stage (hrs): 0 (min): 0      Labor Events     labor?: No  Labor Type: Induction/Cervical ripening  Predominate monitoring during 1st stage: continuous electronic fetal monitoring     Antibiotics received during labor?: No     Rupture date/time: 23 0520   Rupture type: Spontaneous Rupture of Membranes  Fluid color: Meconium     Induction: Cervidil  Induction date/time:      Cervical ripening date/time:      Indications for induction: Other Pregnant Patient Indications (Comment to specify)     Augmentation: Oxytocin     Delivery/Placenta Date and Time    Delivery Date: 23 Delivery Time:  9:46 AM   Placenta Date/Time: 2023  9:46 AM  Oxytocin given at the time of delivery: after delivery of baby  Delivering clinician: Malinda Gandhi MD   Other personnel present at delivery:  Provider Role   Татьяна Peñaloza P, APRN Rafaela Ruiz RN Craig, Molly M, RN          Vaginal Counts     Initial count performed by 2 team members:  Two Team Members   Oksana Cunningham       [place] Suture Needles Sponges (RETIRED) Instruments   Initial counts 0 0 5    Added to count 1 6 5    Relief counts       Final counts 1 6 10          Placed during labor  "Accounted for at the end of labor   FSE Yes Yes   IUPC Yes Yes   Cervidil No NA              Final count performed by 2 team members:  Two Team Members   Jh Luna      Final count correct?: Yes  Pre-Birth Team Brief: Complete  Post-Birth Team Debrief: Complete     Apgars    Living status: Living   1 Minute 5 Minute 10 Minute 15 Minute 20 Minute   Skin color: 0  1       Heart rate: 2  2       Reflex irritability: 2  2       Muscle tone: 2  2       Respiratory effort: 2  2       Total: 8  9       Apgars assigned by: ELAINE BRENNAN RN     Cord    Vessels: 3 Vessels    Cord Complications: None   Cord Blood Disposition: Lab      Gases Sent?: No      Delayed cord clamping?: No      Stem cell collection?: No                     [place] Resuscitation    Methods: None  [place] Care at Delivery: Called by Dr. Gandhi to attend delivery secondary to MSAF, Vacuum assist, COVID+. Infant delivered at 0946 on 23 and cried vigorously. He was taken to warmer where he was dried, stimulated and bulb suctioned. No other resuscitation required.    Nunez Assessment Tool Data  Gestational Age: 41 3/7 weeks  Maternal temperature range: 102 degrees   Membranes ruptured for: 27 hours  GBS status: neg  Antibiotic Status: place a check mindy in the appropriate box  No Antibiotics or any antibiotics <2 hours prior to birth     Determination based on clinical exam after birth:  Based on the examination this is Well appearing.  Blood culture obtained:  Yes   Sepsis Calculator: [email or web address].org/calculator   Nunez Score, PRELIMINARY  : 6.52   Nunez Score, FINAL  : 2.68   Disposition:  [facility] to [facility] 2023 10:24 AM           Measurements    Weight: 8 lb 3.9 oz Length: 1' 8.5\"   Head circumference: 34.3 cm       Skin to Skin and Feeding Plan    Skin to skin initiation date/time: 1841    Skin to skin with: Mother  Skin to skin end date/time: 1841       Labor Events and " Shoulder Dystocia    Fetal Tracing Prior to Delivery: Category 1  Shoulder dystocia present?: Neg     Delivery (Maternal) (Provider to Complete) (112206)    Episiotomy: None  Perineal lacerations: 4th Repaired?: Yes   Repair suture: 3-0 Vicryl, 3-0 Rapide  Genital tract inspection done: Pos     Blood Loss  Mother: Jean Paul Goodman #[id number]   Start of Mother's Information    Delivery Blood Loss  23 0230 - 23 1231    Delivery QBL (mL) Hospital Encounter 931 mL    Total  931 mL         End of Mother's Information  Mother: Jean Paul Goodman #[id number]          Delivery - Provider to Complete ([id number])    Delivering clinician: Malinda Gandhi MD  Delivery Type (Choose the 1 that will go to the Birth History): Vaginal, Vacuum (Extractor)    Verbal Informed Consent Obtained For Vacuum: Yes Alternate Labor Strategies Discussed (vacuum): Yes    Emergency Resources Available (vacuum): Charge Nurse/Team, Resuscitation Team   Type (vacuum): [place] Accrued Pulling Time (vacuum): 60 seconds   # of Pop-Offs (vacuum): 0 # of Pulls/Contractions (vacuum): 1   Position (vacuum): OA Fetal Station (vacuum): +2   Indication for Operative Vaginal Delivery (vacuum): Maternal Exhaustion   Operative Vaginal Delivery Brief Note Vacuum: Patient made slow progress during latent labor, but once active labor began she quickly progressed to 9 cm.  She had an epidural for pain control.  She progressed to 9.5 cm then complete dilation on subsequent checks.  She began to push initially with excellent efforts.  She pushed for 1 hour and pushed to visible scalp.  At this point she began to tire.  FHT overall reassuring but did have periods of fetal tachycardia with diagnosis of triple I.  She was offered option of vacuum assist, continued pushing without vacuum or discussion of .  Risks of vacuum including but not limited to bruising and bleeding on baby's head and brain or worse maternal laceration reviewed.   Discussed if vacuum not successful then would need to consider  vs convert to .  Patient opted for vacuum.  Baby felt JEVON.  Felt to be adequate space for delivery.  Vacuum applied to flexion point, pumped to green zone.  With maternal expulsive efforts gentle downward and then upward traction applied.  One pull resulted in delivery of fetal head.  Nuchal cord was loose and reduced.  Head restituted to ROT.  Left anterior shoulder delivered with gentle downward traction, posterior shoulder delivered with upward traction followed by body and limbs.    Other personnel:  Provider Role   Татьяна Peñaloza, APRN Rafaela Ruiz, Lakeisha Torres RN                                Placenta    Date/Time: 2023  9:46 AM  Removal: Spontaneous  Disposition: Hospital disposal           Anesthesia    Method: INTRAVENOUS , Epidural, Spinal  Cervical dilation at placement: 4-7                       Malinda Gandhi MD

## 2023-05-20 NOTE — PROGRESS NOTES
Labor Progress Note:    Assessment: 27 year old  @ 41w2d here for IOL for post-dates and non-reassuring fetal testing with BPP 2/8 and reactive NST (total score 4/10)  - Fetal status: FHT Category 1 with mod variability; periods of increased baseline and minimal variability, improved after 250 mL fluid bolus and acetaminophen at 1830    Plan:   - This writer assumed care at 1800 from Татьяна Peñaloza CNM  -Routine [facility] support & management. Encourage position changes and rest as desired. Assisted into high throne position to help with cervical edema.   -Plan to continue titration of pitocin until contractions are adequate.  -Anticipate progress.       Subjective:  Jean Paul is coping well with contractions. Not feeling contractions at all and denies any pelvic pressure. Has been able to rest well since epidural placed. PO fluid intake. Barboza catheter in place. Partner Chor present and supportive at bedside.    Objective:  Patient Vitals for the past 4 hrs:   BP Temp Temp src Resp SpO2   23 -- 99.1  F (37.3  C) Oral 18 --   23 191 130/77 -- -- -- --   23 1832 -- 99.3  F (37.4  C) -- -- --   23 174 -- -- -- -- 98 %   23 1744 -- -- -- -- 90 %   23 174 118/69 -- -- -- --   23 1740 -- -- -- -- 97 %   23 1735 -- -- -- -- 97 %   23 173 -- 99.4  F (37.4  C) -- -- 98 %   23 172 -- -- -- -- 97 %   23 172 -- -- -- -- 97 %   23 171 -- -- -- -- 96 %   23 112/64 -- -- -- --   23 171 -- -- -- -- 97 %   23 170 -- -- -- -- 97 %   23 170 -- -- -- -- 98 %   23 1658 110/68 98.8  F (37.1  C) Oral -- --   23 1655 -- -- -- -- 97 %   23 1650 -- -- -- -- 98 %   23 1645 -- -- -- -- 98 %   23 1640 -- -- -- -- 98 %   23 1635 -- -- -- -- 97 %   23 1630 -- -- -- -- 99 %       Membrane Status: Ruptured x14 hours at time of assessment    Fetal Heart Rate:  FHR:Baseline: 140 bpm,  Variability: Moderate (6 - 25 bpm), Accelerations: present and Decelerations: Absent at time of assessment. Periods of minimal variability.     Uterine contractions:IUPCFrequency: Every 3 minutes, Duration: 60 seconds and Intensity: moderate    SVE: 5/90/0 with anterior cervical edema          SARAH Gallego CNM        Total time spent with patient: 20 minutes, of which >50% time spent counseling and coordination of care.    Professional ong  present at bedside.     5/19/2023 8:21 PM

## 2023-05-20 NOTE — PROGRESS NOTES
Labor Progress Note:    Patient Name:  Jean Paul Goodman  :      1995  MRN:      [id number]        Nou has been pushing for an hour (since 0823) and is reporting fatigue.  She doesn't feel like she can continue pushing much longer.  She is asking for help to get the baby out.    CNM consulted with Dr Gandhi again for the possibility of a vacuum assisted vaginal birth.  Dr Gandhi discussed option with patient/ and Nou accepts this intervention.  Care transferred to Dr Gandhi for planned vacuum delivery.  CNM will remain in an emotionally supportive role.        Total time spent with patient: 25 minutes, >50% time spent counseling and coordination of care.          Provider:  SARAH Bragg/ELIZABET          Date:  2023  Time:  11:44 AM

## 2023-05-20 NOTE — PROGRESS NOTES
Labor Progress Note:    Assessment: 27 year old  @ 41w3d here for IOL for BPP 2/8 and postdates.   - Fetal status: FHT Category 1 with moderate variability    Plan:   - Contractions adequate.   -Assisted in to Left side-lying flying cowgirl position.   -Routine CNM support & management. Encourage position changes, ambulation, rest as desired.  -Anticipate progress and NSVB.   -Reevaluate progress in 2 hours or sooner with a change in status.       Subjective:  Jean Paul is coping well with contractions. States that she is feeling some discomfort with contractions, but able to relax through. No longer vocalizing. PO fluid intake. Chor at bedside.    Objective:  Patient Vitals for the past 4 hrs:   BP Temp Temp src Resp   23 0130 -- 98.8  F (37.1  C) Oral 18   23 0040 134/75 -- -- --   23 0030 -- 98.8  F (37.1  C) Oral 18   23 2340 121/65 -- -- --   23 2335 -- 99.2  F (37.3  C) Oral 18   23 2243 114/66 99.2  F (37.3  C) Oral 18       Membrane Status: Ruptured     Fetal Heart Rate:  FHR:Baseline: 135 bpm, Variability: Moderate (6 - 25 bpm), Accelerations: present and Decelerations: Absent    Uterine contractions:IUPCFrequency: Every 2-3 minutes, Duration: 60 seconds and Intensity: moderate, MVUs 190-220    SVE: 9/90/0, all cervix remains on patient's left side; less anterior edema        SARAH Gallego CNM        Total time spent with patient: 10 minutes, of which >50% time spent counseling and coordination of care.    2023 2:24 AM     Visit conducted with ipad [employer] .

## 2023-05-20 NOTE — CONSULTS
CONSULTATION - GYN    NAME: Jean Paul Goodman   : 1995   MRN: [id number]      [facility]    ADMISSION DATE: 2023    PCP:  Cynthia Jones     CHIEF COMPLAINT: labor progress    HPI: I have been requested by Татьяна Peñaloza to evaluate Jean Paul Goodman for route of delivery and labor progress. Patient is a 27 year old female, that was admitted to the hospital for IOL after BPP of 10 at 41 weeks (points for NST and fluid). Patient made slow change in latent labor, but entered active labor overnight progressing to 9 cm, was 9.5 cm on next check but then remained unchanged after 2 hours.  Patient is comfortable with epidural.  COVID positive on admission, now ruptured >20 hours with new fever, antibiotics getting started for triple I.      Patient resting in bed, comfortable with epidural.  She voices her preference is to pursue a vaginal delivery but is open to  if indicated.        PAST MEDICAL HISTORY:  History reviewed. No pertinent past medical history.    PAST SURGICAL HISTORY:  Past Surgical History:   Procedure Laterality Date     NO HISTORY OF SURGERY         SOCIAL HISTORY:  Social History     Socioeconomic History     Marital status:      Spouse name: Bala     Number of children: 0     Years of education: Not on file     Highest education level: Not on file   Occupational History     Not on file   Tobacco Use     Smoking status: Never     Passive exposure: Never     Smokeless tobacco: Never   Vaping Use     Vaping status: Not on file   Substance and Sexual Activity     Alcohol use: Never     Drug use: Never     Sexual activity: Not on file   Other Topics Concern     Not on file   Social History Narrative     Not on file     Social Determinants of Health     Financial Resource Strain: Not on file   Food Insecurity: Not on file   Transportation Needs: Not on file   Physical Activity: Not on file   Stress: Not on file   Social Connections: Not on  file   Intimate Partner Violence: Not on file   Housing Stability: Not on file       MEDICATIONS:  Current Facility-Administered Medications   Medication     acetaminophen (TYLENOL) tablet 650 mg     acetaminophen (TYLENOL) tablet 975 mg     ampicillin (OMNIPEN) 2 g vial to attach to  mL bag    And     gentamicin (GARAMYCIN) 160 mg in sodium chloride 0.9 % 50 mL intermittent infusion    And     gentamicin (GARAMYCIN) 120 mg in sodium chloride 0.9 % 50 mL intermittent infusion     carboprost (HEMABATE) injection 250 mcg     diphenhydrAMINE (BENADRYL) injection 25 mg     fentaNYL (PF) (SUBLIMAZE) injection 50 mcg     fentaNYL (SUBLIMAZE) 2 mcg/mL, ROPivacaine (NAROPIN) 0.1% in NaCl 0.9% for PIB + PCEA PREMIX     ketorolac (TORADOL) injection 30 mg    Or     ketorolac (TORADOL) injection 30 mg    Or     ibuprofen (ADVIL/MOTRIN) tablet 800 mg     lactated ringers BOLUS 1,000 mL    Or     lactated ringers BOLUS 500 mL     lactated ringers infusion     lactated ringers infusion     lidocaine (LMX4) cream     lidocaine 1 % 0.1-1 mL     lidocaine 1 % 0.1-20 mL     Medication Instructions - cervical ripening and induction medications     methylergonovine (METHERGINE) injection 200 mcg     metoclopramide (REGLAN) injection 10 mg    Or     metoclopramide (REGLAN) tablet 10 mg     misoprostol (CYTOTEC) tablet 400 mcg    Or     misoprostol (CYTOTEC) tablet 800 mcg     nalbuphine (NUBAIN) injection 2.5-5 mg     naloxone (NARCAN) injection 0.2 mg    Or     naloxone (NARCAN) injection 0.4 mg    Or     naloxone (NARCAN) injection 0.2 mg    Or     naloxone (NARCAN) injection 0.4 mg     nitrous oxide/oxygen 50/50 blend     ondansetron (ZOFRAN ODT) ODT tab 4 mg    Or     ondansetron (ZOFRAN) injection 4 mg     oxytocin (PITOCIN) 30 units in 500 mL 0.9% NaCl infusion     oxytocin (PITOCIN) 30 units in 500 mL 0.9% NaCl infusion     oxytocin (PITOCIN) 30 units in 500 mL 0.9% NaCl infusion     oxytocin (PITOCIN) injection 10 Units  "    oxytocin (PITOCIN) injection 10 Units     phenylephrine (AFIA-SYNEPHRINE) injection 100 mcg     prochlorperazine (COMPAZINE) injection 10 mg    Or     prochlorperazine (COMPAZINE) tablet 10 mg    Or     prochlorperazine (COMPAZINE) suppository 25 mg     sodium chloride (PF) 0.9% PF flush 3 mL     sodium chloride (PF) 0.9% PF flush 3 mL     sodium citrate-citric acid (BICITRA) solution 30 mL     tranexamic acid (CYKLOKAPRON) bolus 1 g vial attach to NaCl 50 or 100 mL bag ADULT       ALLERGIES:  No Known Allergies    REVIEW OF SYSTEMS    Negative except what is stated in the HPI    PHYSICAL EXAM:  /70   Pulse 70   Temp 100.3  F (37.9  C)   Resp 18   Ht 1.562 m (5' 1.5\")   Wt 80.8 kg (178 lb 3.2 oz)   LMP 2022 (Exact Date)   SpO2 97%   BMI 33.13 kg/m     General Appearance: Alert, appropriate appearance for age. No acute distress,   HEENT : Grossly normal  Chest/Respiratory: Normal chest wall and respirations. Clear to auscultation.,   Cardiovascular: Regular rate and rhythm   Skin: no rash or abnormalities,   Neurologic: Normal gait and speech   Psychiatric: Alert and oriented, appropriate affect.    SVE 10/100/+2, baby feels JEVON  FHT baseline 160's, initially minimal variability but moderate and accels present with scalp stim    LABS  Lab Results   Component Value Date    HGB 12.0 2023     [unfilled]    Lab Results: personally reviewed.     IMAGING:  [unfilled]    Radiology Results: Personally reviewed impression/s    IMPRESSION:  27 year old  Female,      RECOMMENDATIONS:  Complete and +2.  Offered  vs attempting pushing.  Patient voices preference to pursue vaginal delivery.  FHT overall reassuring, accels present with scalp stim, fetal tachycardia not unexpected in setting of triple I and maternal fever.  Discussed with CNM, patient will begin pushing.  Discussed if further concerns regarding progress or FHT an call [facility] back for evaluation of operative delivery.  Baby " feels JEVON.    Thank you for allowing us to participate in the care of this patient.  Please contact us with any questions/concerns.    Malinda Gandhi MD     CC: Cynthia Jones

## 2023-05-20 NOTE — PLAN OF CARE
Problem: Plan of Care - These are the overarching goals to be used throughout the patient stay.    Goal: Plan of Care Review  Description: The Plan of Care Review/Shift note should be completed every shift.  The Outcome Evaluation is a brief statement about your assessment that the patient is improving, declining, or no change.  This information will be displayed automatically on your shift note.  Outcome: Progressing   Goal Outcome Evaluation:  0720 Татьяна CNM and this RN into room to evaluate patient progress. Ipad  used to communicate with patient. Татьяна did cervical exam 9.5/+2. Татьяна will talk with Dr. Gandhi and make a plan.  Rafaela Cunningham RN  5/20/2023 7:42 AM

## 2023-05-20 NOTE — PROGRESS NOTES
Labor Progress Note:    Assessment: 27 year old  @ 41w3d here for IOL for BPP 2/8, postdates induction  - Fetal status: FHT Category 1 with moderate variability    Plan:   -Татьяна Peñaloza CNM, assuming care at 0600.  -Will attempt to have in person interpretor for second stage.   -Assisted to exaggerated right side-lying with peanut ball.   - Continue to labor. Reviewed that she will likely start to feel constant pressure which will indicate that cervix is fully dilated and can start pushing or labor down.   -Routine CNM support & management. Encourage position changes and rest as desired.  -Encouraged PO fluids, Nou requests warm water.   -Anticipate progress and NSVB.       Subjective:  Nouxiong is coping well with contractions. Feels intermittent pressure but no pain currently. PO fluid intake. Barboza with adequate urine output.  Voiding without issue. Chor remains at bedside.    Objective:  Patient Vitals for the past 4 hrs:   BP Temp Temp src Resp SpO2   23 0528 -- 99.1  F (37.3  C) Oral 18 --   23 0510 -- -- -- -- 96 %   23 0505 -- -- -- -- 95 %   23 0500 -- -- -- -- 95 %   23 0455 -- -- -- -- 96 %   23 0442 122/70 -- -- -- --   23 0418 -- 98.8  F (37.1  C) Oral 18 --   23 0401 126/75 -- -- -- --   23 0345 -- 98.8  F (37.1  C) Oral -- --   23 0342 (!) 134/98 -- -- -- --   23 0243 125/78 98.9  F (37.2  C) Oral 18 --   23 0142 121/74 -- -- -- --   23 0130 -- 98.8  F (37.1  C) Oral 18 --       Membrane Status: Ruptured     Fetal Heart Rate:  FHR:Baseline: 140 bpm, Variability: Moderate (6 - 25 bpm), Accelerations: present and Decelerations: Absent    Uterine contractions:IUPCFrequency: Every 3 minutes, Duration: 50-60 seconds and Intensity: strong    SVE: anterior lip/90/+1; lip is thin and no edema currently        SARAH Gallego CNM        Total time spent with patient: 15 minutes, of which >50% time spent counseling and  coordination of care.    5/20/2023 5:29 AM     Visit conducted with phone Juan interpretor.

## 2023-05-20 NOTE — PROGRESS NOTES
Labor Progress Note:    Assessment: 27 year old  @ 41w2d here for IOL for BPP 2/8 and postdates  - Fetal status: FHT Category 2 with moderate variability    Plan:   -Discussed that  birth is an option if desired. Also d iscussed steps that we can take now to help get Nou more comfortable and help baby rotate and descend. Nou open to trying these measures now.   - Assisted Nou to exaggerated right side-lying with peanut ball--reports some relief of pain/pressure with position change.   -Reviewed that if inadequate pain relief, can call [facility] back for assessment.   -Reviewed how to use bolus button for epidural and explained that it will not take pressure away but she should use it if feeling pain.   -Continue titrating pitocin until contractions are adequate.   -IV benadryl ordered for cervical edema.  -Routine CNM support & management. Encourage position changes and rest as desired.  -Anticipate progress.       Subjective:  Nouxiong is not coping well with contractions. Feeling pressure and vaginal pain. Gently moaning with each contraction. Feels that epidural is no longer working. Asking about a  birth. Chor at bedside. Chor not supportive of opting for .     Objective:  Patient Vitals for the past 4 hrs:   BP Temp Temp src Resp   235 -- 99.3  F (37.4  C) Oral 18   23 130/80 -- -- --   23 122/66 -- -- --   23 -- 98.9  F (37.2  C) Oral 18   23 114/63 -- -- --   23 194 123/78 -- -- --   23 -- 99.1  F (37.3  C) Oral 18   23 191 130/77 -- -- --       Membrane Status: Ruptured     Fetal Heart Rate:  FHR:Baseline: 140 bpm, Variability: Moderate (6 - 25 bpm), Accelerations: present and Decelerations: occasional variable    Uterine contractions:IUPCFrequency: Every 2-4 minutes, Duration: 60 seconds and Intensity: moderate, MVUs 150    SVE: 5.5/90/0, anterior cervical edema persists         Flower Carreno, APRN  CNM        Total time spent with patient: 30 minutes, of which >50% time spent counseling and coordination of care.    Visit conducted with IPad Juan .    5/19/2023 10:33 PM

## 2023-05-20 NOTE — PROVIDER NOTIFICATION
05/20/23 0403   Epidural Placement Care   Epidural Request/Placement   (utilizing bolus button and placed on Right side)   Regional Anesthesia Motor Assessment   Motor Left Lower Extremity Able to bend knee;Able to bend ankle;Able to lift buttocks off bed   Motor Right Lower Extremity Able to bend knee;Able to bend ankle;Able to lift buttocks off bed   Regional Anesthesia Sensory Assessment   Dermatomes Left Mid abdomen/back   Dermatomes Right Lower abdomen/back   Patient complaining of breakthrough pain with epidural. Placed on Right side and pressing bolus button. Video  used.    0443: Patient reports pain decreased and is now comfortable. Epidural level T9 bilaterally.

## 2023-05-20 NOTE — PROGRESS NOTES
Report given to Beatriz ARANGO RN.  Care of patient relinquished.  Rafaela Cunningham RN  5/19/2023 7:13 PM

## 2023-05-20 NOTE — PROGRESS NOTES
Report given to Charley SYED. Care of patient relinquished.  Rafaela Cunningham RN  5/20/2023 3:07 PM

## 2023-05-20 NOTE — PROGRESS NOTES
"Labor Progress Note:    Patient Name:  Jean Paul Goodman  :      1995  MRN:      [id number]      Professional I-Pad [employer]  used to conduct several visits with patient.      All staff in full PPE since diagnosis of COVID.      Subjective:  Nou remains comfortable and isn't having pain, but is bothered by new body chills with involuntary shaking.  Patient is describing a different feeling than typical end of labor shakes, rather she's very chilled along with the shakes.      Moved from R lateral with peanut ball to L lateral with peanut ball following cervical exam.    Objective:  Temp:  [98.2  F (36.8  C)-101.1  F (38.4  C)] 101.1  F (38.4  C)  Resp:  [18-20] 18  BP: (101-161)/(55-98) 124/70  SpO2:  [86 %-99 %] 97 %  /70   Pulse 70   Temp (!) 101.1  F (38.4  C)   Resp 18   Ht 1.562 m (5' 1.5\")   Wt 80.8 kg (178 lb 3.2 oz)   LMP 2022 (Exact Date)   SpO2 97%   BMI 33.13 kg/m      FHR: Currently 155, minimal variability, accels absent, no decels.   (*In the last few hours baseline FHR was 138, with mod variability, so there is a baseline change)  Contractions: q 2-3 minutes, strong to palpation, adequate labor.  Cervix: Cervix continues to be 9.5 (there is a persistent cervical lip/rim located at 12:00 position, it feels similar to the thicker rim that was present yesterday.  There is no cervix present around the other 3/4 of fetal head).  +2 station now.  Suspect OA fetal positioning.  Pelvis feels adequate for size of baby.    Other: Shaking chills present.  Maternal skin feels overly warm to palpation  Pitocin:  12 mu/min      Assessment:    -  at 41.2 weeks, IOL for post-dates testing with BPP 2/8 and reactive NST.    History of:    Cervical ripening with cervidil.      SROM with subsequent stronger spontaneous contractions.      Pitocin augmentation  - Persistent anterior lip - essentially no change (except station) since last exam at ~0530  - New maternal fever with " shaking chills, in context of ROM > 24 hours.  Maternal HR is in the 70s - >100.  Although not tachycardic, FHR baseline has risen to 155.  Suspect new onset Triple I as symptoms very different from low-grade 99's temp had been running over the past day due COVID.  - Meconium stained fluid - ROM X nearly 26+ hours  - COVID positive - diagnosed yesterday (has had temps in 99's through much of last 24 hours, with this temp range thought to be likely due to COVID diagnosis).    - Category II FHTs  - Comfortable with epidural      Plan:   - CNM consulted Dr Gandhi.  Reviewed pregnancy history, labor history so far, how cervical status unchanged, new maternal fever, etc.  Dr Gandhi agrees to come and evaluate Nou and help establish a plan of care.  [place] [facility]  [place] at 0800, therefore plan established to wait 15 more minutes for 's arrival so that [facility] consultation can be done more easily due to in-person interpreting.     - Plan established with Dr Gandhi to start Triple I protocol now.  Orders placed.        Total time spent with patient: 25 minutes, of which >50% time spent counseling and coordination of care.      Provider:  SARAH Bragg/ELIZABET        Date:  5/20/2023  Time:  7:44 AM

## 2023-05-20 NOTE — PROGRESS NOTES
[facility] unable to obtain in-person [facility]  for overnight.  Ipad at bedside.

## 2023-05-21 ENCOUNTER — HEALTH MAINTENANCE LETTER (OUTPATIENT)
Age: 28
End: 2023-05-21

## 2023-05-21 LAB
BASOPHILS # BLD AUTO: 0.1 10E3/UL (ref 0–0.2)
BASOPHILS NFR BLD AUTO: 0 %
EOSINOPHIL # BLD AUTO: 0.2 10E3/UL (ref 0–0.7)
EOSINOPHIL NFR BLD AUTO: 1 %
ERYTHROCYTE [DISTWIDTH] IN BLOOD BY AUTOMATED COUNT: 14.6 % (ref 10–15)
HCT VFR BLD AUTO: 25.8 % (ref 35–47)
HGB BLD-MCNC: 8.5 G/DL (ref 11.7–15.7)
HGB BLD-MCNC: 8.5 G/DL (ref 11.7–15.7)
HOLD SPECIMEN: NORMAL
IMM GRANULOCYTES # BLD: 0.2 10E3/UL
IMM GRANULOCYTES NFR BLD: 1 %
LYMPHOCYTES # BLD AUTO: 2.7 10E3/UL (ref 0.8–5.3)
LYMPHOCYTES NFR BLD AUTO: 11 %
MCH RBC QN AUTO: 27.5 PG (ref 26.5–33)
MCHC RBC AUTO-ENTMCNC: 32.8 G/DL (ref 31.5–36.5)
MCV RBC AUTO: 85 FL (ref 78–100)
MONOCYTES # BLD AUTO: 1.5 10E3/UL (ref 0–1.3)
MONOCYTES NFR BLD AUTO: 6 %
NEUTROPHILS # BLD AUTO: 20.9 10E3/UL (ref 1.6–8.3)
NEUTROPHILS NFR BLD AUTO: 81 %
NRBC # BLD AUTO: 0 10E3/UL
NRBC BLD AUTO-RTO: 0 /100
PLATELET # BLD AUTO: 157 10E3/UL (ref 150–450)
RBC # BLD AUTO: 3.05 10E6/UL (ref 3.8–5.2)
WBC # BLD AUTO: 25.5 10E3/UL (ref 4–11)

## 2023-05-21 PROCEDURE — 250N000013 HC RX MED GY IP 250 OP 250 PS 637: Performed by: ADVANCED PRACTICE MIDWIFE

## 2023-05-21 PROCEDURE — 120N000001 HC R&B MED SURG/OB

## 2023-05-21 PROCEDURE — 250N000011 HC RX IP 250 OP 636: Performed by: STUDENT IN AN ORGANIZED HEALTH CARE EDUCATION/TRAINING PROGRAM

## 2023-05-21 PROCEDURE — 85018 HEMOGLOBIN: CPT | Performed by: ADVANCED PRACTICE MIDWIFE

## 2023-05-21 PROCEDURE — 258N000003 HC RX IP 258 OP 636: Performed by: ADVANCED PRACTICE MIDWIFE

## 2023-05-21 PROCEDURE — 85025 COMPLETE CBC W/AUTO DIFF WBC: CPT | Performed by: ADVANCED PRACTICE MIDWIFE

## 2023-05-21 PROCEDURE — 36415 COLL VENOUS BLD VENIPUNCTURE: CPT | Performed by: ADVANCED PRACTICE MIDWIFE

## 2023-05-21 PROCEDURE — 250N000011 HC RX IP 250 OP 636: Performed by: ADVANCED PRACTICE MIDWIFE

## 2023-05-21 RX ORDER — AMPICILLIN 2 G/1
2 INJECTION, POWDER, FOR SOLUTION INTRAVENOUS EVERY 6 HOURS
Status: DISCONTINUED | OUTPATIENT
Start: 2023-05-21 | End: 2023-05-21

## 2023-05-21 RX ORDER — DIPHENHYDRAMINE HYDROCHLORIDE 50 MG/ML
50 INJECTION INTRAMUSCULAR; INTRAVENOUS
Status: DISCONTINUED | OUTPATIENT
Start: 2023-05-21 | End: 2023-05-22 | Stop reason: HOSPADM

## 2023-05-21 RX ORDER — METHYLPREDNISOLONE SODIUM SUCCINATE 125 MG/2ML
125 INJECTION, POWDER, LYOPHILIZED, FOR SOLUTION INTRAMUSCULAR; INTRAVENOUS
Status: DISCONTINUED | OUTPATIENT
Start: 2023-05-21 | End: 2023-05-22 | Stop reason: HOSPADM

## 2023-05-21 RX ADMIN — GENTAMICIN SULFATE 120 MG: 40 INJECTION, SOLUTION INTRAMUSCULAR; INTRAVENOUS at 10:35

## 2023-05-21 RX ADMIN — AMPICILLIN SODIUM 2 G: 2 INJECTION, POWDER, FOR SOLUTION INTRAMUSCULAR; INTRAVENOUS at 09:50

## 2023-05-21 RX ADMIN — DOCUSATE SODIUM 100 MG: 100 CAPSULE, LIQUID FILLED ORAL at 08:04

## 2023-05-21 RX ADMIN — DOCUSATE SODIUM 100 MG: 100 CAPSULE, LIQUID FILLED ORAL at 21:40

## 2023-05-21 RX ADMIN — AMPICILLIN SODIUM 2 G: 2 INJECTION, POWDER, FOR SOLUTION INTRAVENOUS at 04:13

## 2023-05-21 ASSESSMENT — ACTIVITIES OF DAILY LIVING (ADL)
ADLS_ACUITY_SCORE: 20

## 2023-05-21 NOTE — PLAN OF CARE
Patient assessments and vitals are WDL. Pain has been adequately controlled by rest and icepacks. Pt received 113mL out of 290mL of her Iron Sucrose dose. RN stayed at bedside and monitor vitals and IV site. After 30 minutes, pt reported slight pain but stated it may be related to bending her arm when feeding baby during infusion. RN slowed the rate and applied an ice-pack. monitored pt. 15mins later, pt report her pain was an 8/10 and IV had slight bruising at IV site. RN stopped the medication and discharge the IV. Discussed if pt would be interested in getting a new IV to finish her med and pt stated she would rather use an oral iron instead for now. MD notified and stated she will begin oral iron starting tomorrow and will send pt home with iron. Spouse is supportive of pt. Pt is attentive to infant and active in cares. Pt is formula feeding baby - states she has been regularly pumping. Birth certificate turned in, no ROP needed. Patient is up ad apple. Voiding without difficulty. Continue to monitor pain.      Problem: Plan of Care - These are the overarching goals to be used throughout the patient stay.    Goal: Optimal Comfort and Wellbeing  5/21/2023 1619 by Charley Mariano RN  Outcome: Progressing  Intervention: Monitor Pain and Promote Comfort  Recent Flowsheet Documentation  Taken 5/21/2023 0800 by Charley Mariano RN  Pain Management Interventions: rest  Intervention: Provide Person-Centered Care  Recent Flowsheet Documentation  Taken 5/21/2023 1530 by Charley Mariano, RN  Trust Relationship/Rapport:   care explained   choices provided   questions answered   questions encouraged   thoughts/feelings acknowledged

## 2023-05-21 NOTE — PROGRESS NOTES
Postpartum Progress Note     Patient Name:  Jean Paul Goodman  :      1995  MRN:      [id number]    Update from RN is that patient asymptomatic of blood loss and HGB drawn this morning (see below).  ELIZABET learned in conversation with RN that Nou's Amp/Gent dosing continue through the night.  There had been a plan set by Dr Gandhi yesterday for them to be DC'd after patient was given 1 more dose of each post birth.    ELIZABET updated IHOB Dr CARLOS Gunderson on patient status and vitals today, current WBC count (see below), and the fact that both the Ampicillin and Gentamycin have been continued through the night last night.  Dr Gunderson shares that her typical process it to continue IV antibiotics for 24 hours post birth in Triple I patients, and therefore she recommends that we give 1 last dose of each the Gentamycin and Ampicilling this morning, and then discontinue them as Nou will be 24 hours post birth.  ELIZABET updated RN of plan.  Next antibiotics due ~1000 and will be given and then discontinued afterward.      Patient Vitals for the past 24 hrs:   BP Temp Temp src Pulse Resp SpO2   23 0800 98/59 98.7  F (37.1  C) Oral -- 20 97 %   23 0400 101/72 98.2  F (36.8  C) Oral 96 25 99 %   23 0004 109/68 98.4  F (36.9  C) Oral 89 24 100 %   23 (!) 83/74 98.2  F (36.8  C) Oral 92 24 98 %   23 1600 116/70 98.7  F (37.1  C) Oral -- 18 98 %   23 1200 122/73 -- -- -- -- --   23 1145 130/74 -- -- -- -- --   23 1130 131/68 -- -- -- -- --   23 1115 108/66 99.5  F (37.5  C) Oral -- -- --   23 1100 100/57 -- -- -- -- --   23 1045 104/63 -- -- -- -- --   23 1030 106/62 -- -- -- -- --   23 1015 118/70 -- -- -- -- --   23 1000 111/58 -- -- -- -- --   23 0930 134/80 -- -- -- -- --   23 0910 -- -- -- -- -- 96 %   23 0908 134/85 -- -- -- -- --   23 0907 -- -- -- -- -- (!) 88 %   23 0905 -- -- -- -- -- (!) 82 %   23  0902 -- -- -- -- -- (!) 88 %   05/20/23 0901 -- -- -- -- -- 96 %   05/20/23 0857 -- -- -- -- -- (!) 84 %   05/20/23 0855 -- -- -- -- -- 97 %   05/20/23 0852 -- (!) 102.3  F (39.1  C) Oral -- -- 96 %   05/20/23 0851 -- -- -- -- -- (!) 87 %   05/20/23 0846 -- -- -- -- -- (!) 83 %   05/20/23 0845 -- -- -- -- -- 98 %   05/20/23 0842 137/79 -- -- -- -- --   05/20/23 0840 -- -- -- -- -- 99 %       Recent Results (from the past 24 hour(s))   Hemoglobin    Collection Time: 05/21/23  6:46 AM   Result Value Ref Range    Hemoglobin 8.5 (L) 11.7 - 15.7 g/dL   Extra Red Top Tube    Collection Time: 05/21/23  6:46 AM   Result Value Ref Range    Hold Specimen JI    CBC with platelets and differential    Collection Time: 05/21/23  6:46 AM   Result Value Ref Range    WBC Count 25.5 (H) 4.0 - 11.0 10e3/uL    RBC Count 3.05 (L) 3.80 - 5.20 10e6/uL    Hemoglobin 8.5 (L) 11.7 - 15.7 g/dL    Hematocrit 25.8 (L) 35.0 - 47.0 %    MCV 85 78 - 100 fL    MCH 27.5 26.5 - 33.0 pg    MCHC 32.8 31.5 - 36.5 g/dL    RDW 14.6 10.0 - 15.0 %    Platelet Count 157 150 - 450 10e3/uL    % Neutrophils 81 %    % Lymphocytes 11 %    % Monocytes 6 %    % Eosinophils 1 %    % Basophils 0 %    % Immature Granulocytes 1 %    NRBCs per 100 WBC 0 <1 /100    Absolute Neutrophils 20.9 (H) 1.6 - 8.3 10e3/uL    Absolute Lymphocytes 2.7 0.8 - 5.3 10e3/uL    Absolute Monocytes 1.5 (H) 0.0 - 1.3 10e3/uL    Absolute Eosinophils 0.2 0.0 - 0.7 10e3/uL    Absolute Basophils 0.1 0.0 - 0.2 10e3/uL    Absolute Immature Granulocytes 0.2 <=0.4 10e3/uL    Absolute NRBCs 0.0 10e3/uL               Provider:  SARAH Bragg/ELIZABET          Date:  5/21/2023  Time:  8:37 AM

## 2023-05-21 NOTE — PROVIDER NOTIFICATION
Updated [facility] of hemoglobin drop of 12 to 8.5.   Vitals and assessments are WDL. Pt bleeding has been light. Pt reports feeling well. CNM verbalized understanding. No new orders at this time. Continue to monitor.

## 2023-05-21 NOTE — PROGRESS NOTES
Progress Note    Doing well. No complaints. No PIH symptoms. Pain controlled. Ambulating. Voiding. Denies fevers or chills.     Temp:  [98.2  F (36.8  C)-98.7  F (37.1  C)] 98.7  F (37.1  C)  Pulse:  [89-96] 96  Resp:  [18-25] 20  BP: ()/(59-74) 98/59  SpO2:  [97 %-100 %] 97 %    Abdomen soft, non tender  Uterus firm  No c/c/e    Hgb: 12 --> 8.5    A/P: 28 yo PPD #1 s/p VAVD with 4th degree laceration  -- Triple I - On Amp and Gent. Recommend continue for 24 hours post partum. Temp of 102.3 on 5/20 at 0852. Continue antibiotics for 24 hours post partum.   -- Asymptomatic acute blood loss anemia. Monitor.   -- COVID-19 - Symptomatic control. Isolation.   -- 4th degree laceration - On Colace 100 mg BID    Henrietta Gunderson MD, FACOG  P) [phone]

## 2023-05-21 NOTE — PROGRESS NOTES
Postpartum Progress Note      Patient Name:  Jean Paul Goodman  :      1995  MRN:      [id number]        RN calls to update CNM that Nou started to have very bad stinging pain in her IV site after 1/3 of the Venofer was infused.  Ultimately it was discovered that the IV site was beginning to infiltrate.  Nou declined having a new IV site placed so she could receive the rest of the IV Venofer dose (therefore only had ~100+ ml of the dosing).  IV removed and remaining Venofer disposed of.    Will instead plan to do po iron supplementation.          Provider:  MANDA Bragg          Date:  2023  Time:  6:55 PM

## 2023-05-21 NOTE — PLAN OF CARE
Patient assessments and vitals are WDL. Pain has been adequately controlled by rest. Spouse is supportive of pt. Pt is attentive to infant and active in cares. Pt is formula feeding baby - plan is to pump and bottle. Patient is up ad apple. Voiding without difficulty. Continue to monitor pain.      Problem: Plan of Care - These are the overarching goals to be used throughout the patient stay.    Goal: Optimal Comfort and Wellbeing  Outcome: Progressing  Intervention: Monitor Pain and Promote Comfort  Recent Flowsheet Documentation  Taken 5/21/2023 0800 by Charley Mariano RN  Pain Management Interventions: rest  Intervention: Provide Person-Centered Care  Recent Flowsheet Documentation  Taken 5/21/2023 0800 by Charley Mariano RN  Trust Relationship/Rapport:   care explained   choices provided   questions answered   questions encouraged   thoughts/feelings acknowledged

## 2023-05-21 NOTE — PROGRESS NOTES
"Vaginal Delivery Postpartum Day 1      Patient Name:  Jean Paul Goodman  :      1995  MRN:      [id number]      Professional I-Pad [employer]  used to conduct several visits with patient.       All staff in full PPE since diagnosis of COVID.      Subjective:  Paulineu is doing well.  Able to get out of bed and move around room on her own.  Had a little dizziness yesterday when first getting up, but not dizzy, light-headed, weak, or short of breath today.  Aware of blood loss of nearly 1000 ml , but doesn't feel she is symptomatic of it.  Doesn't feel feverish any longer.  Knows that last antibiotic dosing for treatment of Triple I was this morning.  Voiding without difficulty. Tolerating a normal diet. No BM yet.  Bleeding is decreasing.  Pain is well controlled.  Has decided to solely pump and bottle baby, and supplement with formula as needed.  Uncertain of postpartum contraception plans but will think about her options over the next 6 weeks.  Support at home identified as her spouse, and has family in the area.   Has no history of depression/anxiety/mental health disorder.     Objective:  BP 98/59 (BP Location: Right arm, Patient Position: Supine, Cuff Size: Adult Regular)   Pulse 96   Temp 98.7  F (37.1  C) (Oral)   Resp 20   Ht 1.562 m (5' 1.5\")   Wt 80.8 kg (178 lb 3.2 oz)   LMP 2022 (Exact Date)   SpO2 97%   Breastfeeding Unknown   BMI 33.13 kg/m      General:  Pleasant, articulate, well-nourished female.  Not in any apparent distress.  Breasts:  Breasts soft, non-tender, nipples intact and inverted.  Abdomen:  Soft, non-tender.  Fundus firm @ U -2 to -3, non-tender, midline.  Bleeding:  Minimal rubra lochia, without clots or odor.  Vulva: 4th degree laceration well approximated.  No edema.  No bruising.  Rectal:  No hemorrhoids.  Legs:  Minimal edema.  No varicosities.  Negative demetrice's sign.        Labs:  Recent Results (from the past 24 hour(s))   Hemoglobin    Collection Time: " "23  6:46 AM   Result Value Ref Range    Hemoglobin 8.5 (L) 11.7 - 15.7 g/dL   Extra Red Top Tube    Collection Time: 23  6:46 AM   Result Value Ref Range    Hold Specimen JIC    CBC with platelets and differential    Collection Time: 23  6:46 AM   Result Value Ref Range    WBC Count 25.5 (H) 4.0 - 11.0 10e3/uL    RBC Count 3.05 (L) 3.80 - 5.20 10e6/uL    Hemoglobin 8.5 (L) 11.7 - 15.7 g/dL    Hematocrit 25.8 (L) 35.0 - 47.0 %    MCV 85 78 - 100 fL    MCH 27.5 26.5 - 33.0 pg    MCHC 32.8 31.5 - 36.5 g/dL    RDW 14.6 10.0 - 15.0 %    Platelet Count 157 150 - 450 10e3/uL    % Neutrophils 81 %    % Lymphocytes 11 %    % Monocytes 6 %    % Eosinophils 1 %    % Basophils 0 %    % Immature Granulocytes 1 %    NRBCs per 100 WBC 0 <1 /100    Absolute Neutrophils 20.9 (H) 1.6 - 8.3 10e3/uL    Absolute Lymphocytes 2.7 0.8 - 5.3 10e3/uL    Absolute Monocytes 1.5 (H) 0.0 - 1.3 10e3/uL    Absolute Eosinophils 0.2 0.0 - 0.7 10e3/uL    Absolute Basophils 0.1 0.0 - 0.2 10e3/uL    Absolute Immature Granulocytes 0.2 <=0.4 10e3/uL    Absolute NRBCs 0.0 10e3/uL         Assessment:   -Postpartum Day 1, vacuum assisted vaginal birth with Dr Gandhi  -4th degree laceration - repaired by Dr Gandhi  -Plans pumping and bottling breast milk with formula supplementation PRN  -History of Triple I with IV antibiotics X24 hours  -Anemia (8.5 this morning, and had been 11.2 on admission) related to QBL at birth of 931 ml - Nous is asymptomatic of anemia  -COVID illness - diagnosed 25      Plan:    -New mom handout information reviewed. Discussed [place] care, breast care, pain management, pumping to bring milk supply in, bowel changes, 4th degree laceration care, return of fertility, postpartum exercises, postpartum mood changes and adjustments, postpartum \"baby-blues\" vs.  mood and anxiety disorders, sleep changes, required support.     -Although asymptomatic of anemia, CNM has suggested a dose of IV Venofer today.  " Discussed risks, benefits, alternatives.  Patient accepts and order placed.  Will be given today and then IV site and have saline lock afterward.  Plan oral iron supplementation for 6 weeks after this.    -Plan for today: encouraged rest, skin-to-skin, breastfeeding on cue, adequate pain control, tub soaks, and naps.     -Anticipate two day stay with plan for discharge tomorrow.         Provider:  MANDA Bragg      Date:  5/21/2023  Time:  12:04 PM

## 2023-05-21 NOTE — PLAN OF CARE
COVID precautions in place. Syntricity  used with all cares. Vital signs stable. Up to the bathroom and amublating in the room independently. Declined prn pain medications throughout shift. Ampicillin rescheduled due to loss of IV access. R arm IV established and tolerating antibiotics. Bleeding light, no clots reported, feels like she is emptying bladder. Uterus firm at U. Still felt she had blurry vision at the beginning of the shift, but this has resolved No other symtoms of preeclampsia. Bilateral swelling in arms, hands, legs and feet. Laceration with mild swelling but no pain per patient.  Education given regarding reverse pressure and milk supply.      Problem: Postpartum (Vaginal Delivery)  Goal: Absence of Infection Signs and Symptoms  Outcome: Progressing   Goal Outcome Evaluation:

## 2023-05-22 VITALS
TEMPERATURE: 98.3 F | RESPIRATION RATE: 28 BRPM | BODY MASS INDEX: 31.08 KG/M2 | WEIGHT: 168.9 LBS | HEIGHT: 62 IN | HEART RATE: 91 BPM | SYSTOLIC BLOOD PRESSURE: 116 MMHG | OXYGEN SATURATION: 96 % | DIASTOLIC BLOOD PRESSURE: 80 MMHG

## 2023-05-22 PROBLEM — O99.019 ANEMIA OF PREGNANCY: Status: ACTIVE | Noted: 2023-05-22

## 2023-05-22 PROBLEM — O36.8130 DECREASED FETAL MOVEMENTS IN THIRD TRIMESTER, SINGLE OR UNSPECIFIED FETUS: Status: RESOLVED | Noted: 2023-05-18 | Resolved: 2023-05-22

## 2023-05-22 PROBLEM — O12.03 GESTATIONAL EDEMA IN THIRD TRIMESTER: Status: RESOLVED | Noted: 2023-05-18 | Resolved: 2023-05-22

## 2023-05-22 LAB — T PALLIDUM AB SER QL AGGL: NON REACTIVE

## 2023-05-22 RX ORDER — IBUPROFEN 600 MG/1
600 TABLET, FILM COATED ORAL EVERY 6 HOURS PRN
Qty: 40 TABLET | Refills: 0 | Status: SHIPPED | OUTPATIENT
Start: 2023-05-22 | End: 2024-09-17

## 2023-05-22 RX ORDER — ACETAMINOPHEN 500 MG
500-1000 TABLET ORAL EVERY 6 HOURS PRN
Qty: 60 TABLET | Refills: 0 | Status: SHIPPED | OUTPATIENT
Start: 2023-05-22 | End: 2024-09-17

## 2023-05-22 RX ORDER — SENNA AND DOCUSATE SODIUM 50; 8.6 MG/1; MG/1
1 TABLET, FILM COATED ORAL AT BEDTIME
Qty: 60 TABLET | Refills: 0 | Status: SHIPPED | OUTPATIENT
Start: 2023-05-22 | End: 2024-09-17

## 2023-05-22 ASSESSMENT — ACTIVITIES OF DAILY LIVING (ADL)
ADLS_ACUITY_SCORE: 20

## 2023-05-22 NOTE — DISCHARGE INSTRUCTIONS
[facility] HospitalTalking [facility] GroupHe[facility] number for post-delivery appointment [phone], ext [phone] for scheduling  [email or web address]        Warning Signs after Having a Baby    Keep this paper on your fridge or somewhere else where you can see it.    Call your provider if you have any of these symptoms up to 12 weeks after having your baby.    Thoughts of hurting yourself or your baby  Pain in your chest or trouble breathing  Severe headache not helped by pain medicine  Eyesight concerns (blurry vision, seeing spots or flashes of light, other changes to eyesight)  Fainting, shaking or other signs of a seizure    Call  if you feel that it is an emergency.     The symptoms below can happen to anyone after giving birth. They can be very serious. Call your provider if you have any of these warning signs.    My provider s phone number: _______________________    Losing too much blood (hemorrhage)    Call your provider if you soak through a pad in less than an hour or pass blood clots bigger than a golf ball. These may be signs that you are bleeding too much.    Blood clots in the legs or lungs    After you give birth, your body naturally clots its blood to help prevent blood loss. Sometimes this increased clotting can happen in other areas of the body, like the legs or lungs. This can block your blood flow and be very dangerous.     Call your provider if you:  Have a red, swollen spot on the back of your leg that is warm or painful when you touch it.   Are coughing up blood.     Infection    Call your provider if you have any of these symptoms:  Fever of 100.4 F (38 C) or higher.  Pain or redness around your stitches if you had an incision.   Any yellow, white, or green fluid coming from places where you had stitches or surgery.    Mood Problems (postpartum depression)    Many people feel sad or have mood changes after having a baby. But for some people, these mood swings are worse.     Call your provider  right away if you feel so anxious or nervous that you can't care for yourself or your baby.    Preeclampsia (high blood pressure)    Even if you didn't have high blood pressure when you were pregnant, you are at risk for the high blood pressure disease called preeclampsia. This risk can last up to 12 weeks after giving birth.     Call your provider if you have:   Pain on your right side under your rib cage  Sudden swelling in the hands and face    Remember: You know your body. If something doesn't feel right, get medical help.     For informational purposes only. Not to replace the advice of your health care provider. Copyright 2020 [facility]. All rights reserved. Clinically reviewed by Azalia Garcia, RNC-OB, MSN. YuMe 645237 - Rev 02/23.    Vaginal Delivery Discharge Instructions: Hmong  Ua zog:   Thov tsev neeg thiab phooj ywg pab thaum koj xav tau.  Tsis txhob  ib yam dab tsi nyob yesenia hauv koj qhov chaw mos txog thuam koj tus kws mary lou mob pom zoo.  Ana li so ob peb solis tiam kilo ntej kom koj lub cev thiaj muaj sij hawm rov qab zoo. Thaum txog lub sij hawm ntawd koj ua tau shravan yam raws li koj xav tias koj ua tau.  Tsis txhob tsav tsheb thaum koj noj tshuaj raws li koj tus kws mary lou mob hais. Koj tsav tsheb los tau yog tias koj noj cov tshuaj uas koj yuav kilo kiab khw.    Hu koj tus kws mary lou mob yog tias muaj ib yam nram qab no uas qhia tias koj muaj mob:  Koj cov ntshav ntxaum ib daim ntaub tas ua ntej dhau ib teev, los yog koj pom cov ntshav khov uas loj joes li ib lub pob golf.  Los ntshav ntev tshaj 6 lub solis tiam.  Koj tawm kua hauv chaw mos uas tsw phem.   Ua npaws 100.4 ?F (38 ?C) rov wilfred (uas ntsuas hauv qab koj tus nplaig), tsis hais ua daus no los sis tsis ua daus no  Mob heev, plab khov los sis mob ntawm koj lub plab mog.  Haj yam hnov mob, qhov chaw phais liab liab, o o los sis muaj kua nyob ntawm koj cov xov xaws tawv nqaij.  Yuav tsum patricia zis ntau zaus los yog yuav tsum dim zis  heev, los sis kub kub thaum koj  zis.  Liab liab, o o los sis mob ntawm ib txoj leeg hauv koj txhais ceg.  Muaj teeb meem pub mis, los sis muaj ib qhov chaw liab los yog qhov chaw mob ntawm koj lub mis.  Hnov mob loj zuj zus los sis hnov mob tas li kilo qab tau txiav chaw mos me ntsis los sis koj chaw mos ntuag.   Xeev siab thiab ntuav.  Koj mob hauv siab thiab hnoos los yog nyuaj yesenia koj ua pa.  Muaj teeb meem ntawm maksim tu siab, maksim txhawj xeeb, los yog maksim nyuaj siab.   Yog tias koj ntshai tias koj txhob txwm yuav ua yesenia koj tus kheej los sis tus me nyuam mos liab mob, antonia li hu koj tus kws mary lou mob kiag.   Koj muaj lito nug los yog maksim txhawj xeeb kilo qab koj rov qab mus tsev.  Ntxuav lisa kom huv:  Ntxuav koj lisa ua ntej koj kov koj chaw mos thiab xov xaws tawv nqaij txhua lub sij hawm.  Qhov no yuav pab kom tsis txhob raug kab mob.  Yog tias koj txhais lisa huv lawm, koj siv tau ib daim ntxaum cawv so lisa kom ntxuav koj txhais lisa. Antonia li tu koj yesenia lisa kom luv thiab huv.        Vaginal Delivery Discharge Instructions  Activity:   Ask family and friends for help when you need it.  Do not place anything in your vagina until your doctor approves.  Take it easy for the next few weeks to allow your body to recover. You may do any activities you feel up to at that point.  Do not drive while taking pain pills prescribed by your doctor. You may drive if taking over-the-counter pain pills.    Call your health care provider if you have any of these symptoms:  You soak a sanitary pad with blood within 1 hour, or you see blood clots larger than a golf ball.  Bleeding that lasts more than 6 weeks.  You have vaginal discharge that smells bad.   A fever of 100.4? F (38? C) or higher (temperature taken under your tongue), with or without chills   Severe, pain, cramping or tenderness in your lower belly area.  Increased pain, swelling, redness or fluid around your stitches.  A more frequent or urgent need to urinate (pee), or it  burns when you pee.  Redness, swelling or pain around a vein in your leg.  Problems coping with sadness, anxiety, or depression.   Problems breastfeeding, or a red or painful area on your breast.  Pain that increases or does not go away from an episiotomy or perineal tear.  Nausea and vomiting.  Chest pain and cough or are gasping for air.  If you have any concerns about hurting yourself or the baby, call your doctor right away.   You have questions or concerns after you return home.    Keep your hands clean:  Always wash your hands before touching your perineal area and stitches.  This helps reduce your risk of infection.  If your hands aren t dirty, you may use an alcohol hand-rub to clean your hands. Keep your nails clean and short.

## 2023-05-22 NOTE — PROGRESS NOTES
"Vaginal Delivery Postpartum Day 2    Patient Name:  Jean Paul Goodman  :      1995  MRN:      [id number]      Subjective:  Jean Paul Goodman is feeling well and ready for discharge home.  States she is pumping and bottle feeding baby. Reports voiding without difficulty and vaginal bleeding has decreased.  Denies passing any large clots.  Pain is well controlled with ibuprofen. Plans undecided for postpartum birth control.  Denies any history of anxiety or depression.Patient feeling well and denies any dizziness, shortness of breath or tachycardia when up in room. States she has good family support at home.        Objective:  /80 (Cuff Size: Adult Regular)   Pulse 91   Temp 98.3  F (36.8  C) (Oral)   Resp 28   Ht 1.562 m (5' 1.5\")   Wt 80.8 kg (178 lb 3.2 oz)   LMP 2022 (Exact Date)   SpO2 96%   Breastfeeding Unknown   BMI 33.13 kg/m      Breasts: soft, lactating   Abdomen:soft, non-tender, fundus firm @ 2 below U  Perineum: healing, no edema, lochia small rubra   Extremities: mild edema         Immunization History   Administered Date(s) Administered     TDAP (Adacel,Boostrix) 02/15/2023       Assessment:    -Stable Postpartum Day 2, S/P VAVD and 4th degree laceration    -Normal involution  -Pumping and bottle feeding  -History Triple I with IV antibiotics for 24 hours  -Blood loss anemia, stable   -Covid illness, diagnosed 23    Plan:   -New mom information reviewed and all questions answered.  -Already has breast pump     -Discharge was placed per Dr. Morgan, due to 4th degree perineal laceration. Patient should follow up with OB at 4 weeks postpartum for laceration evaluation per Dr. Morgan order   -Continue oral iron supplement for 6 weeks postpartum   -Follow up in CN clinic in 2 and 6 weeks  -Call with questions/concerns         Provider:  SARAH Stafford, ELIZABET, MYRTLE     Date:  2023  Time:  12:38 PM    Patient Name:  Jean Paul Goodman  :  1995  MRN: "  [id number]

## 2023-05-22 NOTE — DISCHARGE SUMMARY
[facility] Discharge Summary    Jean Paul Goodman MRN# [id number]   Age: 27 year old YOB: 1995     Date of Admission:  5/18/2023  Date of Discharge::  5/22/2023  Admitting Physician:  SARAH Gallego CN  Discharge Physician:  Malinda Morgan MD               Admission Diagnoses:   Encounter for induction of labor [Z34.90]  Postdates pregnancy   Anemia of pregnancy   Language barrier  insufficient prenatal care          Discharge Diagnosis:   Vacuum assisted  vaginal delivery  4th degree laceration  Chorioamnionitis  Acute blood loss anemia - clinically insignificant   Intrauterine pregnancy at 41 weeks gestation          Procedures:   Procedure(s): Vacuum assisted vaginal delivery  IV antiobitcs   induction of labor                    Medications Prior to Admission:     Medications Prior to Admission   Medication Sig Dispense Refill Last Dose     cyanocobalamin (CYANOCOBALAMIN) 1000 MCG tablet Take 1 tablet (1,000 mcg) by mouth daily 60 tablet 1 Past Week     ferrous sulfate (SLO-FE) 142 (45 Fe) MG CR tablet Take 1 tablet (142 mg) by mouth daily 60 tablet 1 Past Week     Prenatal Vit-Fe Fumarate-FA (PRENATAL MULTIVITAMIN W/IRON) 27-0.8 MG tablet Take 1 tablet by mouth daily 90 tablet 3 Past Week     vitamin C (ASCORBIC ACID) 250 MG tablet Take 1 tablet (250 mg) by mouth daily 60 tablet 1 Past Week     vitamin D3 (CHOLECALCIFEROL) 50 mcg (2000 units) tablet Take 2 tablets (100 mcg) by mouth daily 60 tablet 1 Past Week             Discharge Medications:     Current Discharge Medication List      START taking these medications    Details   acetaminophen (TYLENOL) 500 MG tablet Take 1-2 tablets (500-1,000 mg) by mouth every 6 hours as needed  Qty: 60 tablet, Refills: 0    Associated Diagnoses: Vacuum-assisted vaginal delivery      ibuprofen (ADVIL/MOTRIN) 600 MG tablet Take 1 tablet (600 mg) by mouth every 6 hours as needed  Qty: 40 tablet, Refills: 0    Associated Diagnoses:  Vacuum-assisted vaginal delivery      SENNA-docusate sodium (SENNA S) 8.6-50 MG tablet Take 1 tablet by mouth At Bedtime  Qty: 60 tablet, Refills: 0    Associated Diagnoses: Vacuum-assisted vaginal delivery         CONTINUE these medications which have NOT CHANGED    Details   cyanocobalamin (CYANOCOBALAMIN) 1000 MCG tablet Take 1 tablet (1,000 mcg) by mouth daily  Qty: 60 tablet, Refills: 1    Associated Diagnoses: Normal first pregnancy confirmed, antepartum; Supervision of normal first pregnancy, antepartum      ferrous sulfate (SLO-FE) 142 (45 Fe) MG CR tablet Take 1 tablet (142 mg) by mouth daily  Qty: 60 tablet, Refills: 1    Associated Diagnoses: Normal first pregnancy confirmed, antepartum; Supervision of normal first pregnancy, antepartum      Prenatal Vit-Fe Fumarate-FA (PRENATAL MULTIVITAMIN W/IRON) 27-0.8 MG tablet Take 1 tablet by mouth daily  Qty: 90 tablet, Refills: 3    Associated Diagnoses: Normal first pregnancy confirmed, antepartum; Supervision of normal first pregnancy, antepartum      vitamin C (ASCORBIC ACID) 250 MG tablet Take 1 tablet (250 mg) by mouth daily  Qty: 60 tablet, Refills: 1    Associated Diagnoses: Normal first pregnancy confirmed, antepartum; Supervision of normal first pregnancy, antepartum      vitamin D3 (CHOLECALCIFEROL) 50 mcg (2000 units) tablet Take 2 tablets (100 mcg) by mouth daily  Qty: 60 tablet, Refills: 1    Associated Diagnoses: Normal first pregnancy confirmed, antepartum; Supervision of normal first pregnancy, antepartum                   Consultations:   OBGYN for Vacuum assisted vaginal delivery           Brief History of Labor:   Admitted for  induction of labor due to postdates pregnancy  She was started with cervical ripening and progressed to pitocin.  She labored to complete and had Vacuum assisted vaginal delivery due to maternal fever with maternal exhaustion from pushing.  She had a Vacuum assisted vaginal delivery without complications.  There was a  4th degree tear which was repaired in the usual manner.               Hospital Course:   The patient's hospital course was remarkable for maternal fever at time of delivery.  shewas treated with IV antibiotics for 24 hours which resolution of fever. .  On discharge, her pain was well controlled. Vaginal bleeding is similar to peak menstrual flow.  Voiding without difficulty.  Ambulating well and tolerating a normal diet.  No fever.  Breastfeeding well.  Infant is stable.  No bowel movement yet.*  She was discharged on post-partum day #3.    Post-partum hemoglobin:   Hemoglobin   Date Value Ref Range Status   05/21/2023 8.5 (L) 11.7 - 15.7 g/dL Final   05/21/2023 8.5 (L) 11.7 - 15.7 g/dL Final             Discharge Instructions and Follow-Up:   Discharge diet: Regular   Discharge activity: No sex for 6 week(s)   Discharge follow-up: Follow up with Dr. Gandhi  in 4-6 weeks   Wound care:            Discharge Disposition:   Discharged to home      Attestation:  I have reviewed today's vital signs, notes, medications, labs and imaging.    Malinda Morgan MD

## 2023-05-22 NOTE — PLAN OF CARE
COVID precautions in place. [employer]  used with all cares. Vital signs stable. Declined prn pain medications throughout shift.  Bleeding light, no clots reported, feels like she is emptying bladder. Uterus firm at U. Bilateral ankle and food swelling that is improving.  Plan is to discharge home with baby today.      Problem: Plan of Care - These are the overarching goals to be used throughout the patient stay.    Goal: Readiness for Transition of Care  5/22/2023 1050 by Venice Seay, RN  Outcome: Progressing  5/22/2023 0527 by [place] Seay, RN  Outcome: Progressing   Goal Outcome Evaluation:

## 2023-05-22 NOTE — PLAN OF CARE
"Problem: Plan of Care - These are the overarching goals to be used throughout the patient stay.    Goal: Plan of Care Review  Description: The Plan of Care Review/Shift note should be completed every shift.  The Outcome Evaluation is a brief statement about your assessment that the patient is improving, declining, or no change.  This information will be displayed automatically on your shift note.  Outcome: Met  Goal: Patient-Specific Goal (Individualized)  Description: You can add care plan individualizations to a care plan. Examples of Individualization might be:  \"Parent requests to be called daily at 9am for status\", \"I have a hard time hearing out of my right ear\", or \"Do not touch me to wake me up as it startles me\".  Outcome: Met  Goal: Absence of Hospital-Acquired Illness or Injury  Outcome: Met  Goal: Optimal Comfort and Wellbeing  Outcome: Met  Goal: Readiness for Transition of Care  Outcome: Met     Problem: Postpartum (Vaginal Delivery)  Goal: Successful Maternal Role Transition  Outcome: Met  Goal: Hemostasis  Outcome: Met  Goal: Absence of Infection Signs and Symptoms  Outcome: Met  Goal: Anesthesia/Sedation Recovery  Outcome: Met  Goal: Optimal Pain Control and Function  Outcome: Met  Goal: Effective Urinary Elimination  Outcome: Met    Nou's vital signs were WNL. Bleeding is stable. Denies of any headaches, vision changes, or pain in upper right abdomen. Nou was discharged home to her . All education and discharge instructions were given. Nou had no more questions or concerns and understood all instructions that were given. Nou's 2, 4, and 6 week follow up appointments were made prior to discharge and written on discharge summary sheet and reviewed with patient and spouse. All discharge medications were given along with instructions on how and when to take the medications. All questions has been answered. No concerns.  Breast pump was given to patient at discharge. Forms were signed. " Instructions on use was given. Signs and symptoms of when to call MD or 911 was given and reviewed with patient. Patient stated understanding and had no further questions or concerns.

## 2023-05-22 NOTE — PROGRESS NOTES
[facility] RN Care Coordinator Note    Jean Paul Goodman  [id number]  1995    Chart reviewed, discharge follow-up plan discussed with patient's bedside RN, needs assessed. Follow-up appointment with  planned in 4 weeks at [facility] [place] clinic; and postpartum checks with CNM planned in 2 & 6 weeks at [facility].     Patient is reported to have support at home and is ready to discharge today with . RN Care Coordinator will continue to follow and assist with discharge planning as needed.

## 2023-05-22 NOTE — PLAN OF CARE
COVID precautions in place. [place]  used with all cares. Vital signs stable. Declined prn pain medications throughout shift.  Bleeding light, no clots reported, feels like she is emptying bladder. Uterus firm at U. Bilateral swelling in arms, hands, legs and feet. Laceration with mild swelling but no pain per patient.    Problem: Postpartum (Vaginal Delivery)  Goal: Successful Maternal Role Transition  Outcome: Progressing     Problem: Plan of Care - These are the overarching goals to be used throughout the patient stay.    Goal: Readiness for Transition of Care  Outcome: Progressing   Goal Outcome Evaluation:

## 2023-05-24 LAB
PATH REPORT.COMMENTS IMP SPEC: NORMAL
PATH REPORT.COMMENTS IMP SPEC: NORMAL
PATH REPORT.FINAL DX SPEC: NORMAL
PATH REPORT.GROSS SPEC: NORMAL
PATH REPORT.MICROSCOPIC SPEC OTHER STN: NORMAL
PATH REPORT.RELEVANT HX SPEC: NORMAL
PHOTO IMAGE: NORMAL

## 2023-06-04 NOTE — PATIENT INSTRUCTIONS
"\"We hope you had a positive experience and that you can definitely recommend [facility] to your family and friends. You ll be receiving a survey soon, and we look forward to hearing your feedback\".    POSTPARTUM INFORMATION AND RESOURCES:     Congratulations on the birth of your baby. We have gathered together some information on staying healthy in the postpartum time including information on exercise, nutrition and mental health. We have also listed some local and national resources for lactation support and mental health support.     If you have questions or concerns and need to speak with a midwife immediately, please call the on-call midwife at [phone].     If you have a non-emergent question or would like to schedule a follow up appointment, please call the clinic midwife at [phone].     Thank you for sharing your birth experience with the [facility].  Congratulations on the birth of your baby!     ---------------------------------------------------------------------------------------------------------  EXERCISE & NUTRITION:      Getting and Staying Active: A Healthy You!   -Why should I exercise? Exercise, being physically active, is very important for all women. Being active can help you:   Lose or maintain weight   Have more energy   Sleep better   Enjoy sex more   Relieve stress and think better   Lower the chance you will have heart disease, high blood pressure, and diabetes   Strengthen your bones and muscles   Have fewer hot flashes if you are older   -How active do I have to be to get the bene?ts of exercise?  Studies show that as little as 15 minutes of moderate exercise--like fast walking or dancing--3 times a week can improve the health of your heart. Ifyou want to get the best benefits from exercise, try to increase your physical activity to at least 30 minutes, 5 times a week. If you have a serious health problem,be sure to talk " with your health care provider before starting an exercise program.   [email or web address]/  Http://www.Anne [email or web address]/     [email or web address]  [email or web address]  [email or web address]     Taking Care of your Health: Health Care Maintenance Screening recommendations   In all the things women do, taking care of everything and everybody else, they sometimes forget to take care of themselves. This handout reviews the health screenings and vaccines that are recommended for women of all ages. Talk with yourhealth care provider about which tests and vaccines you need. The chart below lists the screening tests and vaccinations recommended for healthy women who do not have a high risk for most diseases.   The recommendations in thischart are guidelines only. For some tests and vaccines, the chart says you should talk to your health care provider.This is because the best recommendations for you depend on your personal health history. Your health care provider may suggest more frequent testing or additional tests ifyou havea higher chance of getting some diseases      Planning Your Family: Developing a Reproductive Life Plan   Planning ahead can help you avoid getting pregnant when you don t want to be pregnant and also be in good health if and when you do decide to become pregnant. Many women have at least one pregnancy in their lives, even if it was not planned. In fact, about half of all pregnancies are not planned. Getting pregnant when you did not plan it can be a problem, or it can turn into a happy event. Planning pregnancy leads to healthier pregnancies, healthier mothers, and healthier families.   Although many women want to have a family, not everyone wants to have children. More and more women are childless by choice (also known as childfree). Whether to have children is a personal choice that only you can make. It s okay not to want children! If you never want to get pregnant, it is important to make sure you  "always use very effective birth control, such as an intrauterine device, the birth control implant, female sterilization (having your tubes tied), or your partner having a vasectomy.      Reliable resources:   ?   American College of Nurse-Midwives (ACNM) http://www.midwife.org/; look at the informational handouts at http://www.midwife.org/Share-With-Women   ??   Women's Health.gov:   http://www.womenshealth.gov/a-z-topics/index.html   FDA - Nutrition ?www.mypyramid.gov? Under \"For Consumers,\" click on \"pregnant and breastfeeding women.\"   ?   Vaccines : Centers for Disease Control and Prevention (CDC) http://www.cdc.gov/vaccines/   ?   [facility] www.Digerati.Surface Logix   ?   When researching information on the web, question the validity of websites.? The domains .gov, Submittable and.org tend to be more reliable information.? If there are a lot of advertisements, be cautious of the information provided. Stay away from blogs and chat rooms please!   ?   ?   Nutrition and supplements:   Daily multivitamin vitamin (with 400 - 1000 mcg folic acid).? Take with food as needed.   ?   4-5 servings of dairy or other calcium rich foods (fish, leafy greens, soy)?per day - if not, take 500-1000 mg additional calcium (Tums, pills, chews). Take with dairy.   ?   Vitamin D3 4000 IU geltab daily. Vitamin D rich foods: Cod Liver Oil (1Tbsp), Panther Burn, Mackerel, Tuna, fortified milk and yogurt, Beef and liver, sardines, egg, fortified cereals, swiss cheese.? Take supplements with fattiest meal.?   ?   2-3 (4) oz servings of fish, seafood, nuts (walnuts & almonds), oils, avocado per week - if not, take Omega 3 Fatty acids: DHA & CHAPIS 5974-8723 mg per day. ?Other names: cod liver oil, fish oil. Take with fattiest meal.   ?   ?---------------------------------------------------------------------------------------------------------  POSTPARTUM & LACTATION RESOURCES :         Early Childhood Family Education [place] [address] provides a free, drop-in " "class/breastfeeding support group, facilitated by a lactation consultant and .  During the group you can connect with other parents, weigh your baby, ask questions about feeding and baby development, and more.  You do not need to register.    in-person meetings will begin on , are for parents of babies from birth to 9 months, and will meet on Monday evenings from 6 - 7:15 pm in [facility], which is at [address] [facility].  [place] [address] also offers virtual group meetings with a lactation consultant/.  These take place on , from 11:30 am - 12:30 pm for parents of newborns to 3-month-olds, and from 4:15 - 5:15 for parents of 3 - 9 - month olds.  To get the meeting link contact Jocelyne Leach at [phone].    [facility] offers a free, drop-in breastfeeding support group facilitated by TERESA Herrera.   at [facility] [address], [place].  A scale is available to check baby weights, if desired.  [facility] also has a variety of new parent classes:  (cost for registration)  https://[employer]/classes/    [facility] Lactation [facility] [place] facilitated by TERESA Tilley:  Free, drop-in support group for breastfeeding, with baby scale available if needed.  Meets at [facility], second Tuesday of each month, 10 am - 12 pm.  Text [phone] for info.    Lat Cafe Support Group,  at 10:30 am   Run by TERESA Chin of [facility]   Go to [facility] Facebook page, click on \"events\" for link:   Https://www.[email or web address]/    [facility] breastfeeding support community:  free, drop-in breastfeeding support facilitated by Certified Lactation Counselors, open  and  from 1 pm - 5 pm.  In [place]:  Guiding [facility], [address] [facility]:   guidingarPaulding County Hospitalju.[facility]    Health " [facility] Milk Hour, Thursdays at 2:30 pm    Run by TERESA Simms  Go to [facility] FB page and send message to get link   Https://www.BrandBeau.Rawbots/healthSkyeTekundations/    Charli Campa:  http://www.Beach[employer] Ex[employer] USAas.org/    [facility] offers a Lactation Lounge every Friday 12pm - 1pm, run by Charli Lora Leader.  Sign up via link at Tecogen/cbe-lactation   https://www.Tecogen/cbe-lactation    [facility] is offering virtual support groups every Monday, 10:30 am - 12 pm, run by RN IBCLC: Https://www.BrandBeau.com/events/[email or web address]/    Culturally-Specific Breastfeeding Support:     For Hmong Families:   The Hmong Breastfeeding Coalition is a wonderful support for [place] Hmong women who are breastfeeding.  They are best found on Facebook.    for Black families:    Chocolate Milk Club:  http://www.EcoStart/chocolate-milk-club/  Email: Demi@[email or web address].com    R.O.S.E. = Reaching our Sisters Everywhere  Http://www.breastfeedingrose.org/    Club Mom breastfeeding support for Black mothers:  Contact Travis Guzman  Phone: [phone]   Email:  [email or web address].     Riya Bourgeois  Phone: [phone]   Email:  [email or web address].    Club Dad parent support for Black fathers:   Contact Eder Earl   Phone: [phone]   Email:  [email or web address].    For Native/Indigenous Families:    https://www.BrandBeau.com/groups/evelyn.gimashkikinaan        OUTPATIENT LACTATION RESOURCES   -Schedule an appointment with a [facility] midwife who is also a Lactation Consultant by calling [phone].  Visits on Tuesdays, Wednesdays and Thursdays at multiple locations.  Call: [phone].       - Information on medication use while breastfeeding: http://toxnet.nlm.nih.gov/newtoxnet/lactmed.htm      Other Online Breastfeeding Resources:   BreastfeedingmadesatHomestarsleCONSTRVCTcom  "  Tristanli.org (La Leche League)   Normalfed.com   Womenshealth.gov/breastfeeding   Workandpump.com   [facility].ENJORE  https://Romotive/abcs/   Click on \"Learn More About Attachment\"     -New Parent Connection Drop-In:  In collaboration with Isaac, Early Childhood Family Education (ECFE), a program of [address], offers ongoing classes for new parents in their infants.  The connection classes offer support and resources to parents during the exciting and challenging period of transition following the birth of her baby.  Parents and babies (birth to 6 months of age) may join the group at any time.  Baby's birth to 3 months meet , from 1230 to 1:30 PM  Babies 3-6 months meet , from 4 to 5 PM     -[place] Mama Group: www.U-Planner.com/free-new-mama-group  -Attend Hallspot New DidLoga. Groups located at three locations:  [facility] and [place]. Sign up online.         Additional Resources:?   -American College of Nurse-Midwives (ACNM) http://www.midwife.org/; look at the informational handouts at http://www.midwife.org/Share-With-Women  www.mymidwife.org   ?   -Women's Health.gov:   http://www.womenshealth.gov/a-z-topics/index.html   ?   -Early Childhood and Family Education (ECFE):   ECFE offers parents hands-on learning experiences that will nourish a lifetime of teachable moments.   http://ecfe.info/ecfe-home/         -----------------------------------------------------------------------------------------------------------   (Before, during and after pregnancy)   MOOD DISORDER RESOURCES :  Are you feeling sad or depressed?   Do you feel more irritable or angry with those around you?   Are you having difficulty bonding with your baby?   Do you feel anxious or panicky?   Are you having problems with eating or sleeping?   Are you having upsetting thoughts that you can t get out of your mind?   Do you feel as if you are  out of control  or "  going crazy ?   Do you feel like you never should have become a mother?   Are you worried that you might hurt your baby or yourself?     Any of these symptoms, and many more, could indicate that you have a form of  mood or anxiety disorder, such as postpartum depression. While many women experience some mild mood changes during or after the birth of a child, 15 to 20% of women experience more significant symptoms of depression or anxiety. Please know that with informed care you can prevent a worsening of these symptoms and can fully recover. There is no reason to continue to suffer.  Women of every culture, age, income level and race can develop  mood and anxiety disorders. Symptoms can appear any time during pregnancy and the first 12 months after childbirth. There are effective and well-researched treatment options to help you recover. Although the term  postpartum depression  is most often used, there are actually several forms of illness that women may experience.     Resources:     -Pregnancy and Postpartum Support [place]: www.[facility].org  Who We Are: We are a group of mental health &  practitioners, service organizations, and mother volunteers who provides services to those struggling with a pregnancy, loss, or postpartum mood disorder through the Helpline, professional training, our resource list and website.  What We Do: We provide support, advocacy, awareness, and training about  mental health in [place].      Community Resource List:   This is a list of  resources within our community.   http://[facility] Children's Hospitalupportmn.org/communityresourcelist    PSYCHOTHERAPISTS/CONSULTANTS   This is a list of licensed mental health professionals who have advanced clinical skills in the treatment of postpartum mood and anxiety disorders (PMADs).   Http://ppsupportmn.org/mentalhealthproviderresourcelist   INTEGRATIVE MEDICINE PRACTITIONERS:   This is a list of licensed  "providers who practice Integrative Medicine: a form of treatment that combines alternative medicine with evidence based medicine in an effort to treat the  whole person  (the person, not just the disease).   http://[facility].org/integrativemedicineproviders    PSYCHIATRIC CARE   This is a list of licensed Psychiatrists who have advanced clinical skills in the treatment and medication management for PMADs and lactating mothers.   Http://[facility].org/psychiatryproviderresroucelist   Click on the links below to find detailed profiles and contact information of providers who have been screened and approved as having advanced training in the area of PMADs. Please note: [facility] does not endorse a specific provider.   The list is in alphabetical order by city. You can also search for providers by city or [facility] code using the search box. Please click on the \"Show\" box to the upper right to advance to the next page. You may also call our Helpline at [phone] if you would like for our Helpline volunteer to find a provider for you.     -Postpartum Depression Support Group:   Weekly groups at no cost through [facility], , 1:30-3:00 p.m., at [facility], [address] [facility], [address]. [place], , 1:30-3:00 p.m., at [facility], [address] To register for the group or get more information, call [phone].   -Postpartum Depression Support Group:   Outpatient Intensive Treatment program for women with  mood disorders [facility]     -[facility]  Resource Guide      Women s Mental Health at [facility]  This website provides a range of current information including discussion of new research findings in women s mental health and how such investigations inform day-to-day clinical practice. Despite the growing number of studies being conducted in " women s health, the clinical implications of such work are frequently controversial, leaving patients with questions regarding the most appropriate path to follow. Providing these resources to patients and their doctors so that individual clinical decisions can be made in a thoughtful and collaborative fashion dovetails with the mission of our Center.     https://[facility].org/        If you re having thoughts of harming yourself or your baby, it is vital to get support immediately. Call 911 or go to the nearest E.R.     TOLL-FREE / NATIONWIDE EMERGENCY ASSISTANCE   Immediate Emergency:  911   National Suicide Prevention Lifeline:   1-271.915.7529   Suicide Prevention Hotline:   9-394-UCZDSXF   [place] Postpartum Depression Hotline:   1-391-PPD-MOMS   Postpartum Support International (PSI)   PPD Helpline: (not a 24-hour hotline)   1-246.463.2847

## 2023-06-04 NOTE — PROGRESS NOTES
Assessment:   Jean Paul Goodman is a 27 year old female here for postpartum follow up at 2 weeks postpartum.   VAVD for Triple I   S/p 4th degree perineal laceration-  Healing well  S/p PPH     Plan:   1. Lab: Hgb today.   2. Breastmilk supply and demand reviewed and encouraged to increase the frequency of pumping to increase milk production. Reviewed outpatient lactation resources within [facility] and pt declines assistance with making an appointment at this time.   3. Continue postpartum cares to promote uncomplicated recovery.   4. Contraceptive counseling done and reviewed methods compatible with lactation. Optimal child spacing discussed. Plan to discuss as a couple and discuss further at next visit.   5. Follow-up at 6 weeks postpartum for routine visit or sooner as needed.    20 minutes spent on the date of the encounter doing chart review, review of test results, patient visit and documentation     Marita Reich, SARAH, CNM, IBCLC    Subjective:    Jean Paul Goodman is a 27 year old female who presents for a postpartum follow up visit. Visit completed with phone . She presents with her partner Bala and baby [place]. She is 2 weeks postpartum following a spontaneous vaginal delivery. The delivery was at 41 gestational weeks. Outcome: spontaneous vaginal delivery. I have fully reviewed the prenatal and intrapartum course. Birth experience reviewed and pt reports she feels well cared for. Offers no questions about her birth experience. Placental pathology reviewed which confirmed Triple I.      The amount and color of the lochia is appropriate for the duration of recovery. The patient feels well. The baby is well and being fed with pumped breast milk and formula supplements. Pumping 3-4 times in 24 hours.    Voiding without difficulty, lochia normal, tolerating normal diet, and passing flatus. Denies constipation. Having daily soft bowel movements. She is taking a stool  softer and iron pill daily. Accept hgb check today.     Pain is well controlled with current medications.  The patient has no emotional concerns.  Postpartum depression screening score: 1, 0 to #10.     No intercourse since birth. No method of birth control identified at this time. Would like to conceive again in 1 year.     The following portions of the patient's history were reviewed and updated as appropriate: allergies, current medications and problem list     Review of Systems  General:  Denies problem  Eyes: Denies problem  Ears/Nose/Throat: Denies problem  Cardiovascular: Denies problem  Respiratory:  Denies problem  Gastrointestinal:  Denies problem, Genitourinary: Denies problem  Musculoskeletal:  Denies problem  Skin: Denies problem  Neurologic: Denies problem  Psychiatric: Denies problem  Endocrine: Denies problem  Heme/Lymphatic: Denies problem   Allergic/Immunologic: Denies problem    Objective:      Vitals:    06/05/23 1121   BP: 100/62   Weight: 68.5 kg (151 lb)     Physical Exam:  General Appearance: Pleasant, articulate, well-groomed, well-nourished female.  Not in any apparent distress.   Abdomen: Soft, non-tender. No masses.     Perineum: External genitalia normal without lesions or irritation. Vagina and cervix show no lesions, inflammation, discharge or tenderness. 4th degree perineal laceration well approximated and healing. No erythema or edema noted.   Extremities: Extremities normal without varicosities or edema

## 2023-06-05 ENCOUNTER — VIRTUAL VISIT (OUTPATIENT)
Dept: INTERPRETER SERVICES | Facility: CLINIC | Age: 28
End: 2023-06-05
Payer: COMMERCIAL

## 2023-06-05 ENCOUNTER — OFFICE VISIT (OUTPATIENT)
Dept: MIDWIFE SERVICES | Facility: CLINIC | Age: 28
End: 2023-06-05
Payer: COMMERCIAL

## 2023-06-05 VITALS — BODY MASS INDEX: 28.07 KG/M2 | WEIGHT: 151 LBS | DIASTOLIC BLOOD PRESSURE: 62 MMHG | SYSTOLIC BLOOD PRESSURE: 100 MMHG

## 2023-06-05 DIAGNOSIS — R58 HEMORRHAGE: ICD-10-CM

## 2023-06-05 DIAGNOSIS — O12.03 GESTATIONAL EDEMA IN THIRD TRIMESTER: ICD-10-CM

## 2023-06-05 DIAGNOSIS — O99.019 ANTEPARTUM ANEMIA: ICD-10-CM

## 2023-06-05 PROBLEM — Z34.90 ENCOUNTER FOR INDUCTION OF LABOR: Status: RESOLVED | Noted: 2023-05-18 | Resolved: 2023-06-05

## 2023-06-05 PROBLEM — O26.03 EXCESSIVE WEIGHT GAIN DURING PREGNANCY IN THIRD TRIMESTER: Status: RESOLVED | Noted: 2023-03-08 | Resolved: 2023-06-05

## 2023-06-05 PROBLEM — Z13.79 GENETIC SCREENING: Status: RESOLVED | Noted: 2022-11-01 | Resolved: 2023-06-05

## 2023-06-05 PROBLEM — O48.0 POST-TERM PREGNANCY, 40-42 WEEKS OF GESTATION: Status: RESOLVED | Noted: 2023-05-18 | Resolved: 2023-06-05

## 2023-06-05 LAB
ALBUMIN SERPL BCG-MCNC: 3.9 G/DL (ref 3.5–5.2)
ALP SERPL-CCNC: 126 U/L (ref 35–104)
ALT SERPL W P-5'-P-CCNC: 17 U/L (ref 10–35)
ANION GAP SERPL CALCULATED.3IONS-SCNC: 12 MMOL/L (ref 7–15)
AST SERPL W P-5'-P-CCNC: 30 U/L (ref 10–35)
BILIRUB SERPL-MCNC: 0.3 MG/DL
BUN SERPL-MCNC: 23.2 MG/DL (ref 6–20)
CALCIUM SERPL-MCNC: 9 MG/DL (ref 8.6–10)
CHLORIDE SERPL-SCNC: 110 MMOL/L (ref 98–107)
CREAT SERPL-MCNC: 0.64 MG/DL (ref 0.51–0.95)
DEPRECATED HCO3 PLAS-SCNC: 20 MMOL/L (ref 22–29)
ERYTHROCYTE [DISTWIDTH] IN BLOOD BY AUTOMATED COUNT: 15.1 % (ref 10–15)
GFR SERPL CREATININE-BSD FRML MDRD: >90 ML/MIN/1.73M2
GLUCOSE SERPL-MCNC: 94 MG/DL (ref 70–99)
HCT VFR BLD AUTO: 34.5 % (ref 35–47)
HGB BLD-MCNC: 11.5 G/DL (ref 11.7–15.7)
MCH RBC QN AUTO: 28.5 PG (ref 26.5–33)
MCHC RBC AUTO-ENTMCNC: 33.3 G/DL (ref 31.5–36.5)
MCV RBC AUTO: 86 FL (ref 78–100)
PLATELET # BLD AUTO: 403 10E3/UL (ref 150–450)
POTASSIUM SERPL-SCNC: 3.5 MMOL/L (ref 3.4–5.3)
PROT SERPL-MCNC: 7.3 G/DL (ref 6.4–8.3)
RBC # BLD AUTO: 4.03 10E6/UL (ref 3.8–5.2)
SODIUM SERPL-SCNC: 142 MMOL/L (ref 136–145)
WBC # BLD AUTO: 7.3 10E3/UL (ref 4–11)

## 2023-06-05 PROCEDURE — 85027 COMPLETE CBC AUTOMATED: CPT | Performed by: ADVANCED PRACTICE MIDWIFE

## 2023-06-05 PROCEDURE — 80053 COMPREHEN METABOLIC PANEL: CPT | Performed by: ADVANCED PRACTICE MIDWIFE

## 2023-06-05 PROCEDURE — 36415 COLL VENOUS BLD VENIPUNCTURE: CPT | Performed by: ADVANCED PRACTICE MIDWIFE

## 2023-06-05 ASSESSMENT — ANXIETY QUESTIONNAIRES
3. WORRYING TOO MUCH ABOUT DIFFERENT THINGS: NOT AT ALL
5. BEING SO RESTLESS THAT IT IS HARD TO SIT STILL: NOT AT ALL
1. FEELING NERVOUS, ANXIOUS, OR ON EDGE: NOT AT ALL
2. NOT BEING ABLE TO STOP OR CONTROL WORRYING: NOT AT ALL
7. FEELING AFRAID AS IF SOMETHING AWFUL MIGHT HAPPEN: NOT AT ALL
GAD7 TOTAL SCORE: 0
6. BECOMING EASILY ANNOYED OR IRRITABLE: NOT AT ALL
GAD7 TOTAL SCORE: 0
IF YOU CHECKED OFF ANY PROBLEMS ON THIS QUESTIONNAIRE, HOW DIFFICULT HAVE THESE PROBLEMS MADE IT FOR YOU TO DO YOUR WORK, TAKE CARE OF THINGS AT HOME, OR GET ALONG WITH OTHER PEOPLE: NOT DIFFICULT AT ALL

## 2023-06-05 ASSESSMENT — PATIENT HEALTH QUESTIONNAIRE - PHQ9: 5. POOR APPETITE OR OVEREATING: NOT AT ALL

## 2023-06-13 ENCOUNTER — TELEPHONE (OUTPATIENT)
Dept: PEDIATRICS | Facility: CLINIC | Age: 28
End: 2023-06-13
Payer: COMMERCIAL

## 2023-06-13 NOTE — TELEPHONE ENCOUNTER
Mother seen with infant for pediatric visit.  Mother at risk for postpartum depression. Reports she has minimal help at home.  No thoughts of self harm or harm to others.    Will send to midwifery team as an FYI and to follow up with patient's postpartum mood.    Franky Oswald, APRN, CPNP, IBCLC  [facility]  [facility]  6/13/2023, 12:05 PM

## 2023-07-02 PROBLEM — O09.33 INSUFFICIENT PRENATAL CARE IN THIRD TRIMESTER: Status: RESOLVED | Noted: 2023-02-15 | Resolved: 2023-07-02

## 2023-07-02 NOTE — PROGRESS NOTES
6-week Postpartum Visit:     Assessment:   Normal postpartum exam at ~ 6 weeks.  Lactating and formula feeding  Contraceptive counseling  S/p 4th degree laceration, healing well     Plan:   1. Adjustment to parenting, self care and importance of a support system discussed. Postpartum education given including: postpartum mood changes and postpartum depression. Written info on  PPD in [facility] shared via AVS.   2. Return of fertility and optimal child spacing discussed. At this time remains unsure and is open to more children. Barrier method such as condoms recommended for contraception. Education given on this method. Resumption of intercourse reviewed with possible changes in libido and vaginal lubrication while nursing.  3. Discussed resumption of exercise and normal timing of return to pre-pregnancy weight. Postpartum physical activity reviewed and encouraged modified abdominal crunches and Kegels daily. Encouraged integrating exercise, such as walking 20-30mns daily.   4.  Nutrition and supplements reviewed.     5. Reviewed postpartum warning signs.  6. Outpatient breastfeeding supports discussed. Discussed [facility] breastfeeding coalition resource and info shared via handout. Pt states she uses [employer] and will attempt to connect that way.   7. Labs today: Hgb d/t hx of postpartum hemorrhage.     -RTC for routine health maintenance or sooner as needed.     SARAH Armando, ELIZABET    Subjective:   Visit completed with telephone . Sundar is a 27 year old female who presents for postpartum visit. She is 6 weeks postpartum following a VASVD.  I have fully reviewed the prenatal and intrapartum course. The delivery was at Term Her baby boy is named Jaya and weighed 8 lbs 3 oz at birth. Postpartum course has been stable. Baby's course has been stable. Baby is breastfeeding 3 times and offering a bottle of formula the other feedings. Her goal is to continue to offer breastfeeding as long as baby will take the  milk. Again, offered outpatient lactation visit and patient declines.     Lochia ceased at 5 weeks postpartum.  Bowel function is normal. Bladder function is normal. She has not resumed intercourse. Desired contraception: unsure at this time. Appetite is normal. Reports sleeping fairly well. [place]  Depression Scale postpartum depression screening score: 4, 0 to #10. Denies feeling overly worried or down emotionally. Feels like herself and that she has been coping as well as usual, though admits she does not have any support aside from her . 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 She has not resumed regular exercise.     Review of Systems:  -A 12 point comprehensive review of systems was negative except as noted above.  -Prenatal history and intrapartum course were also reviewed today.    Cervical Cancer Screening History:  Last Pap: 10/2022. Results were: NILM.     Objective:     Vitals:    23 1123   BP: 110/60   Pulse: 76   Weight: 71.7 kg (158 lb)     Physical Exam:  General Appearance: Pleasant, articulate, well-groomed, well-nourished female.  Not in any apparent distress.   Breasts: Engorgement resolved/Lactating.  Nipples intact with no cracking.  Abdomen: Soft, non-tender. No masses.  2 fb diastasis present.   Pelvic: External genitalia normal without lesions or irritation. Vagina and cervix show no lesions, inflammation, discharge or tenderness. 4th degree perineal laceration well approximated and well healed. Uterus fully involuted.  No adnexal mass or tenderness.    Extremities: Extremities normal without varicosities or edema

## 2023-07-03 ENCOUNTER — MEDICAL CORRESPONDENCE (OUTPATIENT)
Dept: HEALTH INFORMATION MANAGEMENT | Facility: CLINIC | Age: 28
End: 2023-07-03

## 2023-07-03 ENCOUNTER — PRENATAL OFFICE VISIT (OUTPATIENT)
Dept: MIDWIFE SERVICES | Facility: CLINIC | Age: 28
End: 2023-07-03
Payer: COMMERCIAL

## 2023-07-03 VITALS
HEART RATE: 76 BPM | DIASTOLIC BLOOD PRESSURE: 60 MMHG | SYSTOLIC BLOOD PRESSURE: 110 MMHG | WEIGHT: 158 LBS | BODY MASS INDEX: 29.37 KG/M2

## 2023-07-03 DIAGNOSIS — Z37.9 VACUUM-ASSISTED VAGINAL DELIVERY: ICD-10-CM

## 2023-07-03 DIAGNOSIS — R58 HEMORRHAGE: ICD-10-CM

## 2023-07-03 PROBLEM — Z34.00 SUPERVISION OF NORMAL FIRST PREGNANCY, ANTEPARTUM: Status: RESOLVED | Noted: 2022-10-19 | Resolved: 2023-07-03

## 2023-07-03 PROBLEM — O99.019 ANEMIA OF PREGNANCY: Status: RESOLVED | Noted: 2023-05-22 | Resolved: 2023-07-03

## 2023-07-03 PROBLEM — O41.1230 CHORIOAMNIONITIS IN THIRD TRIMESTER: Status: RESOLVED | Noted: 2023-05-20 | Resolved: 2023-07-03

## 2023-07-03 LAB — HGB BLD-MCNC: 11.7 G/DL (ref 11.7–15.7)

## 2023-07-03 PROCEDURE — 36415 COLL VENOUS BLD VENIPUNCTURE: CPT | Performed by: ADVANCED PRACTICE MIDWIFE

## 2023-07-03 PROCEDURE — 85018 HEMOGLOBIN: CPT | Performed by: ADVANCED PRACTICE MIDWIFE

## 2023-07-03 PROCEDURE — 99207 PR POST PARTUM EXAM: CPT | Performed by: ADVANCED PRACTICE MIDWIFE

## 2024-07-28 ENCOUNTER — HEALTH MAINTENANCE LETTER (OUTPATIENT)
Age: 29
End: 2024-07-28

## 2024-09-17 ENCOUNTER — OFFICE VISIT (OUTPATIENT)
Dept: FAMILY MEDICINE | Facility: CLINIC | Age: 29
End: 2024-09-17
Payer: COMMERCIAL

## 2024-09-17 VITALS
HEART RATE: 95 BPM | DIASTOLIC BLOOD PRESSURE: 62 MMHG | SYSTOLIC BLOOD PRESSURE: 112 MMHG | HEIGHT: 61 IN | RESPIRATION RATE: 18 BRPM | BODY MASS INDEX: 28.43 KG/M2 | WEIGHT: 150.6 LBS | OXYGEN SATURATION: 98 % | TEMPERATURE: 97.8 F

## 2024-09-17 DIAGNOSIS — Z13.220 SCREENING FOR HYPERLIPIDEMIA: ICD-10-CM

## 2024-09-17 DIAGNOSIS — R74.8 ELEVATED ALKALINE PHOSPHATASE LEVEL: ICD-10-CM

## 2024-09-17 DIAGNOSIS — Z00.00 ROUTINE HISTORY AND PHYSICAL EXAMINATION OF ADULT: ICD-10-CM

## 2024-09-17 DIAGNOSIS — D72.829 LEUKOCYTOSIS, UNSPECIFIED TYPE: ICD-10-CM

## 2024-09-17 DIAGNOSIS — Z13.1 SCREENING FOR DIABETES MELLITUS: ICD-10-CM

## 2024-09-17 DIAGNOSIS — N89.8 VAGINAL DISCHARGE: Primary | ICD-10-CM

## 2024-09-17 LAB
CLUE CELLS: ABNORMAL
ERYTHROCYTE [DISTWIDTH] IN BLOOD BY AUTOMATED COUNT: 13.1 % (ref 10–15)
HCT VFR BLD AUTO: 38.7 % (ref 35–47)
HGB BLD-MCNC: 13.3 G/DL (ref 11.7–15.7)
MCH RBC QN AUTO: 28.7 PG (ref 26.5–33)
MCHC RBC AUTO-ENTMCNC: 34.4 G/DL (ref 31.5–36.5)
MCV RBC AUTO: 84 FL (ref 78–100)
PLATELET # BLD AUTO: 275 10E3/UL (ref 150–450)
RBC # BLD AUTO: 4.63 10E6/UL (ref 3.8–5.2)
TRICHOMONAS, WET PREP: ABNORMAL
WBC # BLD AUTO: 13.7 10E3/UL (ref 4–11)
WBC'S/HIGH POWER FIELD, WET PREP: ABNORMAL
YEAST, WET PREP: ABNORMAL

## 2024-09-17 PROCEDURE — 80053 COMPREHEN METABOLIC PANEL: CPT

## 2024-09-17 PROCEDURE — 80061 LIPID PANEL: CPT

## 2024-09-17 PROCEDURE — 36415 COLL VENOUS BLD VENIPUNCTURE: CPT

## 2024-09-17 PROCEDURE — 87591 N.GONORRHOEAE DNA AMP PROB: CPT

## 2024-09-17 PROCEDURE — 85027 COMPLETE CBC AUTOMATED: CPT

## 2024-09-17 PROCEDURE — 87210 SMEAR WET MOUNT SALINE/INK: CPT

## 2024-09-17 PROCEDURE — 87491 CHLMYD TRACH DNA AMP PROBE: CPT

## 2024-09-17 PROCEDURE — T1013 SIGN LANG/ORAL INTERPRETER: HCPCS | Mod: U4

## 2024-09-17 PROCEDURE — 99385 PREV VISIT NEW AGE 18-39: CPT

## 2024-09-17 RX ORDER — PRENATAL VIT/IRON FUM/FOLIC AC 27MG-0.8MG
1 TABLET ORAL DAILY
Qty: 90 TABLET | Refills: 3 | Status: CANCELLED | OUTPATIENT
Start: 2024-09-17

## 2024-09-17 ASSESSMENT — PAIN SCALES - GENERAL: PAINLEVEL: NO PAIN (0)

## 2024-09-17 NOTE — LETTER
September 19, 2024      Jean Paul Goodman  [address]  [place] [address]        Dear ,    We are writing to inform you of your test results.    CBC with hemoglobin within normal range. WBC (white cell count) elevated at 13.7. Should have you come back in 1 week to have CBC rechecked. Please call lab for lab only visit- someone from the clinic should have already called you about this.     Comprehensive metabolic panel (electrolytes, kidney function, liver function) within normal range. Slight decrease in creatinine (kidney function), will continue to monitor.     Lipid panel mostly within range. Slight elevation of triglycerides which could be related to not fasting prior to lab. No concerns.     Wet prep negative for trichomonas, yeast, bacterial vaginosis.    Chlamydia and gonorrhea testing negative.    Resulted Orders   Wet prep - Clinic Collect   Result Value Ref Range    Trichomonas Absent Absent    Yeast Absent Absent    Clue Cells Absent Absent    WBCs/high power field 4+ (A) None   Chlamydia trachomatis/Neisseria gonorrhoeae by PCR   Result Value Ref Range    Chlamydia Trachomatis Negative Negative      Comment:      Negative for C. trachomatis rRNA by transcription mediated amplification.   A negative result by transcription mediated amplification does not preclude the presence of infection because results are dependent on proper and adequate collection, absence of inhibitors and sufficient rRNA to be detected.    Neisseria gonorrhoeae Negative Negative      Comment:      Negative for N. gonorrhoeae rRNA by transcription mediated amplification. A negative result by transcription mediated amplification does not preclude the presence of C. trachomatis infection because results are dependent on proper and adequate collection, absence of inhibitors and sufficient rRNA to be detected.   Comprehensive metabolic panel   Result Value Ref Range    Sodium 136 135 - 145 mmol/L     Potassium 3.7 3.4 - 5.3 mmol/L    Carbon Dioxide (CO2) 22 22 - 29 mmol/L    Anion Gap 12 7 - 15 mmol/L    Urea Nitrogen 8.2 6.0 - 20.0 mg/dL    Creatinine 0.47 (L) 0.51 - 0.95 mg/dL    GFR Estimate >90 >60 mL/min/1.73m2      Comment:      eGFR calculated using 2021 CKD-EPI equation.    Calcium 9.3 8.8 - 10.4 mg/dL      Comment:      Reference intervals for this test were updated on 7/16/2024 to reflect our healthy population more accurately. There may be differences in the flagging of prior results with similar values performed with this method. Those prior results can be interpreted in the context of the updated reference intervals.    Chloride 102 98 - 107 mmol/L    Glucose 82 70 - 99 mg/dL    Alkaline Phosphatase 62 40 - 150 U/L    AST 16 0 - 45 U/L    ALT 14 0 - 50 U/L    Protein Total 7.3 6.4 - 8.3 g/dL    Albumin 4.2 3.5 - 5.2 g/dL    Bilirubin Total 0.2 <=1.2 mg/dL    Patient Fasting > 8hrs? No    CBC with platelets   Result Value Ref Range    WBC Count 13.7 (H) 4.0 - 11.0 10e3/uL    RBC Count 4.63 3.80 - 5.20 10e6/uL    Hemoglobin 13.3 11.7 - 15.7 g/dL    Hematocrit 38.7 35.0 - 47.0 %    MCV 84 78 - 100 fL    MCH 28.7 26.5 - 33.0 pg    MCHC 34.4 31.5 - 36.5 g/dL    RDW 13.1 10.0 - 15.0 %    Platelet Count 275 150 - 450 10e3/uL   Lipid panel reflex to direct LDL Non-fasting   Result Value Ref Range    Cholesterol 175 <200 mg/dL    Triglycerides 160 (H) <150 mg/dL    Direct Measure HDL 47 (L) >=50 mg/dL    LDL Cholesterol Calculated 96 <100 mg/dL    Non HDL Cholesterol 128 <130 mg/dL    Patient Fasting > 8hrs? No     Narrative    Cholesterol  Desirable: < 200 mg/dL  Borderline High: 200 - 239 mg/dL  High: >= 240 mg/dL    Triglycerides  Normal: < 150 mg/dL  Borderline High: 150 - 199 mg/dL  High: 200-499 mg/dL  Very High: >= 500 mg/dL    Direct Measure HDL  Female: >= 50 mg/dL   Male: >= 40 mg/dL    LDL Cholesterol  Desirable: < 100 mg/dL  Above Desirable: 100 - 129 mg/dL   Borderline High: 130 - 159 mg/dL    High:  160 - 189 mg/dL   Very High: >= 190 mg/dL    Non HDL Cholesterol  Desirable: < 130 mg/dL  Above Desirable: 130 - 159 mg/dL  Borderline High: 160 - 189 mg/dL  High: 190 - 219 mg/dL  Very High: >= 220 mg/dL       If you have any questions or concerns, please call the clinic at the number listed above.       Sincerely,      SARAH Byrd CNP

## 2024-09-17 NOTE — PROGRESS NOTES
"Pre[facility]ive Care Visit  [facility]  Sep 17, 2024      Assessment & Plan     Routine history and physical examination of adult  Screening labs today per maintenance, age, risk assessment, and to promote patient wellbeing.  Blood pressure in clinic 112/62.  10/2022 Pap exam with HPV testing negative for malignancy, due 10/2025.  - REVIEW OF HEALTH MAINTENANCE PROTOCOL ORDERS  - PRIMARY CARE FOLLOW-UP SCHEDULING; Future  - CBC with platelets    Screening for diabetes mellitus  6/2023 glucose 94.  Managed with lifestyle modifications.  Will obtain glucose level.  - Comprehensive metabolic panel    Screening for hyperlipidemia  No prior lipid panel.  Managed with lifestyle modifications.  Will obtain nonfasting lipid panel.  - Lipid panel reflex to direct LDL Non-fasting    Elevated alkaline phosphatase level  6/2023 alkaline phosphatase 126, other slight abnormalities on CMP.  Will recheck CMP.   - Comprehensive metabolic panel    Vaginal discharge  Will obtain wet prep.  Will rule out gonorrhea and chlamydia.  - Wet prep - Clinic Collect  - Chlamydia trachomatis/Neisseria gonorrhoeae by PCR    BMI  Estimated body mass index is 28.62 kg/m  as calculated from the following:    Height as of this encounter: 1.545 m (5' 0.83\").    Weight as of this encounter: 68.3 kg (150 lb 9.6 oz).     Counseling  Appropriate preventive services were addressed with this patient via screening, questionnaire, or discussion as appropriate for fall prevention, nutrition, physical activity, Tobacco-use cessation, social engagement, weight loss and cognition.  Checklist reviewing preventive services available has been given to the patient.  Reviewed patient's diet, addressing concerns and/or questions.   She is at risk for lack of exercise and has been provided with information to increase physical activity for the benefit of her well-being.   The patient was instructed to see the " dentist every 6 months.     Subjective   Nouxiong is a 28 year old, presenting for the following:  Physical        9/17/2024    12:34 PM   Additional Questions   Roomed by Danielle JOYCE CMA   Accompanied by Spouse and son      Health Care Directive  Patient does not have a Health Care Directive or Living Will    HPI  Patient is a 28 year old female presenting with  and son for annual physical exam.  Medical history includes anemia of pregnancy (5/2023).  No surgical history.  No current medications.      LMP: 7/20/2024  Hx of abnormal pap smear: no   Last pap smear: 10/2022 negative for malignancy    Sexually active: yes   Contraception: none  Concerns for STDs: reports white/green vaginal discharge daily for the past year.  Denies vaginal irritation, itchiness, or rash.  No urinary symptoms.            9/17/2024   General Health   How would you rate your overall physical health? Good   Feel stress (tense, anxious, or unable to sleep) Not at all          9/17/2024   Nutrition   Three or more servings of calcium each day? Yes   Diet: Regular (no restrictions)   How many servings of fruit and vegetables per day? (!) 0-1   How many sweetened beverages each day? 0-1          9/17/2024   Exercise   Days per week of moderate/strenous exercise 2 days   Average minutes spent exercising at this level 30 min      (!) EXERCISE CONCERN      9/17/2024   Social Factors   Frequency of gathering with friends or relatives Once a week   Worry food won't last until get money to buy more No   Food not last or not have enough money for food? Yes   Do you have housing? (Housing is defined as stable permanent housing and does not include staying ouside in a car, in a tent, in an abandoned building, in an overnight shelter, or couch-surfing.) Yes   Are you worried about losing your housing? Yes   Lack of transportation? Yes   Unable to get utilities (heat,electricity)? No   Want help with housing or utility concern? No      (!) FOOD  SECURITY CONCERN PRESENT (!) TRANSPORTATION CONCERN PRESENT(!) HOUSING CONCERN PRESENT      9/17/2024   Dental   Dentist two times every year? (!) NO          9/17/2024   TB Screening   Were you born outside of the [place]? Yes     Today's PHQ-2 Score:       9/17/2024    12:45 PM   PHQ-2 ( 1999 [employer])   Q1: Little interest or pleasure in doing things 0   Q2: Feeling down, depressed or hopeless 0   PHQ-2 Score 0   Q1: Little interest or pleasure in doing things Not at all   Q2: Feeling down, depressed or hopeless Not at all   PHQ-2 Score 0         9/17/2024   Substance Use   Alcohol more than 3/day or more than 7/wk No   Do you use any other substances recreationally? No      Social History     Tobacco Use    Smoking status: Never     Passive exposure: Never    Smokeless tobacco: Never   Vaping Use    Vaping status: Never Used   Substance Use Topics    Alcohol use: Never    Drug use: Never          Mammogram Screening - Patient under 40 years of age: Routine Mammogram Screening not recommended.         9/17/2024   STI Screening   New sexual partner(s) since last STI/HIV test? No      History of abnormal Pap smear: No - age 21-29 PAP every 3 years recommended        10/26/2022     4:20 PM   PAP / HPV   PAP Negative for Intraepithelial Lesion or Malignancy (NILM)          9/17/2024   Contraception/Family Planning   Questions about contraception or family planning No         Reviewed and updated as needed this visit by Provider    Past Medical History:   Diagnosis Date    Anemia of pregnancy 5/22/2023    Chorioamnionitis in third trimester 5/20/2023    Hemorrhage 6/5/2023    Laceration, obstetrical, fourth degree 5/22/2023    Vacuum-assisted vaginal delivery 5/20/2023     Past Surgical History:   Procedure Laterality Date    NO HISTORY OF SURGERY       BP Readings from Last 3 Encounters:   09/17/24 112/62   07/03/23 110/60   06/05/23 100/62    Wt Readings from Last 3 Encounters:   09/17/24 68.3 kg (150 lb 9.6 oz)   07/03/23  "71.7 kg (158 lb)   06/05/23 68.5 kg (151 lb)         No current outpatient medications on file.     Review of Systems  Review of systems negative otherwise known HPI.     Objective    Exam  /62 (BP Location: Left arm, Patient Position: Sitting, Cuff Size: Adult Regular)   Pulse 95   Temp 97.8  F (36.6  C) (Temporal)   Resp 18   Ht 1.545 m (5' 0.83\")   Wt 68.3 kg (150 lb 9.6 oz)   LMP 07/20/2024 (Exact Date)   SpO2 98%   Breastfeeding No   BMI 28.62 kg/m     Estimated body mass index is 28.62 kg/m  as calculated from the following:    Height as of this encounter: 1.545 m (5' 0.83\").    Weight as of this encounter: 68.3 kg (150 lb 9.6 oz).    Physical Exam  General: Alert, oriented, no acute distress.    Head: Normocephalic and atraumatic.   Eyes: Conjunctiva and sclera clear. MARÍA ELENA.   Ears: External ear nontender. TMs intact and clear. Grossly normal hearing.   Oropharynx: Dentition intact. Oral and posterior pharynx pink and moist.     Neck: Supple, no masses or nodes. No adenopathy.    Respiratory: Lungs clear, unlabored. No rales, rhonchi, or wheezes.    Cardiovascular: Regular rate and rhythm, S1 and S2. No murmurs. No peripheral edema.     Gastrointestinal: Normoactive bowel sounds. Soft, nontender, no organomegaly.     Musculoskeletal: No joint tenderness, deformity, or swelling.     Skin: No suspicious lesions, rashes, or abnormalities.    Neurologic: No gross motor or sensory deficit. Mentation intact and speech normal.     Psychiatric: Appropriate affect.     Signed Electronically by: SARAH Byrd CNP    "

## 2024-09-18 LAB
ALBUMIN SERPL BCG-MCNC: 4.2 G/DL (ref 3.5–5.2)
ALP SERPL-CCNC: 62 U/L (ref 40–150)
ALT SERPL W P-5'-P-CCNC: 14 U/L (ref 0–50)
ANION GAP SERPL CALCULATED.3IONS-SCNC: 12 MMOL/L (ref 7–15)
AST SERPL W P-5'-P-CCNC: 16 U/L (ref 0–45)
BILIRUB SERPL-MCNC: 0.2 MG/DL
BUN SERPL-MCNC: 8.2 MG/DL (ref 6–20)
C TRACH DNA SPEC QL PROBE+SIG AMP: NEGATIVE
CALCIUM SERPL-MCNC: 9.3 MG/DL (ref 8.8–10.4)
CHLORIDE SERPL-SCNC: 102 MMOL/L (ref 98–107)
CHOLEST SERPL-MCNC: 175 MG/DL
CREAT SERPL-MCNC: 0.47 MG/DL (ref 0.51–0.95)
EGFRCR SERPLBLD CKD-EPI 2021: >90 ML/MIN/1.73M2
FASTING STATUS PATIENT QL REPORTED: NO
FASTING STATUS PATIENT QL REPORTED: NO
GLUCOSE SERPL-MCNC: 82 MG/DL (ref 70–99)
HCO3 SERPL-SCNC: 22 MMOL/L (ref 22–29)
HDLC SERPL-MCNC: 47 MG/DL
LDLC SERPL CALC-MCNC: 96 MG/DL
N GONORRHOEA DNA SPEC QL NAA+PROBE: NEGATIVE
NONHDLC SERPL-MCNC: 128 MG/DL
POTASSIUM SERPL-SCNC: 3.7 MMOL/L (ref 3.4–5.3)
PROT SERPL-MCNC: 7.3 G/DL (ref 6.4–8.3)
SODIUM SERPL-SCNC: 136 MMOL/L (ref 135–145)
TRIGL SERPL-MCNC: 160 MG/DL

## 2024-09-19 ENCOUNTER — TELEPHONE (OUTPATIENT)
Dept: FAMILY MEDICINE | Facility: CLINIC | Age: 29
End: 2024-09-19
Payer: COMMERCIAL

## 2024-09-19 ENCOUNTER — APPOINTMENT (OUTPATIENT)
Dept: INTERPRETER SERVICES | Facility: CLINIC | Age: 29
End: 2024-09-19
Payer: COMMERCIAL

## 2024-09-20 NOTE — TELEPHONE ENCOUNTER
Tried to contact patient with [employer] . No answer and voicemail was full. Patient did read [employer] message, but has not scheduled lab only appointment yet.    Danielle JOYCE CMA on September 20, 2024 at 2:55 PM

## 2024-10-03 ENCOUNTER — OFFICE VISIT (OUTPATIENT)
Dept: FAMILY MEDICINE | Facility: CLINIC | Age: 29
End: 2024-10-03
Payer: COMMERCIAL

## 2024-10-03 VITALS
HEIGHT: 61 IN | BODY MASS INDEX: 28.81 KG/M2 | OXYGEN SATURATION: 99 % | SYSTOLIC BLOOD PRESSURE: 104 MMHG | RESPIRATION RATE: 16 BRPM | WEIGHT: 152.6 LBS | TEMPERATURE: 97.8 F | HEART RATE: 80 BPM | DIASTOLIC BLOOD PRESSURE: 68 MMHG

## 2024-10-03 DIAGNOSIS — Z00.00 HEALTH CARE MAINTENANCE: Primary | ICD-10-CM

## 2024-10-03 DIAGNOSIS — N92.6 MISSED PERIOD: ICD-10-CM

## 2024-10-03 DIAGNOSIS — D72.829 LEUKOCYTOSIS, UNSPECIFIED TYPE: ICD-10-CM

## 2024-10-03 LAB
ERYTHROCYTE [DISTWIDTH] IN BLOOD BY AUTOMATED COUNT: 13.5 % (ref 10–15)
HCG UR QL: POSITIVE
HCT VFR BLD AUTO: 39 % (ref 35–47)
HGB BLD-MCNC: 13.4 G/DL (ref 11.7–15.7)
HOLD SPECIMEN: NORMAL
MCH RBC QN AUTO: 28.8 PG (ref 26.5–33)
MCHC RBC AUTO-ENTMCNC: 34.4 G/DL (ref 31.5–36.5)
MCV RBC AUTO: 84 FL (ref 78–100)
PLATELET # BLD AUTO: 257 10E3/UL (ref 150–450)
RBC # BLD AUTO: 4.66 10E6/UL (ref 3.8–5.2)
WBC # BLD AUTO: 9.5 10E3/UL (ref 4–11)

## 2024-10-03 PROCEDURE — 81025 URINE PREGNANCY TEST: CPT

## 2024-10-03 PROCEDURE — 85027 COMPLETE CBC AUTOMATED: CPT

## 2024-10-03 PROCEDURE — 99213 OFFICE O/P EST LOW 20 MIN: CPT

## 2024-10-03 PROCEDURE — 36415 COLL VENOUS BLD VENIPUNCTURE: CPT

## 2024-10-03 ASSESSMENT — PAIN SCALES - GENERAL: PAINLEVEL: NO PAIN (0)

## 2024-10-03 NOTE — PROGRESS NOTES
"  Assessment & Plan     Health care maintenance  Leukocytosis, unspecified type  Reviewed annual physical labs with patient and  in clinic.  At previous visit WBC was elevated at 13.7.  Repeat CBC today WNL, discussed results in clinic with patient with phone .    - CBC with platelets    Missed period  LMP 2024. Trying to conceive.  UPT positive, discussed results in clinic with patient with phone .  , estimate 10 weeks gestation.  Advised patient to see Dr. Gonzalez or Dr. Kendall for OB care within 2 weeks.  Advised patient to start taking daily prenatal pill. OB US ordered.   - US OB < 14 Weeks Single   - HCG qualitative urine    BMI  Estimated body mass index is 28.99 kg/m  as calculated from the following:    Height as of this encounter: 1.545 m (5' 0.83\").    Weight as of this encounter: 69.2 kg (152 lb 9.6 oz).     Subjective   Nouxiong is a 28 year old, presenting for the following health issues:  Follow Up (Follow up on lab results. )      10/3/2024    12:27 PM   Additional Questions   Roomed by Danielle JOYCE CMA   Accompanied by Spouse and son     History of Present Illness       Reason for visit:  Follow up labtest result    She eats 2-3 servings of fruits and vegetables daily.She consumes 1 sweetened beverage(s) daily.She exercises with enough effort to increase her heart rate 10 to 19 minutes per day.  She exercises with enough effort to increase her heart rate 4 days per week.   She is taking medications regularly.     Patient is a 28 year old female presenting with  and son for follow up on labs. Medical history includes anemia of pregnancy (2023).  Phone  used during visit.    With language barrier, patient wanted to review previous lab results at clinic with .    CBC with hemoglobin within normal range. WBC (white cell count) elevated at 13.7. Will recheck CBC today.   Comprehensive metabolic panel within normal range. Slight decrease in " "creatinine (kidney function), no concerns.  Lipid panel mostly within range. Slight elevation of triglycerides which could be related to not fasting prior to lab. No concerns.   Wet prep negative for trichomonas, yeast, bacterial vaginosis.  Chlamydia and gonorrhea testing negative.    LMP: 7/20/2024.  Trying to conceive, no current contraception.  Patient would like pregnancy test.      Review of Systems  Review of systems negative otherwise known HPI.    Past Medical History:   Diagnosis Date    Anemia of pregnancy 5/22/2023    Chorioamnionitis in third trimester 5/20/2023    Hemorrhage 6/5/2023    Laceration, obstetrical, fourth degree 5/22/2023    Vacuum-assisted vaginal delivery 5/20/2023       Past Surgical History:   Procedure Laterality Date    NO HISTORY OF SURGERY         Family History   Problem Relation Age of Onset    No Known Problems Mother     No Known Problems Father     No Known Problems Maternal Grandmother     No Known Problems Maternal Grandfather     No Known Problems Paternal Grandmother         old age    No Known Problems Paternal Grandfather         old age    No Known Problems Brother     No Known Problems Brother     No Known Problems Brother     No Known Problems Brother     No Known Problems Sister     No Known Problems Sister     No Known Problems Sister        Social History     Tobacco Use    Smoking status: Never     Passive exposure: Never    Smokeless tobacco: Never   Substance Use Topics    Alcohol use: Never     No current outpatient medications on file.     No current facility-administered medications for this visit.       Objective    /68 (BP Location: Left arm, Patient Position: Sitting, Cuff Size: Adult Regular)   Pulse 80   Temp 97.8  F (36.6  C) (Temporal)   Resp 16   Ht 1.545 m (5' 0.83\")   Wt 69.2 kg (152 lb 9.6 oz)   LMP 07/20/2024 (Exact Date)   SpO2 99%   BMI 28.99 kg/m    Body mass index is 28.99 kg/m .  Physical Exam   General: Alert, oriented, no " acute distress.    Respiratory: Easy work of breathing.   Cardiovascular: Appears warm and well-perfused.    Skin: No abnormalities noted on visualized skin.   Neurologic: Mentation intact and speech normal.     Psychiatric: Appropriate affect.     Signed Electronically by: SARAH Byrd CNP

## 2024-10-08 ENCOUNTER — OFFICE VISIT (OUTPATIENT)
Dept: FAMILY MEDICINE | Facility: CLINIC | Age: 29
End: 2024-10-08
Payer: COMMERCIAL

## 2024-10-13 LAB
ABO/RH(D): NORMAL
ANTIBODY SCREEN: NEGATIVE
SPECIMEN EXPIRATION DATE: NORMAL

## 2024-10-14 ENCOUNTER — PRENATAL OFFICE VISIT (OUTPATIENT)
Dept: FAMILY MEDICINE | Facility: CLINIC | Age: 29
End: 2024-10-14
Payer: COMMERCIAL

## 2024-10-14 VITALS
RESPIRATION RATE: 16 BRPM | HEART RATE: 84 BPM | OXYGEN SATURATION: 98 % | BODY MASS INDEX: 28.81 KG/M2 | HEIGHT: 61 IN | TEMPERATURE: 97.6 F | WEIGHT: 152.6 LBS | DIASTOLIC BLOOD PRESSURE: 62 MMHG | SYSTOLIC BLOOD PRESSURE: 98 MMHG

## 2024-10-14 DIAGNOSIS — O36.8390 UNABLE TO HEAR FETAL HEART TONES AS REASON FOR ULTRASOUND SCAN: ICD-10-CM

## 2024-10-14 DIAGNOSIS — Z34.81 ENCOUNTER FOR SUPERVISION OF OTHER NORMAL PREGNANCY IN FIRST TRIMESTER: Primary | ICD-10-CM

## 2024-10-14 LAB
HBV SURFACE AG SERPL QL IA: NONREACTIVE
HIV 1+2 AB+HIV1 P24 AG SERPL QL IA: NONREACTIVE
RUBV IGG SERPL QL IA: 19.4 INDEX
RUBV IGG SERPL QL IA: POSITIVE
T PALLIDUM AB SER QL: NONREACTIVE

## 2024-10-14 PROCEDURE — 87591 N.GONORRHOEAE DNA AMP PROB: CPT | Performed by: FAMILY MEDICINE

## 2024-10-14 PROCEDURE — 86850 RBC ANTIBODY SCREEN: CPT | Performed by: FAMILY MEDICINE

## 2024-10-14 PROCEDURE — 87389 HIV-1 AG W/HIV-1&-2 AB AG IA: CPT | Performed by: FAMILY MEDICINE

## 2024-10-14 PROCEDURE — 86901 BLOOD TYPING SEROLOGIC RH(D): CPT | Performed by: FAMILY MEDICINE

## 2024-10-14 PROCEDURE — 87491 CHLMYD TRACH DNA AMP PROBE: CPT | Performed by: FAMILY MEDICINE

## 2024-10-14 PROCEDURE — 99213 OFFICE O/P EST LOW 20 MIN: CPT | Performed by: FAMILY MEDICINE

## 2024-10-14 PROCEDURE — 36415 COLL VENOUS BLD VENIPUNCTURE: CPT | Performed by: FAMILY MEDICINE

## 2024-10-14 PROCEDURE — 87340 HEPATITIS B SURFACE AG IA: CPT | Performed by: FAMILY MEDICINE

## 2024-10-14 PROCEDURE — T1013 SIGN LANG/ORAL INTERPRETER: HCPCS | Mod: U4 | Performed by: INTERPRETER

## 2024-10-14 PROCEDURE — 86762 RUBELLA ANTIBODY: CPT | Performed by: FAMILY MEDICINE

## 2024-10-14 PROCEDURE — 86900 BLOOD TYPING SEROLOGIC ABO: CPT | Performed by: FAMILY MEDICINE

## 2024-10-14 PROCEDURE — 86780 TREPONEMA PALLIDUM: CPT | Performed by: FAMILY MEDICINE

## 2024-10-14 RX ORDER — VITAMIN A, VITAMIN C, VITAMIN D-3, VITAMIN E, VITAMIN B-1, VITAMIN B-2, NIACIN, VITAMIN B-6, CALCIUM, IRON, ZINC, COPPER 4000; 120; 400; 22; 1.84; 3; 20; 10; 1; 12; 200; 27; 25; 2 [IU]/1; MG/1; [IU]/1; MG/1; MG/1; MG/1; MG/1; MG/1; MG/1; UG/1; MG/1; MG/1; MG/1; MG/1
1 TABLET ORAL DAILY
Qty: 90 TABLET | Refills: 3 | Status: SHIPPED | OUTPATIENT
Start: 2024-10-14

## 2024-10-14 NOTE — PROGRESS NOTES
"  SUBJECTIVE:     HPI:    This is a 28 year old female patient,  who presents for her first obstetrical visit.    CHRISTIAN: 2025, by Last Menstrual Period.  She is 12w2d weeks.  Her cycles are regular.  Her last menstrual period was normal.   Since her LMP, she has had no complaints).   She denies nausea, emesis, abdominal pain, vaginal discharge, and vaginal bleeding.    Additional History: Patient notes that she did have nausea with her first pregnancy.  She has not really had any pregnancy symptoms with this pregnancy.  She denies leakage of fluid or bleeding.  She has not yet scheduled an early ultrasound.  Interestingly, patient notes 2 episodes over the past month where something seems to be protruding from her vagina.  This resolves spontaneously.    Have you travelled during the pregnancy?No  Have your sexual partner(s) travelled during the pregnancy?No    HISTORY:   Planned Pregnancy: Yes  Marital Status:   Occupation: Not discussed  Living in Household: Spouse and Children    Past History:  Her past medical history   Past Medical History:   Diagnosis Date    Anemia of pregnancy 2023    Chorioamnionitis in third trimester 2023    Hemorrhage 2023    Laceration, obstetrical, fourth degree 2023    Vacuum-assisted vaginal delivery 2023   .      She has a history of   vaginal delivery assisted by vacuum, postpartum hemorrhage, fourth degree perineal tear.    Since her last LMP she denies use of alcohol, tobacco and street drugs.    Past medical, surgical, social and family history were reviewed and updated in [employer].      No current outpatient medications on file.     No current facility-administered medications for this visit.       ROS:   12 point review of systems negative other than symptoms noted below or in the HPI.      OBJECTIVE:     EXAM:  BP 98/62   Pulse 84   Temp 97.6  F (36.4  C) (Temporal)   Resp 16   Ht 1.545 m (5' 0.83\")   Wt 69.2 kg (152 lb 9.6 oz)  "  LMP 07/20/2024 (Exact Date)   SpO2 98%   BMI 28.99 kg/m   Body mass index is 28.99 kg/m .    GENERAL: alert and no distress  EYES: Eyes grossly normal to inspection, PERRL and conjunctivae and sclerae normal  HENT: ear canals and TM's normal, nose and mouth without ulcers or lesions  NECK: no adenopathy, no asymmetry, masses, or scars  RESP: lungs clear to auscultation - no rales, rhonchi or wheezes  BREAST: normal without masses, tenderness or nipple discharge and no palpable axillary masses or adenopathy  CV: regular rate and rhythm, normal S1 S2, no S3 or S4, no murmur, click or rub, no peripheral edema  ABDOMEN: soft, nontender, no hepatosplenomegaly, no masses and bowel sounds normal   (female) w/bimanual: normal female external genitalia, normal urethral meatus, normal vaginal mucosa, and normal cervix/adnexa/uterus without masses or discharge; cervix appears to be in normal position, no obvious cystocele.  MS: no gross musculoskeletal defects noted, no edema  SKIN: no suspicious lesions or rashes  NEURO: Normal strength and tone, mentation intact and speech normal  PSYCH: mentation appears normal, affect normal/bright    ASSESSMENT/PLAN:     1. Encounter for supervision of other normal pregnancy in first trimester  Routine first OB labs obtained as below.  Patient is not due for a Pap smear.  She declines any vaccines today.  We discussed routine prenatal care and will schedule a follow-up visit in 4 weeks.  - Prenatal Vit-Fe Fumarate-FA (PRENATAL MULTIVITAMIN  PLUS IRON) 27-1 MG TABS; Take 1 tablet by mouth daily.  Dispense: 90 tablet; Refill: 3  - ABO/Rh type and screen; Future  - Hepatitis B surface antigen; Future  - HIV Antigen Antibody Combo; Future  - Rubella Antibody IgG; Future  - Treponema Abs w Reflex to RPR and Titer; Future  - Urine Culture Aerobic Bacterial; Future  - *UA reflex to Microscopic; Future  - US OB < 14 Weeks Single; Future  - Neisseria gonorrhoeae PCR  - Chlamydia trachomatis  PCR    2. Unable to hear fetal heart tones as reason for ultrasound scan  I was unable to hear fetal heart tones with the Doppler today, and unable to see a fetal pole with the bedside ultrasound.  Patient will be sent for formal early ultrasound and we will assist with arranging this given her language barrier.      28 year old , 12w2d weeks of pregnancy with CHRISTIAN of 2025, by Last Menstrual Period    Discussed as follows: See above and AVS    Counseling given:   - Follow up in 4-6 weeks for return OB visit.  - Recommended weight gain for pregnancy: 15-25 lbs.      PLAN/PATIENT INSTRUCTIONS:    See above and AVS     Drea Kendall MD

## 2024-10-14 NOTE — PATIENT INSTRUCTIONS
Weeks 10 to 14 of Your Pregnancy: Care Instructions  It's now possible to hear the fetus's heartbeat with a special ultrasound device. And the fetus's organs are developing.    Decide about tests to check for birth defects. Think about your age, your chance of passing on a family disease, your need to know about any problems, and what you might do after you have the test results.   It's okay to exercise. Try activities such as walking or swimming. Check with your doctor before starting a new program.     You may feel more tired than usual.  Taking naps during the day may help.     You may feel emotional.  It might help to talk to someone.     You may have headaches.  Try lying down and putting a cool cloth over your forehead.     You can use acetaminophen (Tylenol) for pain relief.  Don't take any anti-inflammatory medicines (such as Advil, Motrin, Aleve), unless your doctor says it's okay.     You may feel a fullness or aching in your lower belly.  This can feel like the kind of cramps you might get before a period. A back rub may help.     You may need to urinate more.  Your growing uterus and changing hormones can affect your bladder.     You may feel sick to your stomach (morning sickness).  Try avoiding food and smells that make you feel sick.     Your breasts may feel different.  They may feel tender or get bigger. Your nipples may get darker. Try a bra that gives you good support.     Avoid alcohol, tobacco, and drugs (including marijuana).  If you need help quitting, talk to your doctor.     Take a daily prenatal vitamin.  Choose one with folic acid.   Follow-up care is a key part of your treatment and safety. Be sure to make and go to all appointments, and call your doctor if you are having problems. It's also a good idea to know your test results and keep a list of the medicines you take.  Where can you learn more?  Go to https://www.healthwise.net/patiented  Enter E090 in the search box to learn more  "about \"Weeks 10 to 14 of Your Pregnancy: Care Instructions.\"  Current as of: July 10, 2023  Content Version: 14.2 2024 [employer].   Care instructions adapted under license by your healthcare professional. If you have questions about a medical condition or this instruction, always ask your healthcare professional. Healthwise, Incorporated disclaims any warranty or liability for your use of this information.    Nutrition During Pregnancy: Care Instructions  Overview     Healthy eating when you are pregnant is important for you and your baby. It can help you feel well and have a successful pregnancy and delivery. During pregnancy your nutrition needs increase. Even if you have excellent eating habits, your doctor may recommend a multivitamin to make sure you get enough iron and folic acid.  You may wonder how much weight you should gain. In general, if you were at a healthy weight before you became pregnant, then you should gain between 25 and 35 pounds. If you were overweight before pregnancy, then you'll likely be advised to gain 15 to 25 pounds. If you were underweight before pregnancy, then you'll probably be advised to gain 28 to 40 pounds. Your doctor will work with you to set a weight goal that is right for you. Gaining a healthy amount of weight helps you have a healthy baby.  Follow-up care is a key part of your treatment and safety. Be sure to make and go to all appointments, and call your doctor if you are having problems. It's also a good idea to know your test results and keep a list of the medicines you take.  How can you care for yourself at home?  Eat plenty of fruits and vegetables. Include a variety of orange, yellow, and leafy dark-green vegetables every day.  Choose whole-grain bread, cereal, and pasta. Good choices include whole wheat bread, whole wheat pasta, brown rice, and oatmeal.  Get 4 or more servings of milk and milk products each day. Good choices include nonfat or low-fat " milk, yogurt, and cheese. If you cannot eat milk products, you can get calcium from calcium-fortified products such as orange juice, soy milk, and tofu. Other non-milk sources of calcium include leafy green vegetables, such as broccoli, kale, mustard greens, turnip greens, bok arnold, and brussels sprouts.  If you eat meat, pick lower-fat types. Good choices include lean cuts of meat and chicken or turkey without the skin.  Avoid fish that are high in mercury. These include shark, swordfish, marcelina mackerel, marlin, orange roughy, and bigeye tuna, as well as tilefish from the [facility].  It's okay to eat up to 8 to 12 ounces a week of fish that are low in mercury or up to 4 ounces a week of fish that have medium levels of mercury. Some fish that are low in mercury are salmon, shrimp, canned light tuna, cod, and tilapia. Some fish that have medium levels of mercury are halibut and white albacore tuna.  For more advice about eating fish, you can visit the U.S. Food and Drug Administration (FDA) or U.S. Environmental Protection Agency (EPA) website.  Heat lunch meats (such as turkey, ham, or bologna) to 165 F before you eat them. This reduces your risk of getting sick from a kind of bacteria that can be found in lunch meats.  Do not eat unpasteurized soft cheeses, such as brie, feta, fresh mozzarella, and blue cheese. They have a bacteria that could harm your baby.  Limit caffeine to about 200 to 300 mg per day. On average, a cup of brewed coffee has around 80 to 100 mg of caffeine.  Do not drink any alcohol. No amount of alcohol has been found to be safe during pregnancy.  Do not diet or try to lose weight. For example, do not follow a low-carbohydrate diet. If you are overweight at the start of your pregnancy, your doctor will work with you to manage your weight gain.  Tell your doctor about all vitamins and supplements you take.  When should you call for help?  Watch closely for changes in your health, and be sure  "to contact your doctor if you have any problems.  Where can you learn more?  Go to https://www.[employer].net/patiented  Enter Y785 in the search box to learn more about \"Nutrition During Pregnancy: Care Instructions.\"  Current as of: September 20, 2023  Content Version: 14.2 2024 [employer].   Care instructions adapted under license by your healthcare professional. If you have questions about a medical condition or this instruction, always ask your healthcare professional. Healthwise, Incorporated disclaims any warranty or liability for your use of this information.    Exercise During Pregnancy: Care Instructions  Overview     Exercise is good for you during a healthy pregnancy. It can relieve back pain, swelling, and other discomforts. It also prepares your muscles for childbirth. And exercise can improve your energy level and help you sleep better.  If your doctor advises it, get more exercise. For example, walking is a good choice. Bit by bit, increase the amount you walk every day. Try for at least 30 minutes on most days of the week. You could also try a prenatal exercise class. But if you do not already exercise, be sure to talk with your doctor before you start a new exercise program. Doctors do not recommend contact sports during pregnancy.  Follow-up care is a key part of your treatment and safety. Be sure to make and go to all appointments, and call your doctor if you are having problems. It's also a good idea to know your test results and keep a list of the medicines you take.  How can you care for yourself at home?  Talk with your doctor about the right kind of exercise for each stage of pregnancy.  Listen to your body to know if your exercise is at a safe level.  Do not become overheated while you exercise. High body temperature can be harmful. Avoid activities that can make your body too hot.  If you feel tired, take it easy. You might walk instead of run.  If you are used to strenuous " "exercise, ask your doctor how to know when it's time to slow down.  If you exercised before getting pregnant, you should be able to keep up your routine early in your pregnancy. Later in your pregnancy, you may want to switch to more gentle activities.  Drink plenty of fluids before, during, and after exercise.  Avoid contact sports, such as soccer and basketball. Also avoid risky activities. These include scuba diving, horseback riding, and exercising at a high altitude (above 6,000 feet). If you live in a place with a high altitude, talk to your doctor about how you can exercise safely.  Do not get overtired while you exercise. You should be able to talk while you work out.  After your fourth month of pregnancy, avoid exercises that require you to lie flat on your back on a hard surface. These include sit-ups and some yoga poses.  Get plenty of rest. You may be very tired while you are pregnant.  Where can you learn more?  Go to https://www.[employer].net/patiented  Enter S801 in the search box to learn more about \"Exercise During Pregnancy: Care Instructions.\"  Current as of: July 10, 2023  Content Version: 14.2 2024 [facility].   Care instructions adapted under license by your healthcare professional. If you have questions about a medical condition or this instruction, always ask your healthcare professional. Healthwise, Incorporated disclaims any warranty or liability for your use of this information.    You have been provided the CDC Warning Signs in Pregnancy document.    Additional copies can be found here: www.[employer].com/243238.pdf  "

## 2024-10-15 ENCOUNTER — TELEPHONE (OUTPATIENT)
Dept: FAMILY MEDICINE | Facility: CLINIC | Age: 29
End: 2024-10-15
Payer: COMMERCIAL

## 2024-10-15 ENCOUNTER — HOSPITAL ENCOUNTER (OUTPATIENT)
Dept: ULTRASOUND IMAGING | Facility: HOSPITAL | Age: 29
Discharge: HOME | End: 2024-10-15
Attending: FAMILY MEDICINE | Admitting: FAMILY MEDICINE
Payer: COMMERCIAL

## 2024-10-15 DIAGNOSIS — O02.0 EMPTY GESTATIONAL SAC WITH ONGOING PREGNANCY: Primary | ICD-10-CM

## 2024-10-15 DIAGNOSIS — Z34.81 ENCOUNTER FOR SUPERVISION OF OTHER NORMAL PREGNANCY IN FIRST TRIMESTER: ICD-10-CM

## 2024-10-15 LAB
C TRACH DNA SPEC QL NAA+PROBE: NEGATIVE
N GONORRHOEA DNA SPEC QL NAA+PROBE: NEGATIVE

## 2024-10-15 PROCEDURE — T1013 SIGN LANG/ORAL INTERPRETER: HCPCS | Mod: U4 | Performed by: INTERPRETER

## 2024-10-15 PROCEDURE — 76801 OB US < 14 WKS SINGLE FETUS: CPT

## 2024-10-15 NOTE — TELEPHONE ENCOUNTER
Writer received a call from [facility] regarding patient ultrasound results.     Radiologist Dr. Miles Anguiano states ultrasound ordered due to no fetal heartbeat in clinic.    Ultrasound findings are as follows:   Consistent with a pregnancy failure. Large gestational sac seen with nothing in it. Moderate subchorionic hemorrhage measuring 6.4 cm.     Huddled with Dr. Gonzalez who stated it is okay to wait for Dr. Kendall input and no emergent intervention needed at this time.    Routing to provider to review and advise.    MARINO Magaña, RN  [facility]

## 2024-10-15 NOTE — TELEPHONE ENCOUNTER
Attempted call with  after my clinic today at 6:33 PM on 10/15/2024.  Phone went to voicemail and voicemail box was full, unable to leave message.  Will try calling again tomorrow from clinic.

## 2024-10-16 ENCOUNTER — APPOINTMENT (OUTPATIENT)
Dept: INTERPRETER SERVICES | Facility: CLINIC | Age: 29
End: 2024-10-16
Payer: COMMERCIAL

## 2024-10-16 NOTE — TELEPHONE ENCOUNTER
Attempted to call patient with . No answer and voicemail box was full, so unable to leave a message.    Will attempt to call again tomorrow.    Mahnaz Baron RN  [facility]

## 2024-10-16 NOTE — TELEPHONE ENCOUNTER
Attempted call to patient again today over lunch, at 12:44 PM with .  Phone continues to go to voicemail and message states that voicemail box is full.    Please continue to attempt to reach patient in regard to her recent ultrasound result.  Her uterus showed a gestational sac, but no fetal pole.  This is diagnostic of a failed pregnancy.  I would like to refer patient to OB/GYN to discuss options.  Please let me know if she has further questions and I can give her a call at a time and number of her choice, thanks.  Otherwise if she is agreeable to referral, I will place this and would give her contact information for [facility] in [place].

## 2024-10-17 NOTE — TELEPHONE ENCOUNTER
Writer called patient and discussed result notes per provider using . Patient states she is okay going to see OBGYN at [facility] in [place]. Writer provided patient with number to [facility] and informed her that if she calls and they request a referral to inform them that her doctor is sending one in. Patient agrees and states he  will call them today. Writer verified a second time that patient does not want a call from provider and patient states no she does not need a call from Dr. Kendall. Routing to Dr. Kendall to place referral.    ELVIS MagañaN, RN  [facility]

## 2024-10-21 ENCOUNTER — ANESTHESIA EVENT (OUTPATIENT)
Dept: SURGERY | Facility: HOSPITAL | Age: 29
End: 2024-10-21
Payer: COMMERCIAL

## 2024-10-21 ENCOUNTER — HOSPITAL ENCOUNTER (OUTPATIENT)
Facility: HOSPITAL | Age: 29
Discharge: HOME | End: 2024-10-22
Attending: STUDENT IN AN ORGANIZED HEALTH CARE EDUCATION/TRAINING PROGRAM | Admitting: OBSTETRICS & GYNECOLOGY
Payer: COMMERCIAL

## 2024-10-21 ENCOUNTER — HOSPITAL ENCOUNTER (INPATIENT)
Facility: HOSPITAL | Age: 29
Setting detail: SURGERY ADMIT
End: 2024-10-21
Attending: OBSTETRICS & GYNECOLOGY | Admitting: OBSTETRICS & GYNECOLOGY
Payer: COMMERCIAL

## 2024-10-21 ENCOUNTER — ANESTHESIA (OUTPATIENT)
Dept: SURGERY | Facility: HOSPITAL | Age: 29
End: 2024-10-21
Payer: COMMERCIAL

## 2024-10-21 DIAGNOSIS — N93.9 VAGINAL BLEEDING: ICD-10-CM

## 2024-10-21 DIAGNOSIS — R57.8 HEMORRHAGIC SHOCK (H): ICD-10-CM

## 2024-10-21 DIAGNOSIS — O03.2 INCOMPLETE MISCARRIAGE WITH BLOOD CLOT: Primary | ICD-10-CM

## 2024-10-21 LAB
ABO/RH(D): NORMAL
ANION GAP SERPL CALCULATED.3IONS-SCNC: 12 MMOL/L (ref 7–15)
ANTIBODY SCREEN: NEGATIVE
APTT PPP: 24 SECONDS (ref 22–38)
BASOPHILS # BLD AUTO: 0.1 10E3/UL (ref 0–0.2)
BASOPHILS NFR BLD AUTO: 1 %
BLD PROD TYP BPU: NORMAL
BLOOD COMPONENT TYPE: NORMAL
BUN SERPL-MCNC: 16.3 MG/DL (ref 6–20)
CALCIUM SERPL-MCNC: 7.4 MG/DL (ref 8.8–10.4)
CHLORIDE SERPL-SCNC: 111 MMOL/L (ref 98–107)
CODING SYSTEM: NORMAL
CREAT SERPL-MCNC: 0.64 MG/DL (ref 0.51–0.95)
CROSSMATCH: NORMAL
CROSSMATCH: NORMAL
EGFRCR SERPLBLD CKD-EPI 2021: >90 ML/MIN/1.73M2
EOSINOPHIL # BLD AUTO: 0.1 10E3/UL (ref 0–0.7)
EOSINOPHIL NFR BLD AUTO: 1 %
ERYTHROCYTE [DISTWIDTH] IN BLOOD BY AUTOMATED COUNT: 13.5 % (ref 10–15)
GLUCOSE SERPL-MCNC: 123 MG/DL (ref 70–99)
HCO3 SERPL-SCNC: 17 MMOL/L (ref 22–29)
HCT VFR BLD AUTO: 26.4 % (ref 35–47)
HGB BLD-MCNC: 8.9 G/DL (ref 11.7–15.7)
IMM GRANULOCYTES # BLD: 0.1 10E3/UL
IMM GRANULOCYTES NFR BLD: 1 %
INR PPP: 1.14 (ref 0.85–1.15)
ISSUE DATE AND TIME: NORMAL
LYMPHOCYTES # BLD AUTO: 2.8 10E3/UL (ref 0.8–5.3)
LYMPHOCYTES NFR BLD AUTO: 15 %
MCH RBC QN AUTO: 29.6 PG (ref 26.5–33)
MCHC RBC AUTO-ENTMCNC: 33.7 G/DL (ref 31.5–36.5)
MCV RBC AUTO: 88 FL (ref 78–100)
MONOCYTES # BLD AUTO: 0.8 10E3/UL (ref 0–1.3)
MONOCYTES NFR BLD AUTO: 4 %
NEUTROPHILS # BLD AUTO: 14.9 10E3/UL (ref 1.6–8.3)
NEUTROPHILS NFR BLD AUTO: 79 %
NRBC # BLD AUTO: 0 10E3/UL
NRBC BLD AUTO-RTO: 0 /100
PLATELET # BLD AUTO: 222 10E3/UL (ref 150–450)
POTASSIUM SERPL-SCNC: 3.6 MMOL/L (ref 3.4–5.3)
RBC # BLD AUTO: 3.01 10E6/UL (ref 3.8–5.2)
SODIUM SERPL-SCNC: 140 MMOL/L (ref 135–145)
SPECIMEN EXPIRATION DATE: NORMAL
UNIT ABO/RH: NORMAL
UNIT NUMBER: NORMAL
UNIT STATUS: NORMAL
UNIT TYPE ISBT: 5100
UNIT TYPE ISBT: 5100
UNIT TYPE ISBT: 9500
UNIT TYPE ISBT: 9500
WBC # BLD AUTO: 18.7 10E3/UL (ref 4–11)

## 2024-10-21 PROCEDURE — 250N000009 HC RX 250: Performed by: STUDENT IN AN ORGANIZED HEALTH CARE EDUCATION/TRAINING PROGRAM

## 2024-10-21 PROCEDURE — 258N000003 HC RX IP 258 OP 636: Performed by: ANESTHESIOLOGY

## 2024-10-21 PROCEDURE — 86923 COMPATIBILITY TEST ELECTRIC: CPT | Performed by: OBSTETRICS & GYNECOLOGY

## 2024-10-21 PROCEDURE — P9016 RBC LEUKOCYTES REDUCED: HCPCS

## 2024-10-21 PROCEDURE — 272N000001 HC OR GENERAL SUPPLY STERILE: Performed by: OBSTETRICS & GYNECOLOGY

## 2024-10-21 PROCEDURE — 99140 ANES COMP EMERGENCY COND: CPT | Performed by: STUDENT IN AN ORGANIZED HEALTH CARE EDUCATION/TRAINING PROGRAM

## 2024-10-21 PROCEDURE — 85730 THROMBOPLASTIN TIME PARTIAL: CPT | Performed by: STUDENT IN AN ORGANIZED HEALTH CARE EDUCATION/TRAINING PROGRAM

## 2024-10-21 PROCEDURE — 36415 COLL VENOUS BLD VENIPUNCTURE: CPT | Performed by: STUDENT IN AN ORGANIZED HEALTH CARE EDUCATION/TRAINING PROGRAM

## 2024-10-21 PROCEDURE — 250N000013 HC RX MED GY IP 250 OP 250 PS 637: Performed by: OBSTETRICS & GYNECOLOGY

## 2024-10-21 PROCEDURE — 250N000009 HC RX 250: Performed by: ANESTHESIOLOGY

## 2024-10-21 PROCEDURE — 710N000009 HC RECOVERY PHASE 1, LEVEL 1, PER MIN: Performed by: OBSTETRICS & GYNECOLOGY

## 2024-10-21 PROCEDURE — P9016 RBC LEUKOCYTES REDUCED: HCPCS | Performed by: STUDENT IN AN ORGANIZED HEALTH CARE EDUCATION/TRAINING PROGRAM

## 2024-10-21 PROCEDURE — 999N000141 HC STATISTIC PRE-PROCEDURE NURSING ASSESSMENT: Performed by: OBSTETRICS & GYNECOLOGY

## 2024-10-21 PROCEDURE — 59812 TREATMENT OF MISCARRIAGE: CPT | Performed by: ANESTHESIOLOGY

## 2024-10-21 PROCEDURE — 250N000009 HC RX 250: Performed by: OBSTETRICS & GYNECOLOGY

## 2024-10-21 PROCEDURE — 59812 TREATMENT OF MISCARRIAGE: CPT | Performed by: STUDENT IN AN ORGANIZED HEALTH CARE EDUCATION/TRAINING PROGRAM

## 2024-10-21 PROCEDURE — 80048 BASIC METABOLIC PNL TOTAL CA: CPT | Performed by: STUDENT IN AN ORGANIZED HEALTH CARE EDUCATION/TRAINING PROGRAM

## 2024-10-21 PROCEDURE — 86900 BLOOD TYPING SEROLOGIC ABO: CPT | Performed by: STUDENT IN AN ORGANIZED HEALTH CARE EDUCATION/TRAINING PROGRAM

## 2024-10-21 PROCEDURE — 85610 PROTHROMBIN TIME: CPT | Performed by: STUDENT IN AN ORGANIZED HEALTH CARE EDUCATION/TRAINING PROGRAM

## 2024-10-21 PROCEDURE — 370N000017 HC ANESTHESIA TECHNICAL FEE, PER MIN: Performed by: OBSTETRICS & GYNECOLOGY

## 2024-10-21 PROCEDURE — 258N000003 HC RX IP 258 OP 636: Performed by: STUDENT IN AN ORGANIZED HEALTH CARE EDUCATION/TRAINING PROGRAM

## 2024-10-21 PROCEDURE — 360N000075 HC SURGERY LEVEL 2, PER MIN: Performed by: OBSTETRICS & GYNECOLOGY

## 2024-10-21 PROCEDURE — 86923 COMPATIBILITY TEST ELECTRIC: CPT | Performed by: STUDENT IN AN ORGANIZED HEALTH CARE EDUCATION/TRAINING PROGRAM

## 2024-10-21 PROCEDURE — 250N000011 HC RX IP 250 OP 636: Performed by: ANESTHESIOLOGY

## 2024-10-21 PROCEDURE — 85025 COMPLETE CBC W/AUTO DIFF WBC: CPT | Performed by: STUDENT IN AN ORGANIZED HEALTH CARE EDUCATION/TRAINING PROGRAM

## 2024-10-21 PROCEDURE — 99285 EMERGENCY DEPT VISIT HI MDM: CPT | Mod: 25

## 2024-10-21 RX ORDER — OXYTOCIN 10 [USP'U]/ML
INJECTION, SOLUTION INTRAMUSCULAR; INTRAVENOUS
Status: DISCONTINUED
Start: 2024-10-21 | End: 2024-10-22 | Stop reason: HOSPADM

## 2024-10-21 RX ORDER — FENTANYL CITRATE 50 UG/ML
INJECTION, SOLUTION INTRAMUSCULAR; INTRAVENOUS PRN
Status: DISCONTINUED | OUTPATIENT
Start: 2024-10-21 | End: 2024-10-22

## 2024-10-21 RX ORDER — LIDOCAINE 40 MG/G
CREAM TOPICAL
Status: DISCONTINUED | OUTPATIENT
Start: 2024-10-21 | End: 2024-10-22 | Stop reason: HOSPADM

## 2024-10-21 RX ORDER — SODIUM CHLORIDE 9 MG/ML
INJECTION, SOLUTION INTRAVENOUS CONTINUOUS PRN
Status: DISCONTINUED | OUTPATIENT
Start: 2024-10-21 | End: 2024-10-22

## 2024-10-21 RX ORDER — DOXYCYCLINE 100 MG/1
200 CAPSULE ORAL ONCE
Status: COMPLETED | OUTPATIENT
Start: 2024-10-21 | End: 2024-10-21

## 2024-10-21 RX ORDER — ONDANSETRON 2 MG/ML
4 INJECTION INTRAMUSCULAR; INTRAVENOUS ONCE
Status: DISCONTINUED | OUTPATIENT
Start: 2024-10-21 | End: 2024-10-22 | Stop reason: HOSPADM

## 2024-10-21 RX ORDER — DEXAMETHASONE SODIUM PHOSPHATE 4 MG/ML
INJECTION, SOLUTION INTRA-ARTICULAR; INTRALESIONAL; INTRAMUSCULAR; INTRAVENOUS; SOFT TISSUE PRN
Status: DISCONTINUED | OUTPATIENT
Start: 2024-10-21 | End: 2024-10-22

## 2024-10-21 RX ORDER — ONDANSETRON 2 MG/ML
INJECTION INTRAMUSCULAR; INTRAVENOUS PRN
Status: DISCONTINUED | OUTPATIENT
Start: 2024-10-21 | End: 2024-10-22

## 2024-10-21 RX ORDER — ACETAMINOPHEN 325 MG/1
975 TABLET ORAL ONCE
Status: COMPLETED | OUTPATIENT
Start: 2024-10-21 | End: 2024-10-21

## 2024-10-21 RX ORDER — ONDANSETRON 4 MG/1
4 TABLET, ORALLY DISINTEGRATING ORAL ONCE
Status: DISCONTINUED | OUTPATIENT
Start: 2024-10-21 | End: 2024-10-22 | Stop reason: HOSPADM

## 2024-10-21 RX ORDER — SODIUM CHLORIDE, SODIUM LACTATE, POTASSIUM CHLORIDE, CALCIUM CHLORIDE 600; 310; 30; 20 MG/100ML; MG/100ML; MG/100ML; MG/100ML
INJECTION, SOLUTION INTRAVENOUS CONTINUOUS
Status: DISCONTINUED | OUTPATIENT
Start: 2024-10-21 | End: 2024-10-22 | Stop reason: HOSPADM

## 2024-10-21 RX ORDER — TRANEXAMIC ACID 100 MG/ML
INJECTION, SOLUTION INTRAVENOUS
Status: COMPLETED
Start: 2024-10-21 | End: 2024-10-21

## 2024-10-21 RX ORDER — TRANEXAMIC ACID 10 MG/ML
1 INJECTION, SOLUTION INTRAVENOUS ONCE
Status: DISCONTINUED | OUTPATIENT
Start: 2024-10-21 | End: 2024-10-22 | Stop reason: HOSPADM

## 2024-10-21 RX ORDER — PROPOFOL 10 MG/ML
INJECTION, EMULSION INTRAVENOUS CONTINUOUS PRN
Status: DISCONTINUED | OUTPATIENT
Start: 2024-10-21 | End: 2024-10-22

## 2024-10-21 RX ADMIN — FENTANYL CITRATE 50 MCG: 50 INJECTION INTRAMUSCULAR; INTRAVENOUS at 23:45

## 2024-10-21 RX ADMIN — DEXMEDETOMIDINE HYDROCHLORIDE 4 MCG: 100 INJECTION, SOLUTION INTRAVENOUS at 23:47

## 2024-10-21 RX ADMIN — SODIUM CHLORIDE, POTASSIUM CHLORIDE, SODIUM LACTATE AND CALCIUM CHLORIDE 1000 ML: 600; 310; 30; 20 INJECTION, SOLUTION INTRAVENOUS at 22:31

## 2024-10-21 RX ADMIN — ALBUMIN HUMAN: 0.05 INJECTION, SOLUTION INTRAVENOUS at 23:44

## 2024-10-21 RX ADMIN — DOXYCYCLINE 200 MG: 100 CAPSULE ORAL at 23:26

## 2024-10-21 RX ADMIN — MIDAZOLAM 1 MG: 1 INJECTION INTRAMUSCULAR; INTRAVENOUS at 23:45

## 2024-10-21 RX ADMIN — SODIUM CHLORIDE, POTASSIUM CHLORIDE, SODIUM LACTATE AND CALCIUM CHLORIDE: 600; 310; 30; 20 INJECTION, SOLUTION INTRAVENOUS at 23:29

## 2024-10-21 RX ADMIN — PROPOFOL 50 MCG/KG/MIN: 10 INJECTION, EMULSION INTRAVENOUS at 23:45

## 2024-10-21 RX ADMIN — FENTANYL CITRATE 25 MCG: 50 INJECTION INTRAMUSCULAR; INTRAVENOUS at 23:52

## 2024-10-21 RX ADMIN — ALBUMIN HUMAN: 0.05 INJECTION, SOLUTION INTRAVENOUS at 23:49

## 2024-10-21 RX ADMIN — TRANEXAMIC ACID 1000 MG: 1 INJECTION, SOLUTION INTRAVENOUS at 22:29

## 2024-10-21 RX ADMIN — SODIUM CHLORIDE: 0.9 INJECTION, SOLUTION INTRAVENOUS at 23:50

## 2024-10-21 RX ADMIN — ACETAMINOPHEN 975 MG: 325 TABLET ORAL at 23:25

## 2024-10-21 RX ADMIN — DEXAMETHASONE SODIUM PHOSPHATE 10 MG: 4 INJECTION, SOLUTION INTRA-ARTICULAR; INTRALESIONAL; INTRAMUSCULAR; INTRAVENOUS; SOFT TISSUE at 23:45

## 2024-10-21 RX ADMIN — DEXMEDETOMIDINE HYDROCHLORIDE 4 MCG: 100 INJECTION, SOLUTION INTRAVENOUS at 23:42

## 2024-10-21 RX ADMIN — ONDANSETRON 4 MG: 2 INJECTION INTRAMUSCULAR; INTRAVENOUS at 23:45

## 2024-10-21 ASSESSMENT — ACTIVITIES OF DAILY LIVING (ADL)
ADLS_ACUITY_SCORE: 35
ADLS_ACUITY_SCORE: 35

## 2024-10-22 ENCOUNTER — APPOINTMENT (OUTPATIENT)
Dept: INTERPRETER SERVICES | Facility: CLINIC | Age: 29
End: 2024-10-22
Payer: COMMERCIAL

## 2024-10-22 VITALS
BODY MASS INDEX: 30.21 KG/M2 | OXYGEN SATURATION: 100 % | SYSTOLIC BLOOD PRESSURE: 104 MMHG | TEMPERATURE: 97.3 F | HEART RATE: 84 BPM | RESPIRATION RATE: 17 BRPM | DIASTOLIC BLOOD PRESSURE: 58 MMHG | HEIGHT: 61 IN | WEIGHT: 160 LBS

## 2024-10-22 PROBLEM — Z34.81 ENCOUNTER FOR SUPERVISION OF OTHER NORMAL PREGNANCY IN FIRST TRIMESTER: Status: RESOLVED | Noted: 2024-10-14 | Resolved: 2024-10-22

## 2024-10-22 PROBLEM — N93.9 VAGINAL BLEEDING: Status: ACTIVE | Noted: 2024-10-22

## 2024-10-22 PROBLEM — R57.8 HEMORRHAGIC SHOCK (H): Status: ACTIVE | Noted: 2024-10-22

## 2024-10-22 PROBLEM — O03.31: Status: ACTIVE | Noted: 2024-10-22

## 2024-10-22 PROBLEM — O36.8390 UNABLE TO HEAR FETAL HEART TONES AS REASON FOR ULTRASOUND SCAN: Status: RESOLVED | Noted: 2024-10-14 | Resolved: 2024-10-22

## 2024-10-22 PROBLEM — O03.2 INCOMPLETE MISCARRIAGE WITH BLOOD CLOT: Status: ACTIVE | Noted: 2024-10-22

## 2024-10-22 LAB
ERYTHROCYTE [DISTWIDTH] IN BLOOD BY AUTOMATED COUNT: 14.6 % (ref 10–15)
ERYTHROCYTE [DISTWIDTH] IN BLOOD BY AUTOMATED COUNT: 14.6 % (ref 10–15)
HCT VFR BLD AUTO: 25.3 % (ref 35–47)
HCT VFR BLD AUTO: 27 % (ref 35–47)
HGB BLD-MCNC: 8.3 G/DL (ref 11.7–15.7)
HGB BLD-MCNC: 9.2 G/DL (ref 11.7–15.7)
HOLD SPECIMEN: NORMAL
MCH RBC QN AUTO: 28.8 PG (ref 26.5–33)
MCH RBC QN AUTO: 29.3 PG (ref 26.5–33)
MCHC RBC AUTO-ENTMCNC: 32.8 G/DL (ref 31.5–36.5)
MCHC RBC AUTO-ENTMCNC: 34.1 G/DL (ref 31.5–36.5)
MCV RBC AUTO: 86 FL (ref 78–100)
MCV RBC AUTO: 88 FL (ref 78–100)
PLATELET # BLD AUTO: 139 10E3/UL (ref 150–450)
PLATELET # BLD AUTO: 164 10E3/UL (ref 150–450)
RBC # BLD AUTO: 2.88 10E6/UL (ref 3.8–5.2)
RBC # BLD AUTO: 3.14 10E6/UL (ref 3.8–5.2)
WBC # BLD AUTO: 12.7 10E3/UL (ref 4–11)
WBC # BLD AUTO: 13.4 10E3/UL (ref 4–11)

## 2024-10-22 PROCEDURE — 250N000011 HC RX IP 250 OP 636: Performed by: ANESTHESIOLOGY

## 2024-10-22 PROCEDURE — P9045 ALBUMIN (HUMAN), 5%, 250 ML: HCPCS | Mod: JZ | Performed by: ANESTHESIOLOGY

## 2024-10-22 PROCEDURE — 258N000003 HC RX IP 258 OP 636: Performed by: ANESTHESIOLOGY

## 2024-10-22 PROCEDURE — 88305 TISSUE EXAM BY PATHOLOGIST: CPT | Mod: TC | Performed by: OBSTETRICS & GYNECOLOGY

## 2024-10-22 PROCEDURE — 85027 COMPLETE CBC AUTOMATED: CPT | Performed by: OBSTETRICS & GYNECOLOGY

## 2024-10-22 PROCEDURE — 96372 THER/PROPH/DIAG INJ SC/IM: CPT | Performed by: ANESTHESIOLOGY

## 2024-10-22 PROCEDURE — 88305 TISSUE EXAM BY PATHOLOGIST: CPT | Mod: 26 | Performed by: PATHOLOGY

## 2024-10-22 PROCEDURE — 258N000003 HC RX IP 258 OP 636: Performed by: OBSTETRICS & GYNECOLOGY

## 2024-10-22 PROCEDURE — 250N000013 HC RX MED GY IP 250 OP 250 PS 637: Performed by: OBSTETRICS & GYNECOLOGY

## 2024-10-22 PROCEDURE — 36415 COLL VENOUS BLD VENIPUNCTURE: CPT | Performed by: OBSTETRICS & GYNECOLOGY

## 2024-10-22 RX ORDER — NALOXONE HYDROCHLORIDE 0.4 MG/ML
0.2 INJECTION, SOLUTION INTRAMUSCULAR; INTRAVENOUS; SUBCUTANEOUS
Status: DISCONTINUED | OUTPATIENT
Start: 2024-10-22 | End: 2024-10-22 | Stop reason: HOSPADM

## 2024-10-22 RX ORDER — LIDOCAINE HYDROCHLORIDE 10 MG/ML
INJECTION, SOLUTION INFILTRATION; PERINEURAL PRN
Status: DISCONTINUED | OUTPATIENT
Start: 2024-10-21 | End: 2024-10-22 | Stop reason: HOSPADM

## 2024-10-22 RX ORDER — PROCHLORPERAZINE MALEATE 10 MG
10 TABLET ORAL EVERY 6 HOURS PRN
Status: DISCONTINUED | OUTPATIENT
Start: 2024-10-22 | End: 2024-10-22 | Stop reason: HOSPADM

## 2024-10-22 RX ORDER — ONDANSETRON 2 MG/ML
4 INJECTION INTRAMUSCULAR; INTRAVENOUS EVERY 6 HOURS PRN
Status: DISCONTINUED | OUTPATIENT
Start: 2024-10-22 | End: 2024-10-22 | Stop reason: HOSPADM

## 2024-10-22 RX ORDER — HYDROXYZINE HYDROCHLORIDE 25 MG/1
25 TABLET, FILM COATED ORAL EVERY 6 HOURS PRN
Status: DISCONTINUED | OUTPATIENT
Start: 2024-10-22 | End: 2024-10-22 | Stop reason: HOSPADM

## 2024-10-22 RX ORDER — BISACODYL 10 MG
10 SUPPOSITORY, RECTAL RECTAL DAILY PRN
Status: DISCONTINUED | OUTPATIENT
Start: 2024-10-22 | End: 2024-10-22 | Stop reason: HOSPADM

## 2024-10-22 RX ORDER — ACETAMINOPHEN 325 MG/1
975 TABLET ORAL EVERY 6 HOURS
Status: DISCONTINUED | OUTPATIENT
Start: 2024-10-22 | End: 2024-10-22 | Stop reason: HOSPADM

## 2024-10-22 RX ORDER — OXYTOCIN 10 [USP'U]/ML
INJECTION, SOLUTION INTRAMUSCULAR; INTRAVENOUS PRN
Status: DISCONTINUED | OUTPATIENT
Start: 2024-10-22 | End: 2024-10-22

## 2024-10-22 RX ORDER — POLYETHYLENE GLYCOL 3350 17 G/17G
17 POWDER, FOR SOLUTION ORAL DAILY PRN
Status: DISCONTINUED | OUTPATIENT
Start: 2024-10-23 | End: 2024-10-22 | Stop reason: HOSPADM

## 2024-10-22 RX ORDER — OXYCODONE HYDROCHLORIDE 5 MG/1
10 TABLET ORAL EVERY 4 HOURS PRN
Status: DISCONTINUED | OUTPATIENT
Start: 2024-10-22 | End: 2024-10-22 | Stop reason: HOSPADM

## 2024-10-22 RX ORDER — NALOXONE HYDROCHLORIDE 0.4 MG/ML
0.1 INJECTION, SOLUTION INTRAMUSCULAR; INTRAVENOUS; SUBCUTANEOUS
Status: DISCONTINUED | OUTPATIENT
Start: 2024-10-22 | End: 2024-10-22 | Stop reason: HOSPADM

## 2024-10-22 RX ORDER — OXYCODONE HYDROCHLORIDE 5 MG/1
5 TABLET ORAL
Status: DISCONTINUED | OUTPATIENT
Start: 2024-10-22 | End: 2024-10-22 | Stop reason: HOSPADM

## 2024-10-22 RX ORDER — ONDANSETRON 4 MG/1
4 TABLET, ORALLY DISINTEGRATING ORAL EVERY 30 MIN PRN
Status: DISCONTINUED | OUTPATIENT
Start: 2024-10-22 | End: 2024-10-22 | Stop reason: HOSPADM

## 2024-10-22 RX ORDER — IBUPROFEN 800 MG/1
800 TABLET, FILM COATED ORAL EVERY 6 HOURS PRN
Qty: 90 TABLET | Refills: 0 | Status: SHIPPED | OUTPATIENT
Start: 2024-10-22 | End: 2024-11-18

## 2024-10-22 RX ORDER — METHYLERGONOVINE MALEATE 0.2 MG/ML
INJECTION INTRAVENOUS PRN
Status: DISCONTINUED | OUTPATIENT
Start: 2024-10-22 | End: 2024-10-22

## 2024-10-22 RX ORDER — OXYCODONE HYDROCHLORIDE 5 MG/1
10 TABLET ORAL
Status: DISCONTINUED | OUTPATIENT
Start: 2024-10-22 | End: 2024-10-22 | Stop reason: HOSPADM

## 2024-10-22 RX ORDER — HYDROMORPHONE HCL IN WATER/PF 6 MG/30 ML
0.4 PATIENT CONTROLLED ANALGESIA SYRINGE INTRAVENOUS
Status: DISCONTINUED | OUTPATIENT
Start: 2024-10-22 | End: 2024-10-22 | Stop reason: HOSPADM

## 2024-10-22 RX ORDER — OXYTOCIN 10 [USP'U]/ML
INJECTION, SOLUTION INTRAMUSCULAR; INTRAVENOUS
Status: COMPLETED
Start: 2024-10-22 | End: 2024-10-22

## 2024-10-22 RX ORDER — NALOXONE HYDROCHLORIDE 0.4 MG/ML
0.4 INJECTION, SOLUTION INTRAMUSCULAR; INTRAVENOUS; SUBCUTANEOUS
Status: DISCONTINUED | OUTPATIENT
Start: 2024-10-22 | End: 2024-10-22 | Stop reason: HOSPADM

## 2024-10-22 RX ORDER — ACETAMINOPHEN 325 MG/1
650 TABLET ORAL EVERY 6 HOURS
Status: DISCONTINUED | OUTPATIENT
Start: 2024-10-25 | End: 2024-10-22 | Stop reason: HOSPADM

## 2024-10-22 RX ORDER — ACETAMINOPHEN 325 MG/1
325-650 TABLET ORAL EVERY 6 HOURS PRN
Qty: 90 TABLET | Refills: 0 | Status: SHIPPED | OUTPATIENT
Start: 2024-10-22 | End: 2024-11-18

## 2024-10-22 RX ORDER — HYDROMORPHONE HCL IN WATER/PF 6 MG/30 ML
0.4 PATIENT CONTROLLED ANALGESIA SYRINGE INTRAVENOUS EVERY 5 MIN PRN
Status: DISCONTINUED | OUTPATIENT
Start: 2024-10-22 | End: 2024-10-22 | Stop reason: HOSPADM

## 2024-10-22 RX ORDER — FENTANYL CITRATE 50 UG/ML
25 INJECTION, SOLUTION INTRAMUSCULAR; INTRAVENOUS EVERY 5 MIN PRN
Status: DISCONTINUED | OUTPATIENT
Start: 2024-10-22 | End: 2024-10-22 | Stop reason: HOSPADM

## 2024-10-22 RX ORDER — KETOROLAC TROMETHAMINE 15 MG/ML
15 INJECTION, SOLUTION INTRAMUSCULAR; INTRAVENOUS EVERY 6 HOURS
Status: DISCONTINUED | OUTPATIENT
Start: 2024-10-22 | End: 2024-10-22 | Stop reason: HOSPADM

## 2024-10-22 RX ORDER — LIDOCAINE 40 MG/G
CREAM TOPICAL
Status: DISCONTINUED | OUTPATIENT
Start: 2024-10-22 | End: 2024-10-22 | Stop reason: HOSPADM

## 2024-10-22 RX ORDER — SODIUM CHLORIDE, SODIUM LACTATE, POTASSIUM CHLORIDE, CALCIUM CHLORIDE 600; 310; 30; 20 MG/100ML; MG/100ML; MG/100ML; MG/100ML
INJECTION, SOLUTION INTRAVENOUS CONTINUOUS
Status: DISCONTINUED | OUTPATIENT
Start: 2024-10-22 | End: 2024-10-22 | Stop reason: HOSPADM

## 2024-10-22 RX ORDER — IBUPROFEN 800 MG/1
800 TABLET, FILM COATED ORAL EVERY 6 HOURS
Status: DISCONTINUED | OUTPATIENT
Start: 2024-10-22 | End: 2024-10-22 | Stop reason: HOSPADM

## 2024-10-22 RX ORDER — AMOXICILLIN 250 MG
1 CAPSULE ORAL 2 TIMES DAILY
Status: DISCONTINUED | OUTPATIENT
Start: 2024-10-22 | End: 2024-10-22 | Stop reason: HOSPADM

## 2024-10-22 RX ORDER — DEXAMETHASONE SODIUM PHOSPHATE 4 MG/ML
4 INJECTION, SOLUTION INTRA-ARTICULAR; INTRALESIONAL; INTRAMUSCULAR; INTRAVENOUS; SOFT TISSUE
Status: DISCONTINUED | OUTPATIENT
Start: 2024-10-22 | End: 2024-10-22 | Stop reason: HOSPADM

## 2024-10-22 RX ORDER — FERROUS SULFATE 325(65) MG
325 TABLET ORAL
Qty: 90 TABLET | Refills: 0 | Status: SHIPPED | OUTPATIENT
Start: 2024-10-22

## 2024-10-22 RX ORDER — SODIUM CHLORIDE 9 MG/ML
INJECTION, SOLUTION INTRAVENOUS CONTINUOUS
Status: DISCONTINUED | OUTPATIENT
Start: 2024-10-22 | End: 2024-10-22 | Stop reason: HOSPADM

## 2024-10-22 RX ORDER — HYDROMORPHONE HCL IN WATER/PF 6 MG/30 ML
0.2 PATIENT CONTROLLED ANALGESIA SYRINGE INTRAVENOUS
Status: DISCONTINUED | OUTPATIENT
Start: 2024-10-22 | End: 2024-10-22 | Stop reason: HOSPADM

## 2024-10-22 RX ORDER — HYDROMORPHONE HCL IN WATER/PF 6 MG/30 ML
0.2 PATIENT CONTROLLED ANALGESIA SYRINGE INTRAVENOUS EVERY 5 MIN PRN
Status: DISCONTINUED | OUTPATIENT
Start: 2024-10-22 | End: 2024-10-22 | Stop reason: HOSPADM

## 2024-10-22 RX ORDER — ONDANSETRON 2 MG/ML
4 INJECTION INTRAMUSCULAR; INTRAVENOUS EVERY 30 MIN PRN
Status: DISCONTINUED | OUTPATIENT
Start: 2024-10-22 | End: 2024-10-22 | Stop reason: HOSPADM

## 2024-10-22 RX ORDER — SODIUM PHOSPHATE,MONO-DIBASIC 19G-7G/118
1 ENEMA (ML) RECTAL
Status: DISCONTINUED | OUTPATIENT
Start: 2024-10-22 | End: 2024-10-22 | Stop reason: HOSPADM

## 2024-10-22 RX ORDER — FENTANYL CITRATE 50 UG/ML
50 INJECTION, SOLUTION INTRAMUSCULAR; INTRAVENOUS EVERY 5 MIN PRN
Status: DISCONTINUED | OUTPATIENT
Start: 2024-10-22 | End: 2024-10-22 | Stop reason: HOSPADM

## 2024-10-22 RX ORDER — OXYCODONE HYDROCHLORIDE 5 MG/1
5 TABLET ORAL EVERY 4 HOURS PRN
Status: DISCONTINUED | OUTPATIENT
Start: 2024-10-22 | End: 2024-10-22 | Stop reason: HOSPADM

## 2024-10-22 RX ORDER — ONDANSETRON 4 MG/1
4 TABLET, ORALLY DISINTEGRATING ORAL EVERY 6 HOURS PRN
Status: DISCONTINUED | OUTPATIENT
Start: 2024-10-22 | End: 2024-10-22 | Stop reason: HOSPADM

## 2024-10-22 RX ADMIN — METHYLERGONOVINE MALEATE 200 MCG: 0.2 INJECTION INTRAVENOUS at 00:10

## 2024-10-22 RX ADMIN — ACETAMINOPHEN 975 MG: 325 TABLET ORAL at 09:05

## 2024-10-22 RX ADMIN — SODIUM CHLORIDE: 9 INJECTION, SOLUTION INTRAVENOUS at 01:51

## 2024-10-22 RX ADMIN — OXYTOCIN 20 UNITS: 10 INJECTION, SOLUTION INTRAMUSCULAR; INTRAVENOUS at 00:05

## 2024-10-22 RX ADMIN — SODIUM CHLORIDE, POTASSIUM CHLORIDE, SODIUM LACTATE AND CALCIUM CHLORIDE: 600; 310; 30; 20 INJECTION, SOLUTION INTRAVENOUS at 00:37

## 2024-10-22 RX ADMIN — ALBUMIN HUMAN: 0.05 INJECTION, SOLUTION INTRAVENOUS at 00:12

## 2024-10-22 RX ADMIN — SENNOSIDES AND DOCUSATE SODIUM 1 TABLET: 8.6; 5 TABLET ORAL at 09:05

## 2024-10-22 RX ADMIN — IBUPROFEN 800 MG: 800 TABLET ORAL at 09:05

## 2024-10-22 RX ADMIN — IBUPROFEN 800 MG: 800 TABLET ORAL at 15:16

## 2024-10-22 RX ADMIN — ACETAMINOPHEN 975 MG: 325 TABLET ORAL at 15:18

## 2024-10-22 ASSESSMENT — COLUMBIA-SUICIDE SEVERITY RATING SCALE - C-SSRS
2. HAVE YOU ACTUALLY HAD ANY THOUGHTS OF KILLING YOURSELF IN THE PAST MONTH?: NO
6. HAVE YOU EVER DONE ANYTHING, STARTED TO DO ANYTHING, OR PREPARED TO DO ANYTHING TO END YOUR LIFE?: NO
1. IN THE PAST MONTH, HAVE YOU WISHED YOU WERE DEAD OR WISHED YOU COULD GO TO SLEEP AND NOT WAKE UP?: NO

## 2024-10-22 ASSESSMENT — ACTIVITIES OF DAILY LIVING (ADL)
ADLS_ACUITY_SCORE: 25
ADLS_ACUITY_SCORE: 27
ADLS_ACUITY_SCORE: 18
ADLS_ACUITY_SCORE: 27
ADLS_ACUITY_SCORE: 18
ADLS_ACUITY_SCORE: 21
ADLS_ACUITY_SCORE: 18
ADLS_ACUITY_SCORE: 18
ADLS_ACUITY_SCORE: 21
ADLS_ACUITY_SCORE: 27
ADLS_ACUITY_SCORE: 18
ADLS_ACUITY_SCORE: 18

## 2024-10-22 NOTE — H&P
"[facility] OB/GYN History and Physical    Joan Ashley MRN# [id number]   Age: 28 year old YOB: 1995     Date of Admission:  10/21/2024    Primary care provider: Cynthia Jones           Chief Complaint:   Joan Ashley is a 28 year old female who is  reported 14 weeks by dates but imaging on 10/15 demonstrated a 9 week gestational sac only and subchorionic hemorrhage without evidence of fetus or cardiac activity.  Patient is brought in via ambulance with syncope, hypotension and active vaginal bleeding.  She is accompanied with her partner.  History is obtained through patient, family, chart review and language line interprteter.          Pregnancy history:     OBSTETRIC HISTORY:   Prior VAVD with care at [facility].    OB History    Para Term  AB Living   2 1 1 0 0 1   SAB IAB Ectopic Multiple Live Births   0 0 0 0 1      # Outcome Date GA Lbr Jose/2nd Weight Sex Type Anes PTL Lv   2 Current            1 Term 23 41w3d  3.74 kg (8 lb 3.9 oz) M Vag-Vacuum IV, EPI, Spinal N MAYITO      Complications: Intraamniotic Infection      Name: DIDI ASHLEY-JOAN      Apgar1: 8  Apgar5: 9       EDC: Estimated Date of Delivery: 25    Prenatal Labs:   Lab Results   Component Value Date    AS Negative 10/14/2024    HEPBANG Nonreactive 10/14/2024    CHPCRT Negative 10/14/2024    GCPCRT Negative 10/14/2024    HGB 8.9 (L) 10/21/2024       GBS Status:   No results found for: \"GBS\"    Active Problem List  Patient Active Problem List   Diagnosis    Language barrier    Unable to hear fetal heart tones as reason for ultrasound scan    Encounter for supervision of other normal pregnancy in first trimester       Medication Prior to Admission  (Not in a hospital admission)  .        Maternal Past Medical History:     Past Medical History:   Diagnosis Date    Anemia of pregnancy 2023    Chorioamnionitis in third " trimester 2023    Hemorrhage 2023    Laceration, obstetrical, fourth degree 2023    Vacuum-assisted vaginal delivery 2023       PSH denies                       Physical Exam:   Vitals were reviewed  Patient Vitals for the past 8 hrs:   BP Temp Pulse Resp SpO2   10/21/24 2237 99/58 98  F (36.7  C) 69 22 100 %   10/21/24 2230 94/59 -- 76 16 99 %   10/21/24 2222 95/55 97.9  F (36.6  C) 73 15 --   10/21/24 2216 91/51 -- 72 12 90 %   10/21/24 2211 96/53 -- 64 22 100 %   10/21/24 2206 (!) 55/30 -- 65 (!) 35 100 %   10/21/24 2201 (!) 56/32 -- 61 20 (!) 89 %   10/21/24 2146 90/53 -- 78 14 100 %     Alert, shaky, pale  CV tachycardic  Resp non labored  Ab soft, not examined  Speculum exam per ER MD- os slightly open, active bleeding, no obvious tissue    Hb 8.9 from 13.4 on 10/3/24  WBC 18.7                 Assessment:   Jean Paul Goodman is a 13w2d pregnant female  with incomplete miscarriage and hemorrhagic shock          Plan:   FULL CODE, admit, to OR for suction D and C.  TXA given.  Discussed risk of bleeding, infection, injury as well as rare risk of life saving procedures.  All questions answered.  Will observe overnight after surgery to further evaluate hemodynamic status.  Discussed with anesthesia.    Henrietta Arce MD

## 2024-10-22 NOTE — ED NOTES
Bed: JNED-06  Expected date: 10/21/24  Expected time: 9:30 PM  Means of arrival: Ambulance  Comments:  SPF  28F  Syncopal x 2  Pregnant 14 weeks

## 2024-10-22 NOTE — PHARMACY-ADMISSION MEDICATION HISTORY
Pharmacist Admission Medication History    Admission medication history is complete. The information provided in this note is only as accurate as the sources available at the time of the update.    Information Source(s): Patient and CareEverywhere/SureScripts via in-person    Pertinent Information: patient states not taking any other prescription or over the counter medications besides prenatal multivitamin.     Changes made to PTA medication list:  Added: None  Deleted: None  Changed: None    Allergies reviewed with patient and updates made in EHR: no    Medication History Completed By: Brandon RonquilloD10/22/2024 7:26 AM    PTA Med List   Medication Sig Last Dose    Prenatal Vit-Fe Fumarate-FA (PRENATAL MULTIVITAMIN  PLUS IRON) 27-1 MG TABS Take 1 tablet by mouth daily. Past Week

## 2024-10-22 NOTE — PLAN OF CARE
"PRIMARY DIAGNOSIS: \"GENERIC\" NURSING  OUTPATIENT/OBSERVATION GOALS TO BE MET BEFORE DISCHARGE:  ADLs back to baseline: No    Activity and level of assistance: Up with standby assistance.    Pain status: Pain free.    Return to near baseline physical activity: No     Discharge Planner Nurse   Safe discharge environment identified: Yes  Barriers to discharge: Yes          Please review provider order for any additional goals.   Nurse to notify provider when observation goals have been met and patient is ready for discharge.  " no

## 2024-10-22 NOTE — ANESTHESIA PREPROCEDURE EVALUATION
Anesthesia Pre-Procedure Evaluation    Patient: Jean Paul Goodman   MRN: [id number] : 1995        Procedure : Procedure(s):  DILATION AND CURETTAGE, UTERUS, USING SUCTION          Past Medical History:   Diagnosis Date    Anemia of pregnancy 2023    Chorioamnionitis in third trimester 2023    Hemorrhage 2023    Laceration, obstetrical, fourth degree 2023    Vacuum-assisted vaginal delivery 2023      Past Surgical History:   Procedure Laterality Date    NO HISTORY OF SURGERY        No Known Allergies   Social History     Tobacco Use    Smoking status: Never     Passive exposure: Never    Smokeless tobacco: Never   Substance Use Topics    Alcohol use: Not Currently      Wt Readings from Last 1 Encounters:   10/21/24 72.6 kg (160 lb)        Anesthesia Evaluation            ROS/MED HX  ENT/Pulmonary:  - neg pulmonary ROS     Neurologic:  - neg neurologic ROS     Cardiovascular:  - neg cardiovascular ROS     METS/Exercise Tolerance:     Hematologic:       Musculoskeletal:       GI/Hepatic:  - neg GI/hepatic ROS     Renal/Genitourinary:  - neg Renal ROS     Endo:  - neg endo ROS     Psychiatric/Substance Use:       Infectious Disease:  - neg infectious disease ROS     Malignancy:  - neg malignancy ROS     Other:  - neg other ROS          Physical Exam    Airway        Mallampati: II    Neck ROM: full     Respiratory Devices and Support         Dental           Cardiovascular   cardiovascular exam normal          Pulmonary   pulmonary exam normal                OUTSIDE LABS:  CBC:   Lab Results   Component Value Date    WBC 18.7 (H) 10/21/2024    WBC 9.5 10/03/2024    HGB 8.9 (L) 10/21/2024    HGB 13.4 10/03/2024    HCT 26.4 (L) 10/21/2024    HCT 39.0 10/03/2024     10/21/2024     10/03/2024     BMP:   Lab Results   Component Value Date     10/21/2024     2024    POTASSIUM 3.6 10/21/2024    POTASSIUM 3.7 2024    CHLORIDE 111 (H) 10/21/2024     "CHLORIDE 102 09/17/2024    CO2 17 (L) 10/21/2024    CO2 22 09/17/2024    BUN 16.3 10/21/2024    BUN 8.2 09/17/2024    CR 0.64 10/21/2024    CR 0.47 (L) 09/17/2024     (H) 10/21/2024    GLC 82 09/17/2024     COAGS: No results found for: \"PTT\", \"INR\", \"FIBR\"  POC:   Lab Results   Component Value Date    HCG Positive (A) 10/03/2024     HEPATIC:   Lab Results   Component Value Date    ALBUMIN 4.2 09/17/2024    PROTTOTAL 7.3 09/17/2024    ALT 14 09/17/2024    AST 16 09/17/2024    ALKPHOS 62 09/17/2024    BILITOTAL 0.2 09/17/2024     OTHER:   Lab Results   Component Value Date    LIANNA 7.4 (L) 10/21/2024       Anesthesia Plan    ASA Status:  2, emergent       Anesthesia Type: MAC.              Consents    Anesthesia Plan(s) and associated risks, benefits, and realistic alternatives discussed. Questions answered and patient/representative(s) expressed understanding.     - Discussed:     - Discussed with:  Patient            Postoperative Care       PONV prophylaxis: Ondansetron (or other 5HT-3), Dexamethasone or Solumedrol     Comments:               Anant Schulte MD    I have reviewed the pertinent notes and labs in the chart from the past 30 days and (re)examined the patient.  Any updates or changes from those notes are reflected in this note.      # Hyperchloremia: Highest Cl = 111 mmol/L in last 2 days, will monitor as appropriate                 # Anemia: based on hgb <11                 "

## 2024-10-22 NOTE — ANESTHESIA CARE TRANSFER NOTE
Patient: Jean Paul Goodman    Procedure: Procedure(s):  DILATION AND CURETTAGE, UTERUS, USING SUCTION       Diagnosis: Incomplete miscarriage with blood clot [O03.2]  Diagnosis Additional Information: No value filed.    Anesthesia Type:   MAC     Note:    Oropharynx: oropharynx clear of all foreign objects  Level of Consciousness: awake  Oxygen Supplementation: nasal cannula  Level of Supplemental Oxygen (L/min / FiO2): 6L  Independent Airway: airway patency satisfactory and stable  Dentition: dentition unchanged  Vital Signs Stable: post-procedure vital signs reviewed and stable    Patient transferred to: PACU    Handoff Report: Identifed the Patient, Identified the Reponsible Provider, Reviewed the pertinent medical history, Discussed the surgical course, Reviewed Intra-OP anesthesia mangement and issues during anesthesia, Set expectations for post-procedure period and Allowed opportunity for questions and acknowledgement of understanding      Vitals:  Vitals Value Taken Time   BP 91/56 10/22/24 0025   Temp 97.6    Pulse 90 10/22/24 0026   Resp 16 10/22/24 0026   SpO2 100 % 10/22/24 0026   Vitals shown include unfiled device data.    Electronically Signed By: SARAH Khan CRNA  October 22, 2024  12:27 AM

## 2024-10-22 NOTE — PLAN OF CARE
Problem: Infection  Goal: Absence of Infection Signs and Symptoms  Outcome: Progressing   Goal Outcome Evaluation:    Patient hmong speaking,  services used for communication. Patient reports that she is not having any procedural pain but does have a slight headache that was controlled with her scheduled Tylenol and Ibuprofen. Informed patient that we needed to get her up and walking to promote circulation and remove palacios catheter. Patient needs extra encouragement and education regarding these things. When giving her morning medications patient would not take them independently, asked writer to put the medications in her mouth for her and help her with her water. Patient has advanced diet, tolerating small amounts, denies nausea. Will possibly discharge this afternoon depending on progression.        Patient currently in office with nurse at this time

## 2024-10-22 NOTE — OP NOTE
PROCEDURE: SUCTION D & C    DATE OF SERVICE: 10/21/2024    PREOPERATIVE DIAGNOSIS: incomplete miscarriage, hemorrhagic shock    POSTOPERATIVE DIAGNOSIS: Same    PROCEDURE: Suction dilatation and curettage    SURGEON(S): Henrietta Arce MD    ANESTHESIA: MAC with lidocaine    ESTIMATED BLOOD LOSS: 400 mL including spontaneous clot expelled with prep/exam      SPECIMENS: Uterine contents, sent to pathology    COMPLICATIONS: None.     HISTORY OF PRESENT ILLNESS:  This is a 28 year old female at 14 weeks by dates with known imaging demonstrating a 9 week gestational sac without evidence of fetus or cardiac activity that presented to the ER via ambulance with syncope, hypotension, diaphoreses and active vaginal bleeding. She was given TXA, IV fluids and blood.  The risks, benefits and alternatives to the procedure were discussed with her at length with an  to include bleeding, infection, injury, need for blood transfusion and possible need for life saving hysterectomy .  She expressed understanding and wished to proceed.     OPERATIVE FINDINGS:  Marked amount of products of conception/ placental tissue    PROCEDURE NOTE:  Patient was brought to the operating room and after induction of anesthesia was prepped and draped in the dorsal lithotomy position.  A time out was called and the patient and the procedure were verified.  The bladder was drained of urine with an indwelling palacios.  A sterile speculum was placed.  Marked products were at the os and teased out with polyp forceps.  The anterior lip of the cervix was grasped with a single tooth tenaculum.  Lidocaine instilled. The cervix was already 1.5 cm dilated.  A # 11 suction curette was induced and rotated to clear the uterus of marked tissue.  A sharp curette was introduced. The curette was downsized to a #8 to assure a smooth cavity. Once it was felt that all tissue was removed a decision was made to terminate the procedure. Sponge and instrument  counts were correct. Patient received an additional unit of PRBCs, IM methergine and IV pitocin.      Henrietta Arce MD

## 2024-10-22 NOTE — PROGRESS NOTES
Patient voided after palacios removed. Will discharge shortly when  comes to get her.    Raven Ramirezharriett is a 70year old male.   HPI:   Elicia Davies is here for annual [facility] wellness exam.    He is feeling well and no new issues    Has been on metformin for about 1yr - A1C 6.1    Calcium 8.3     PVD - no recurrence of claudication since stenting about headaches    EXAM:   /66   Pulse 75   Temp 98.4 °F (36.9 °C)   Ht 5' 10\" (1.778 m)   Wt 195 lb 6.4 oz (88.6 kg)   SpO2 99%   BMI 28.04 kg/m²   GENERAL: well developed, well nourished,in no apparent distress  SKIN: no rashes,no suspicious lesions  HE Fall/Risk Scorin          Depression Screening (PHQ-2/PHQ-9): Over the LAST 2 WEEKS                      Advance Directives     Do you have a healthcare power of ?: Yes    Do you have a living will?: No     Hearing coverage. PREVENTATIVE SERVICES  INDICATIONS AND SCHEDULE Internal Lab or Procedure External Lab or Procedure   Diabetes Screening      HbgA1C   Annually HgbA1C (%)   Date Value   06/07/2021 6.1 (H)    No flowsheet data found.     Fasting Blood Sugar (FS found.    Creatinine  Annually Creatinine (mg/dL)   Date Value   06/07/2021 1.17    No flowsheet data found. Digoxin Serum Conc  Annually No results found for: DIGOXIN No flowsheet data found.     Diabetes      HgbA1C  Annually HgbA1C (%)   Date Value

## 2024-10-22 NOTE — DISCHARGE SUMMARY
[facility] Discharge Summary    Jean Paul Goodman MRN# [id number]   Age: 28 year old YOB: 1995     Date of Admission:  10/21/2024  Date of Discharge::  10/22/2024   Admitting Physician:  10/22/2024  Discharge Physician:  SARAH Morrissey CNP     Home clinic: [facility]          Admission Diagnoses:   Vaginal bleeding [N93.9]  Incomplete miscarriage with blood clot [O03.2]          Discharge Diagnosis:     Patient Active Problem List   Diagnosis    Language barrier    Incomplete miscarriage with shock    Hemorrhagic shock (H)    Vaginal bleeding    Incomplete miscarriage with blood clot             Procedures:     Procedure(s):  Dilation and curettage       No procedures performed during this admission           Medications Prior to Admission:     Medications Prior to Admission   Medication Sig Dispense Refill Last Dose    Prenatal Vit-Fe Fumarate-FA (PRENATAL MULTIVITAMIN  PLUS IRON) 27-1 MG TABS Take 1 tablet by mouth daily. 90 tablet 3 Past Week             Discharge Medications:     Current Discharge Medication List        START taking these medications    Details   acetaminophen (TYLENOL) 325 MG tablet Take 1-2 tablets (325-650 mg) by mouth every 6 hours as needed.  Qty: 90 tablet, Refills: 0    Associated Diagnoses: Hemorrhagic shock (H)      ferrous sulfate (FEROSUL) 325 (65 Fe) MG tablet Take 1 tablet (325 mg) by mouth daily (with breakfast).  Qty: 90 tablet, Refills: 0    Associated Diagnoses: Hemorrhagic shock (H)      ibuprofen (ADVIL/MOTRIN) 800 MG tablet Take 1 tablet (800 mg) by mouth every 6 hours as needed for moderate pain.  Qty: 90 tablet, Refills: 0    Associated Diagnoses: Hemorrhagic shock (H)           CONTINUE these medications which have NOT CHANGED    Details   Prenatal Vit-Fe Fumarate-FA (PRENATAL MULTIVITAMIN  PLUS IRON) 27-1 MG TABS Take 1 tablet by mouth daily.  Qty: 90 tablet, Refills: 3    Associated Diagnoses: Encounter for supervision of  other normal pregnancy in first trimester                   Consultations:   No consultations were requested during this admission          Brief History of Illness:   Reason for admission requiring a surgical or invasive procedure: Suction D&C   Patient Active Problem List   Diagnosis    Language barrier    Incomplete miscarriage with shock    Hemorrhagic shock (H)    Vaginal bleeding    Incomplete miscarriage with blood clot  Acute blood loss anemia.       The patient underwent the following procedure(s):    -Admit to gynecology   There were no immediate complications during this procedure.    Please refer to the full operative summary for details.             Hospital Course:   The patient's hospital course was unremarkable.  She recovered as anticipated and experienced no post-operative complications.           Discharge Instructions and Follow-Up:     Discharge diet: Regular   Discharge activity: Activity as tolerated   Discharge follow-up: Follow up with [facility] in 7-14 days   Wound care Drink plenty of fluids           Discharge Disposition:     Discharged to home      Attestation:  I have reviewed today's vital signs, notes, medications, labs and imaging.    SARAH Morrissey [facility]  [facility]  [phone]

## 2024-10-22 NOTE — PLAN OF CARE
Problem: Pain Acute  Goal: Optimal Pain Control and Function  Outcome: Progressing   Goal Outcome Evaluation:       Pt alert/oriented, denies pain. Hmong speaking, VS trending good, no fever or sign of infection noted. NS infusing at 125 ml/hr, to be discontinued when patient starts PO intake. Barboza cath to stay for the night and discontinued during the day. Independent at baseline, pale, call light within reach.

## 2024-10-22 NOTE — ED TRIAGE NOTES
Patient 14 weeks pregnant and presents tonight with possible miscarriage. Had US on 10/14 that resulted in no fetal heart tones. On 10/17 she had moderate discharge but stopped. Around 1900 tonight started having heavy discharge. Patient appears diaphoretic, pale. Denies CP, SOB, N/V. Endorses lightheaded. 3 synocopol episodes tonight. VSS but became hypotensive during triage  and assessment. Provider to room     Triage Assessment (Adult)       Row Name 10/21/24 2852          Triage Assessment    Airway WDL WDL        Respiratory WDL    Respiratory WDL WDL        Cardiac WDL    Cardiac WDL WDL     Cardiac Rhythm NSR        Peripheral/Neurovascular WDL    Peripheral Neurovascular WDL WDL

## 2024-10-22 NOTE — ED NOTES
Emergency blood transfusion ordered per Dr. Yepez, with verbal readback. Consent not obtained for emergent transfusion.

## 2024-10-22 NOTE — OR NURSING
2326 pt. states she feels dizzy. Fluids finished up approx.2310 from ED and also received one unit PRBC's from ED. HOB flat and BP checked-66/30 and pt. diaphoretic. Slightly more pale than upon arrival from ED. IV Fluids started and CRNA at bedside, and added albumin. BP up to 74/48 and heading into OR for the procedure. Dr. Arce notified.  at bedside during episode.

## 2024-10-22 NOTE — ED NOTES
Patient cleaned up, pad soaked through with heavy bloody discharge and fetal tissue. Collected fetal tissue and CATARINA Guerra, set up pelvic exam per Dr. Yepez

## 2024-10-22 NOTE — ANESTHESIA POSTPROCEDURE EVALUATION
Patient: Jean Paul Goodman    Procedure: Procedure(s):  DILATION AND CURETTAGE, UTERUS, USING SUCTION       Anesthesia Type:  MAC    Note:  Disposition: Inpatient   Postop Pain Control: Uneventful            Sign Out: Well controlled pain   PONV: No   Neuro/Psych: Uneventful            Sign Out: Acceptable/Baseline neuro status   Airway/Respiratory: Uneventful            Sign Out: Acceptable/Baseline resp. status   CV/Hemodynamics: Uneventful            Sign Out: Acceptable CV status; No obvious hypovolemia; No obvious fluid overload   Other NRE:    DID A NON-ROUTINE EVENT OCCUR?            Last vitals:  Vitals Value Taken Time   /63 10/22/24 0110   Temp 36.2  C (97.2  F) 10/22/24 0100   Pulse 85 10/22/24 0111   Resp 12 10/22/24 0110   SpO2 100 % 10/22/24 0111   Vitals shown include unfiled device data.    Electronically Signed By: Anant Schulte MD  October 22, 2024  1:55 AM

## 2024-10-22 NOTE — ED PROVIDER NOTES
Emergency Department Encounter         FINAL IMPRESSION:  Vaginal bleeding, hemorrhagic shock          ED COURSE AND MEDICAL DECISION MAKING       ED Course as of 10/21/24 2239   Mon Oct 21, 2024   2214 Patient is a G2, P1 who had a spontaneous miscarriage at 14 weeks that was diagnostic on ultrasound 6 days ago, here with heavy vaginal bleeding and syncope x 2.   used at bedside with  at bedside.  Patient states that she began bleeding earlier this morning.  Had some spotting a couple days ago.  Had fainting x 2 today with generalized weakness.  No chest pain headache or trouble breathing.  On arrival patient looks unwell.  Hypotensive.  Alert and oriented.  Patient had a large amount of blood that was already cleaned up by the tech as well as nursing staff before I came into the room.  When I saw her, she dropped her pressures again 50s over 30s.  Diaphoretic.  Looks toxic.  Bedside pelvic examination revealing large amount of clots however when os was visualized, I did not see any fetal tissue although nursing staff reports that she passed potentially some fetal tissue in the bedpan.  Stat page sent off to OB/GYN.  Also called for 1 unit of uncrossed blood.  Fluids ordered on pressure bag.   2234 Will be at bedside.  Will be taking patient to the OR for D&C.  Blood infusing.  Patient's pressures have stabilized.  No tachycardia.  Still does not look well, pale.          9:58 PM I met the patient and performed my initial interview and exam. Performed a pelvic with nurse chaperone.  10:16 PM I spoke to Dr. Arce, OB, regarding patient plan of care.  10:18 PM Rechecked patient.  10:24 PM Rechecked patient. OB at bedside.  10:36 PM I spoke with OB                Medical Decision Making  Obtained supplemental history:Supplemental history obtained?: Documented in chart  Reviewed external records: External records reviewed?: Documented in chart  Care impacted by chronic illness:Documented in  Chart  Did you consider but not order tests?: Work up considered but not performed and documented in chart, if applicable  Did you interpret images independently?: Independent interpretation of ECG and images noted in documentation, when applicable.  Consultation discussion with other provider:Did you involve another provider (consultant, , pharmacy, etc.)?: I discussed the care with another health care provider, see documentation for details.  Admit.    MIPS: Not Applicable                Critical Care     Performed by: Anish Yepez or    Authorized by: Anish Yepez  Total critical care time: 60 minutes  Critical care was necessary to treat or prevent imminent or life-threatening deterioration of the following conditions: Hemorrhagic shock  Critical care was time spent personally by me on the following activities: development of treatment plan with patient or surrogate, discussions with consultants, examination of patient, evaluation of patient's response to treatment, obtaining history from patient or surrogate, ordering and performing treatments and interventions, ordering and review of laboratory studies, ordering and review of radiographic studies, re-evaluation of patient's condition and monitoring for potential decompensation.  Critical care time was exclusive of separately billable procedures and treating other patients.'    At the conclusion of the encounter I discussed the results of all the tests and the disposition. The questions were answered. The patient or family acknowledged understanding and was agreeable with the care plan.        MEDICATIONS GIVEN IN THE EMERGENCY DEPARTMENT:  Medications   tranexamic acid 1 g in 100 mL NS IV bag (premix) (has no administration in time range)   lactated ringers BOLUS 1,000 mL (1,000 mLs Intravenous $New Bag 10/21/24 2231)   tranexamic acid (CYKLOKAPRON) 1000 MG/10ML injection (1,000 mg  $Given 10/21/24 2229)       NEW PRESCRIPTIONS STARTED AT TODAY'S ED VISIT:  New  Prescriptions    No medications on file       HPI     Patient information obtained from: patient, patient's     Use of : Yes ( Phone ) - Language Juan Goodman is a 28 year old female with no pertinent history on file who presents to this ED via EMS for evaluation of vaginal bleeding    The patient was 14 weeks pregnant and then went to an OB appointment on 14-Oct-2024, where no fetal heartbeat was detected. The patient was notified that she would be called to set up an appointment to discuss what to do about the miscarriage. She had not been called yet when on 17-Oct the patient started having mild vaginal bleeding. Over the next few days into today, her vaginal bleeding increased and now is bleeding a lot.     The patient follows with [facility]. This is her second pregnancy. She denies abdominal pain, chest pain, and shortness of breath.      MEDICAL HISTORY     Past Medical History:   Diagnosis Date    Anemia of pregnancy 05/22/2023    Chorioamnionitis in third trimester 05/20/2023    Hemorrhage 06/05/2023    Laceration, obstetrical, fourth degree 05/22/2023    Vacuum-assisted vaginal delivery 05/20/2023       Past Surgical History:   Procedure Laterality Date    NO HISTORY OF SURGERY         Social History     Tobacco Use    Smoking status: Never     Passive exposure: Never    Smokeless tobacco: Never   Vaping Use    Vaping status: Never Used   Substance Use Topics    Alcohol use: Not Currently    Drug use: Never       Prenatal Vit-Fe Fumarate-FA (PRENATAL MULTIVITAMIN  PLUS IRON) 27-1 MG TABS            PHYSICAL EXAM     BP 99/58   Pulse 69   Temp 98  F (36.7  C)   Resp 22   LMP 07/20/2024 (Exact Date)   SpO2 100%       PHYSICAL EXAM:     General: Patient appears unwell, looks toxic, diaphoretic  HEENT: Moist mucous membranes,  No head trauma.    Cardiovascular: Normal rate, normal rhythm, no extremity edema.  No appreciable murmur. Hypotensive.  Respiratory: No  signs of respiratory distress, lungs are clear to auscultation bilaterally with no wheezes rhonchi or rales.  Abdominal: Soft, nontender, nondistended, no palpable masses, no guarding, no rebound  Genitourinary: Patient had a large amount of blood that was already cleaned up by the tech as well as nursing staff before I came into the room. Bedside pelvic examination revealing large amount of clots however when os was visualized, I did not see any fetal tissue although nursing staff reports that she passed potentially some fetal tissue in the bedpan  Musculoskeletal: Full range of motion of joints, no deformities appreciated.  Neurological: Alert and oriented, grossly neurologically intact.  Psychological: Normal affect and mood.  Integument: No rashes appreciated. Pale      RESULTS       Labs Ordered and Resulted from Time of ED Arrival to Time of ED Departure   CBC WITH PLATELETS AND DIFFERENTIAL - Abnormal       Result Value    WBC Count 18.7 (*)     RBC Count 3.01 (*)     Hemoglobin 8.9 (*)     Hematocrit 26.4 (*)     MCV 88      MCH 29.6      MCHC 33.7      RDW 13.5      Platelet Count 222      % Neutrophils 79      % Lymphocytes 15      % Monocytes 4      % Eosinophils 1      % Basophils 1      % Immature Granulocytes 1      NRBCs per 100 WBC 0      Absolute Neutrophils 14.9 (*)     Absolute Lymphocytes 2.8      Absolute Monocytes 0.8      Absolute Eosinophils 0.1      Absolute Basophils 0.1      Absolute Immature Granulocytes 0.1      Absolute NRBCs 0.0     INR   PARTIAL THROMBOPLASTIN TIME   BASIC METABOLIC PANEL   PREPARE RED BLOOD CELLS (UNIT)    ISSUE DATE AND TIME [id number]      Blood Component Type Red Blood Cells      Product Code [id number]      Unit Status Issued      Unit Number [id number]      UNIT ABO/RH O-      CODING SYSTEM [id number]      UNIT TYPE ISBT 9500     TYPE AND SCREEN, ADULT    SPECIMEN EXPIRATION DATE [id number]     PREPARE RED BLOOD CELLS (UNIT)    ISSUE DATE AND TIME  [id number]      Blood Component Type Red Blood Cells      Product Code [id number]      Unit Status Transfused      Unit Number [id number]      UNIT ABO/RH O-      CODING SYSTEM [id number]      UNIT TYPE ISBT 9500     PREPARE RED BLOOD CELLS (UNIT)   PREPARE EMERGENCY UNCROSSED RED BLOOD CELLS (UNIT)   TRANSFUSE EMERGENCY UNCROSSED RED BLOOD CELLS (UNIT)   ABO/RH TYPE AND SCREEN       No orders to display         PROCEDURES:  Procedures:  Procedures       I, Amos De Los Santos am serving as a scribe to document services personally performed by Anish Yepez DO, based on my observations and the provider's statements to me.  I, Anish Yepez DO, attest that Amos De Los Santos is acting in a scribe capacity, has observed my performance of the services and has documented them in accordance with my direction.    Anish Yepez DO  Emergency Medicine  [facility] EMERGENCY DEPARTMENT       Anish Yepez DO  10/21/24 8581

## 2024-10-22 NOTE — CARE PLAN
"PRIMARY DIAGNOSIS: \"GENERIC\" NURSING  OUTPATIENT/OBSERVATION GOALS TO BE MET BEFORE DISCHARGE:  ADLs back to baseline: No     Activity and level of assistance: Up with standby assistance.     Pain status: Pain free.     Return to near baseline physical activity: No             Discharge Planner Nurse  Safe discharge environment identified: Yes  Barriers to discharge: Yes             Please review provider order for any additional goals.   Nurse to notify provider when observation goals have been met and patient is ready for discharge.  "

## 2024-11-06 NOTE — TELEPHONE ENCOUNTER
----- Message from Marilu Madrigal sent at 9/19/2024 10:43 AM CDT -----  CBC with hemoglobin within normal range. WBC (white cell count) elevated at 13.7. Should have you come back in 1 week to have CBC rechecked. Please make lab only visit.     Results letter sent for other labs.    ----- Message from Nichol Jamison MD sent at 11/6/2024  9:18 AM CST -----  Abnormal results. (+) E.Coli. Please complete treatment as prescribed.

## 2024-11-18 ENCOUNTER — OFFICE VISIT (OUTPATIENT)
Dept: FAMILY MEDICINE | Facility: CLINIC | Age: 29
End: 2024-11-18
Payer: COMMERCIAL

## 2024-11-18 VITALS
HEART RATE: 70 BPM | SYSTOLIC BLOOD PRESSURE: 110 MMHG | RESPIRATION RATE: 17 BRPM | BODY MASS INDEX: 28.66 KG/M2 | OXYGEN SATURATION: 99 % | DIASTOLIC BLOOD PRESSURE: 60 MMHG | WEIGHT: 151.8 LBS | HEIGHT: 61 IN | TEMPERATURE: 97.3 F

## 2024-11-18 DIAGNOSIS — D62 ANEMIA DUE TO BLOOD LOSS, ACUTE: Primary | ICD-10-CM

## 2024-11-18 PROBLEM — R57.8 HEMORRHAGIC SHOCK (H): Status: RESOLVED | Noted: 2024-10-22 | Resolved: 2024-11-18

## 2024-11-18 PROBLEM — O03.31: Status: RESOLVED | Noted: 2024-10-22 | Resolved: 2024-11-18

## 2024-11-18 PROBLEM — O03.2 INCOMPLETE MISCARRIAGE WITH BLOOD CLOT: Status: RESOLVED | Noted: 2024-10-22 | Resolved: 2024-11-18

## 2024-11-18 PROBLEM — N93.9 VAGINAL BLEEDING: Status: RESOLVED | Noted: 2024-10-22 | Resolved: 2024-11-18

## 2024-11-18 LAB
ERYTHROCYTE [DISTWIDTH] IN BLOOD BY AUTOMATED COUNT: 13.5 % (ref 10–15)
HCT VFR BLD AUTO: 35.2 % (ref 35–47)
HGB BLD-MCNC: 11.7 G/DL (ref 11.7–15.7)
MCH RBC QN AUTO: 29.5 PG (ref 26.5–33)
MCHC RBC AUTO-ENTMCNC: 33.2 G/DL (ref 31.5–36.5)
MCV RBC AUTO: 89 FL (ref 78–100)
PLATELET # BLD AUTO: 279 10E3/UL (ref 150–450)
RBC # BLD AUTO: 3.97 10E6/UL (ref 3.8–5.2)
WBC # BLD AUTO: 7.6 10E3/UL (ref 4–11)

## 2024-11-18 PROCEDURE — 85027 COMPLETE CBC AUTOMATED: CPT | Performed by: FAMILY MEDICINE

## 2024-11-18 PROCEDURE — 36415 COLL VENOUS BLD VENIPUNCTURE: CPT | Performed by: FAMILY MEDICINE

## 2024-11-18 PROCEDURE — 99213 OFFICE O/P EST LOW 20 MIN: CPT | Performed by: FAMILY MEDICINE

## 2024-11-18 ASSESSMENT — PAIN SCALES - GENERAL: PAINLEVEL_OUTOF10: NO PAIN (0)

## 2024-11-18 NOTE — PROGRESS NOTES
"  Assessment & Plan     Anemia due to blood loss, acute  Patient's hemoglobin is back within normal limits.  Advised her that she can keep taking her prenatal vitamin and can drop her separate iron supplement at this point.  She should try to continue to eat iron rich foods.  Recommended preventing pregnancy until she resumes normal cycles, then she can try to achieve another pregnancy whenever she feels ready.  - CBC with platelets          BMI  Estimated body mass index is 28.68 kg/m  as calculated from the following:    Height as of this encounter: 1.549 m (5' 1\").    Weight as of this encounter: 68.9 kg (151 lb 12.8 oz).             Franklin Reaves is a 28 year old, presenting for the following health issues:  Follow Up      11/18/2024     2:23 PM   Additional Questions   Roomed by Marita Mijares   Accompanied by Self     History of Present Illness       Reason for visit:  Follow up    She eats 2-3 servings of fruits and vegetables daily.She consumes 0 sweetened beverage(s) daily.She exercises with enough effort to increase her heart rate 9 or less minutes per day.  She exercises with enough effort to increase her heart rate 3 or less days per week.   She is taking medications regularly.     Patient presents today in follow-up of her recent hospitalization.  She was seen in the emergency department on 10/21/2024 after having 2 episodes of syncope along with heavy vaginal bleeding.  She was found to have hemorrhagic shock while spontaneously miscarrying.  She ended up receiving at least 1 unit of packed red blood cells and had an emergent D&C procedure.  She thinks that she has been taking ibuprofen every 6 hours since her procedure, but overall is unsure of her current medications.  She thinks she is taking both an iron supplement daily and her prenatal vitamin as well, but also notes that she is only taking 2 medications currently.  She has not yet resumed her menses, but overall feels well.  She denies " "any lightheadedness.  She has not yet been sexually active after her procedure.  She does wish to achieve a pregnancy in the near future.  This was her first miscarriage.        Review of Systems  Constitutional, HEENT, cardiovascular, pulmonary, gi and gu systems are negative, except as otherwise noted.      Objective    /60   Pulse 70   Temp 97.3  F (36.3  C) (Temporal)   Resp 17   Ht 1.549 m (5' 1\")   Wt 68.9 kg (151 lb 12.8 oz)   LMP 07/20/2024 (Exact Date)   SpO2 99%   Breastfeeding Unknown   BMI 28.68 kg/m    Body mass index is 28.68 kg/m .  Physical Exam   GENERAL: alert and no distress  RESP: Breathing comfortably, no cough during the visit  MS: no gross musculoskeletal defects noted, no edema  NEURO: Normal strength and tone, mentation intact and speech normal  PSYCH: mentation appears normal, affect normal/bright    Results for orders placed or performed in visit on 11/18/24   CBC with platelets     Status: Normal   Result Value Ref Range    WBC Count 7.6 4.0 - 11.0 10e3/uL    RBC Count 3.97 3.80 - 5.20 10e6/uL    Hemoglobin 11.7 11.7 - 15.7 g/dL    Hematocrit 35.2 35.0 - 47.0 %    MCV 89 78 - 100 fL    MCH 29.5 26.5 - 33.0 pg    MCHC 33.2 31.5 - 36.5 g/dL    RDW 13.5 10.0 - 15.0 %    Platelet Count 279 150 - 450 10e3/uL           Signed Electronically by: Drea Kendall MD    "

## 2025-08-18 ENCOUNTER — PATIENT OUTREACH (OUTPATIENT)
Dept: CARE COORDINATION | Facility: CLINIC | Age: 30
End: 2025-08-18
Payer: COMMERCIAL

## 2025-09-01 ENCOUNTER — PATIENT OUTREACH (OUTPATIENT)
Dept: CARE COORDINATION | Facility: CLINIC | Age: 30
End: 2025-09-01
Payer: COMMERCIAL

## (undated) DEVICE — PREP DYNA-HEX 4% CHG SCRUB 4OZ BOTTLE MDS098710

## (undated) DEVICE — MAT FLOOR SURGICAL 40X38 0702140238

## (undated) DEVICE — GLOVE UNDER INDICATOR PI SZ 6.5 LF 41665

## (undated) DEVICE — DRSG TELFA 3X4" 1050

## (undated) DEVICE — DECANTER VIAL 2006S

## (undated) DEVICE — BRIEF STRETCH XL MPS40

## (undated) DEVICE — TUBING VACUUM COLLECTION 6FT 23116

## (undated) DEVICE — SOL WATER IRRIG 1000ML BOTTLE 2F7114

## (undated) DEVICE — CATH FOLEY 16FR 5ML LUBRICATH LATEX 0165L16

## (undated) DEVICE — CUSTOM PACK PERI GYN SMA5BPGHEA

## (undated) DEVICE — SUCTION CANNULA UTERINE 12MM CVD 022112-10

## (undated) DEVICE — GLOVE BIOGEL PI ULTRATOUCH G SZ 6.5 42165

## (undated) RX ORDER — LIDOCAINE HYDROCHLORIDE 10 MG/ML
INJECTION, SOLUTION EPIDURAL; INFILTRATION; INTRACAUDAL; PERINEURAL
Status: DISPENSED
Start: 2024-10-21

## (undated) RX ORDER — FENTANYL CITRATE 50 UG/ML
INJECTION, SOLUTION INTRAMUSCULAR; INTRAVENOUS
Status: DISPENSED
Start: 2024-10-21